# Patient Record
Sex: MALE | Race: WHITE | HISPANIC OR LATINO | Employment: OTHER | URBAN - METROPOLITAN AREA
[De-identification: names, ages, dates, MRNs, and addresses within clinical notes are randomized per-mention and may not be internally consistent; named-entity substitution may affect disease eponyms.]

---

## 2017-01-01 ENCOUNTER — RESOLUTE PROFESSIONAL BILLING HOSPITAL PROF FEE (OUTPATIENT)
Dept: HOSPITALIST | Facility: MEDICAL CENTER | Age: 67
End: 2017-01-01
Payer: COMMERCIAL

## 2017-01-01 ENCOUNTER — PATIENT OUTREACH (OUTPATIENT)
Dept: HEALTH INFORMATION MANAGEMENT | Facility: OTHER | Age: 67
End: 2017-01-01

## 2017-01-01 ENCOUNTER — HOSPITAL ENCOUNTER (INPATIENT)
Facility: MEDICAL CENTER | Age: 67
LOS: 2 days | DRG: 280 | End: 2017-10-30
Attending: EMERGENCY MEDICINE | Admitting: HOSPITALIST
Payer: COMMERCIAL

## 2017-01-01 ENCOUNTER — APPOINTMENT (OUTPATIENT)
Dept: RADIOLOGY | Facility: MEDICAL CENTER | Age: 67
DRG: 280 | End: 2017-01-01
Attending: EMERGENCY MEDICINE
Payer: COMMERCIAL

## 2017-01-01 ENCOUNTER — APPOINTMENT (OUTPATIENT)
Dept: RADIOLOGY | Facility: MEDICAL CENTER | Age: 67
DRG: 280 | End: 2017-01-01
Attending: HOSPITALIST
Payer: COMMERCIAL

## 2017-01-01 VITALS
DIASTOLIC BLOOD PRESSURE: 43 MMHG | WEIGHT: 241.4 LBS | OXYGEN SATURATION: 95 % | HEIGHT: 69 IN | SYSTOLIC BLOOD PRESSURE: 104 MMHG | RESPIRATION RATE: 15 BRPM | TEMPERATURE: 97.7 F | HEART RATE: 75 BPM | BODY MASS INDEX: 35.76 KG/M2

## 2017-01-01 DIAGNOSIS — R79.89 ELEVATED TROPONIN: ICD-10-CM

## 2017-01-01 DIAGNOSIS — N28.9 ACUTE RENAL DISEASE: ICD-10-CM

## 2017-01-01 DIAGNOSIS — D64.9 ANEMIA, UNSPECIFIED TYPE: ICD-10-CM

## 2017-01-01 DIAGNOSIS — I50.9 ACUTE CONGESTIVE HEART FAILURE, UNSPECIFIED CONGESTIVE HEART FAILURE TYPE: ICD-10-CM

## 2017-01-01 DIAGNOSIS — R55 SYNCOPE, UNSPECIFIED SYNCOPE TYPE: ICD-10-CM

## 2017-01-01 LAB
ALBUMIN SERPL BCP-MCNC: 2.3 G/DL (ref 3.2–4.9)
ALBUMIN SERPL BCP-MCNC: 2.4 G/DL (ref 3.2–4.9)
ALBUMIN SERPL BCP-MCNC: 2.5 G/DL (ref 3.2–4.9)
ALBUMIN/GLOB SERPL: 1 G/DL
ALP SERPL-CCNC: 73 U/L (ref 30–99)
ALT SERPL-CCNC: 9 U/L (ref 2–50)
ANION GAP SERPL CALC-SCNC: 7 MMOL/L (ref 0–11.9)
ANISOCYTOSIS BLD QL SMEAR: ABNORMAL
APTT PPP: 33.5 SEC (ref 24.7–36)
AST SERPL-CCNC: 16 U/L (ref 12–45)
BASOPHILS # BLD AUTO: 0 % (ref 0–1.8)
BASOPHILS # BLD: 0 K/UL (ref 0–0.12)
BILIRUB SERPL-MCNC: 0.4 MG/DL (ref 0.1–1.5)
BNP SERPL-MCNC: 518 PG/ML (ref 0–100)
BNP SERPL-MCNC: 653 PG/ML (ref 0–100)
BUN SERPL-MCNC: 39 MG/DL (ref 8–22)
BUN SERPL-MCNC: 42 MG/DL (ref 8–22)
BUN SERPL-MCNC: 44 MG/DL (ref 8–22)
BURR CELLS BLD QL SMEAR: NORMAL
CALCIUM SERPL-MCNC: 7.3 MG/DL (ref 8.5–10.5)
CALCIUM SERPL-MCNC: 7.8 MG/DL (ref 8.5–10.5)
CALCIUM SERPL-MCNC: 7.8 MG/DL (ref 8.5–10.5)
CHLORIDE SERPL-SCNC: 107 MMOL/L (ref 96–112)
CHLORIDE SERPL-SCNC: 108 MMOL/L (ref 96–112)
CHLORIDE SERPL-SCNC: 109 MMOL/L (ref 96–112)
CO2 SERPL-SCNC: 24 MMOL/L (ref 20–33)
CO2 SERPL-SCNC: 25 MMOL/L (ref 20–33)
CO2 SERPL-SCNC: 25 MMOL/L (ref 20–33)
CREAT SERPL-MCNC: 2.26 MG/DL (ref 0.5–1.4)
CREAT SERPL-MCNC: 2.28 MG/DL (ref 0.5–1.4)
CREAT SERPL-MCNC: 2.46 MG/DL (ref 0.5–1.4)
DEPRECATED D DIMER PPP IA-ACNC: <200 NG/ML(D-DU)
EKG IMPRESSION: NORMAL
EOSINOPHIL # BLD AUTO: 0.22 K/UL (ref 0–0.51)
EOSINOPHIL NFR BLD: 3.5 % (ref 0–6.9)
ERYTHROCYTE [DISTWIDTH] IN BLOOD BY AUTOMATED COUNT: 49.7 FL (ref 35.9–50)
ERYTHROCYTE [DISTWIDTH] IN BLOOD BY AUTOMATED COUNT: 49.8 FL (ref 35.9–50)
ERYTHROCYTE [DISTWIDTH] IN BLOOD BY AUTOMATED COUNT: 49.8 FL (ref 35.9–50)
FERRITIN SERPL-MCNC: 25.6 NG/ML (ref 22–322)
GFR SERPL CREATININE-BSD FRML MDRD: 26 ML/MIN/1.73 M 2
GFR SERPL CREATININE-BSD FRML MDRD: 29 ML/MIN/1.73 M 2
GFR SERPL CREATININE-BSD FRML MDRD: 29 ML/MIN/1.73 M 2
GLOBULIN SER CALC-MCNC: 2.4 G/DL (ref 1.9–3.5)
GLUCOSE BLD-MCNC: 126 MG/DL (ref 65–99)
GLUCOSE BLD-MCNC: 192 MG/DL (ref 65–99)
GLUCOSE BLD-MCNC: 224 MG/DL (ref 65–99)
GLUCOSE BLD-MCNC: 252 MG/DL (ref 65–99)
GLUCOSE BLD-MCNC: 258 MG/DL (ref 65–99)
GLUCOSE BLD-MCNC: 75 MG/DL (ref 65–99)
GLUCOSE BLD-MCNC: 79 MG/DL (ref 65–99)
GLUCOSE BLD-MCNC: 83 MG/DL (ref 65–99)
GLUCOSE SERPL-MCNC: 101 MG/DL (ref 65–99)
GLUCOSE SERPL-MCNC: 132 MG/DL (ref 65–99)
GLUCOSE SERPL-MCNC: 192 MG/DL (ref 65–99)
HCT VFR BLD AUTO: 30.2 % (ref 42–52)
HCT VFR BLD AUTO: 30.2 % (ref 42–52)
HCT VFR BLD AUTO: 30.3 % (ref 42–52)
HGB BLD-MCNC: 8.4 G/DL (ref 14–18)
HGB BLD-MCNC: 8.6 G/DL (ref 14–18)
HGB BLD-MCNC: 8.6 G/DL (ref 14–18)
INR PPP: 2.81 (ref 0.87–1.13)
INR PPP: 2.98 (ref 0.87–1.13)
INR PPP: 3.08 (ref 0.87–1.13)
IRON SATN MFR SERPL: 5 % (ref 15–55)
IRON SERPL-MCNC: 15 UG/DL (ref 50–180)
LV EJECT FRACT  99904: 20
LYMPHOCYTES # BLD AUTO: 0.71 K/UL (ref 1–4.8)
LYMPHOCYTES NFR BLD: 11.4 % (ref 22–41)
MANUAL DIFF BLD: NORMAL
MCH RBC QN AUTO: 19.8 PG (ref 27–33)
MCH RBC QN AUTO: 20.3 PG (ref 27–33)
MCH RBC QN AUTO: 20.4 PG (ref 27–33)
MCHC RBC AUTO-ENTMCNC: 27.8 G/DL (ref 33.7–35.3)
MCHC RBC AUTO-ENTMCNC: 28.4 G/DL (ref 33.7–35.3)
MCHC RBC AUTO-ENTMCNC: 28.5 G/DL (ref 33.7–35.3)
MCV RBC AUTO: 71.1 FL (ref 81.4–97.8)
MCV RBC AUTO: 71.2 FL (ref 81.4–97.8)
MCV RBC AUTO: 71.8 FL (ref 81.4–97.8)
MICROCYTES BLD QL SMEAR: ABNORMAL
MONOCYTES # BLD AUTO: 0.43 K/UL (ref 0–0.85)
MONOCYTES NFR BLD AUTO: 7 % (ref 0–13.4)
MORPHOLOGY BLD-IMP: NORMAL
NEUTROPHILS # BLD AUTO: 4.68 K/UL (ref 1.82–7.42)
NEUTROPHILS NFR BLD: 75.5 % (ref 44–72)
NRBC # BLD AUTO: 0 K/UL
NRBC BLD AUTO-RTO: 0 /100 WBC
PHOSPHATE SERPL-MCNC: 3.3 MG/DL (ref 2.5–4.5)
PHOSPHATE SERPL-MCNC: 3.6 MG/DL (ref 2.5–4.5)
PLATELET # BLD AUTO: 192 K/UL (ref 164–446)
PLATELET # BLD AUTO: 196 K/UL (ref 164–446)
PLATELET # BLD AUTO: 218 K/UL (ref 164–446)
PLATELET BLD QL SMEAR: NORMAL
PMV BLD AUTO: 10.1 FL (ref 9–12.9)
PMV BLD AUTO: 10.2 FL (ref 9–12.9)
PMV BLD AUTO: 10.2 FL (ref 9–12.9)
POIKILOCYTOSIS BLD QL SMEAR: NORMAL
POTASSIUM SERPL-SCNC: 4 MMOL/L (ref 3.6–5.5)
POTASSIUM SERPL-SCNC: 4.3 MMOL/L (ref 3.6–5.5)
POTASSIUM SERPL-SCNC: 4.8 MMOL/L (ref 3.6–5.5)
PROMYELOCYTES NFR BLD MANUAL: 2.6 %
PROT SERPL-MCNC: 4.9 G/DL (ref 6–8.2)
PROTHROMBIN TIME: 29.3 SEC (ref 12–14.6)
PROTHROMBIN TIME: 30.7 SEC (ref 12–14.6)
PROTHROMBIN TIME: 31.5 SEC (ref 12–14.6)
RBC # BLD AUTO: 4.22 M/UL (ref 4.7–6.1)
RBC # BLD AUTO: 4.24 M/UL (ref 4.7–6.1)
RBC # BLD AUTO: 4.25 M/UL (ref 4.7–6.1)
RBC BLD AUTO: PRESENT
SODIUM SERPL-SCNC: 138 MMOL/L (ref 135–145)
SODIUM SERPL-SCNC: 139 MMOL/L (ref 135–145)
SODIUM SERPL-SCNC: 141 MMOL/L (ref 135–145)
SPHEROCYTES BLD QL SMEAR: NORMAL
TIBC SERPL-MCNC: 305 UG/DL (ref 250–450)
TROPONIN I SERPL-MCNC: 0.05 NG/ML (ref 0–0.04)
TROPONIN I SERPL-MCNC: 0.07 NG/ML (ref 0–0.04)
TROPONIN I SERPL-MCNC: 0.07 NG/ML (ref 0–0.04)
WBC # BLD AUTO: 5.9 K/UL (ref 4.8–10.8)
WBC # BLD AUTO: 6.2 K/UL (ref 4.8–10.8)
WBC # BLD AUTO: 6.6 K/UL (ref 4.8–10.8)

## 2017-01-01 PROCEDURE — 94660 CPAP INITIATION&MGMT: CPT

## 2017-01-01 PROCEDURE — 83540 ASSAY OF IRON: CPT

## 2017-01-01 PROCEDURE — 85610 PROTHROMBIN TIME: CPT

## 2017-01-01 PROCEDURE — 80069 RENAL FUNCTION PANEL: CPT

## 2017-01-01 PROCEDURE — 700102 HCHG RX REV CODE 250 W/ 637 OVERRIDE(OP): Performed by: HOSPITALIST

## 2017-01-01 PROCEDURE — 700111 HCHG RX REV CODE 636 W/ 250 OVERRIDE (IP): Performed by: HOSPITALIST

## 2017-01-01 PROCEDURE — 84484 ASSAY OF TROPONIN QUANT: CPT

## 2017-01-01 PROCEDURE — 36415 COLL VENOUS BLD VENIPUNCTURE: CPT

## 2017-01-01 PROCEDURE — A9270 NON-COVERED ITEM OR SERVICE: HCPCS | Performed by: INTERNAL MEDICINE

## 2017-01-01 PROCEDURE — 304561 HCHG STAT O2

## 2017-01-01 PROCEDURE — 93005 ELECTROCARDIOGRAM TRACING: CPT | Performed by: EMERGENCY MEDICINE

## 2017-01-01 PROCEDURE — 304562 HCHG STAT O2 MASK/CANNULA

## 2017-01-01 PROCEDURE — 700102 HCHG RX REV CODE 250 W/ 637 OVERRIDE(OP): Performed by: INTERNAL MEDICINE

## 2017-01-01 PROCEDURE — 99239 HOSP IP/OBS DSCHRG MGMT >30: CPT | Performed by: INTERNAL MEDICINE

## 2017-01-01 PROCEDURE — A9270 NON-COVERED ITEM OR SERVICE: HCPCS | Performed by: HOSPITALIST

## 2017-01-01 PROCEDURE — 82962 GLUCOSE BLOOD TEST: CPT | Mod: 91

## 2017-01-01 PROCEDURE — 700102 HCHG RX REV CODE 250 W/ 637 OVERRIDE(OP)

## 2017-01-01 PROCEDURE — 85027 COMPLETE CBC AUTOMATED: CPT

## 2017-01-01 PROCEDURE — 83550 IRON BINDING TEST: CPT

## 2017-01-01 PROCEDURE — 83880 ASSAY OF NATRIURETIC PEPTIDE: CPT

## 2017-01-01 PROCEDURE — 99221 1ST HOSP IP/OBS SF/LOW 40: CPT | Performed by: HOSPITALIST

## 2017-01-01 PROCEDURE — 80053 COMPREHEN METABOLIC PANEL: CPT

## 2017-01-01 PROCEDURE — 99233 SBSQ HOSP IP/OBS HIGH 50: CPT | Performed by: INTERNAL MEDICINE

## 2017-01-01 PROCEDURE — 85007 BL SMEAR W/DIFF WBC COUNT: CPT

## 2017-01-01 PROCEDURE — A9270 NON-COVERED ITEM OR SERVICE: HCPCS

## 2017-01-01 PROCEDURE — 71010 DX-CHEST-PORTABLE (1 VIEW): CPT

## 2017-01-01 PROCEDURE — 93306 TTE W/DOPPLER COMPLETE: CPT | Mod: 26 | Performed by: INTERNAL MEDICINE

## 2017-01-01 PROCEDURE — 93306 TTE W/DOPPLER COMPLETE: CPT

## 2017-01-01 PROCEDURE — 770020 HCHG ROOM/CARE - TELE (206)

## 2017-01-01 PROCEDURE — 85730 THROMBOPLASTIN TIME PARTIAL: CPT

## 2017-01-01 PROCEDURE — 99285 EMERGENCY DEPT VISIT HI MDM: CPT

## 2017-01-01 PROCEDURE — 82728 ASSAY OF FERRITIN: CPT

## 2017-01-01 PROCEDURE — 82962 GLUCOSE BLOOD TEST: CPT

## 2017-01-01 PROCEDURE — 85379 FIBRIN DEGRADATION QUANT: CPT

## 2017-01-01 RX ORDER — FERROUS GLUCONATE 324(38)MG
324 TABLET ORAL
Status: DISCONTINUED | OUTPATIENT
Start: 2017-01-01 | End: 2017-01-01 | Stop reason: HOSPADM

## 2017-01-01 RX ORDER — WARFARIN SODIUM 1 MG/1
1 TABLET ORAL
Status: COMPLETED | OUTPATIENT
Start: 2017-01-01 | End: 2017-01-01

## 2017-01-01 RX ORDER — CARVEDILOL 25 MG/1
25 TABLET ORAL 2 TIMES DAILY WITH MEALS
Status: DISCONTINUED | OUTPATIENT
Start: 2017-01-01 | End: 2017-01-01 | Stop reason: HOSPADM

## 2017-01-01 RX ORDER — WARFARIN SODIUM 2 MG/1
2-3 TABLET ORAL EVERY EVENING
COMMUNITY

## 2017-01-01 RX ORDER — FUROSEMIDE 20 MG/1
40 TABLET ORAL 2 TIMES DAILY
Qty: 60 TAB | Refills: 0 | Status: SHIPPED | OUTPATIENT
Start: 2017-01-01 | End: 2018-01-01

## 2017-01-01 RX ORDER — WARFARIN SODIUM 2 MG/1
2-3 TABLET ORAL EVERY EVENING
Status: DISCONTINUED | OUTPATIENT
Start: 2017-01-01 | End: 2017-01-01

## 2017-01-01 RX ORDER — ASPIRIN 81 MG/1
81 TABLET, CHEWABLE ORAL EVERY MORNING
Status: DISCONTINUED | OUTPATIENT
Start: 2017-01-01 | End: 2017-01-01 | Stop reason: HOSPADM

## 2017-01-01 RX ORDER — FUROSEMIDE 10 MG/ML
20 INJECTION INTRAMUSCULAR; INTRAVENOUS
Status: DISCONTINUED | OUTPATIENT
Start: 2017-01-01 | End: 2017-01-01

## 2017-01-01 RX ORDER — TIZANIDINE 4 MG/1
2 TABLET ORAL EVERY EVENING
Status: DISCONTINUED | OUTPATIENT
Start: 2017-01-01 | End: 2017-01-01 | Stop reason: HOSPADM

## 2017-01-01 RX ORDER — ISOSORBIDE MONONITRATE 30 MG/1
30 TABLET, EXTENDED RELEASE ORAL EVERY MORNING
Status: DISCONTINUED | OUTPATIENT
Start: 2017-01-01 | End: 2017-01-01 | Stop reason: HOSPADM

## 2017-01-01 RX ORDER — ISOSORBIDE MONONITRATE 30 MG/1
30 TABLET, EXTENDED RELEASE ORAL EVERY MORNING
COMMUNITY

## 2017-01-01 RX ORDER — ATORVASTATIN CALCIUM 40 MG/1
40 TABLET, FILM COATED ORAL NIGHTLY
Status: DISCONTINUED | OUTPATIENT
Start: 2017-01-01 | End: 2017-01-01 | Stop reason: HOSPADM

## 2017-01-01 RX ORDER — FERROUS GLUCONATE 324(38)MG
324 TABLET ORAL DAILY
Qty: 30 TAB | Status: ON HOLD | COMMUNITY
Start: 2017-01-01 | End: 2018-01-01

## 2017-01-01 RX ORDER — WARFARIN SODIUM 2 MG/1
2 TABLET ORAL
Status: COMPLETED | OUTPATIENT
Start: 2017-01-01 | End: 2017-01-01

## 2017-01-01 RX ORDER — FUROSEMIDE 40 MG/1
40-80 TABLET ORAL 2 TIMES DAILY
Status: ON HOLD | COMMUNITY
End: 2017-01-01

## 2017-01-01 RX ORDER — ATORVASTATIN CALCIUM 40 MG/1
40 TABLET, FILM COATED ORAL NIGHTLY
COMMUNITY

## 2017-01-01 RX ORDER — CARVEDILOL 25 MG/1
25 TABLET ORAL 2 TIMES DAILY WITH MEALS
COMMUNITY

## 2017-01-01 RX ORDER — POTASSIUM CHLORIDE 20 MEQ/1
20 TABLET, EXTENDED RELEASE ORAL DAILY
Qty: 30 TAB | Refills: 0 | Status: ON HOLD | OUTPATIENT
Start: 2017-01-01 | End: 2018-01-01

## 2017-01-01 RX ORDER — FUROSEMIDE 20 MG/1
20 TABLET ORAL
Status: DISCONTINUED | OUTPATIENT
Start: 2017-01-01 | End: 2017-01-01 | Stop reason: HOSPADM

## 2017-01-01 RX ORDER — HYDRALAZINE HYDROCHLORIDE 25 MG/1
12.5 TABLET, FILM COATED ORAL 2 TIMES DAILY
COMMUNITY

## 2017-01-01 RX ORDER — ACETAMINOPHEN 500 MG
1000 TABLET ORAL EVERY 6 HOURS PRN
COMMUNITY
End: 2018-01-01

## 2017-01-01 RX ORDER — POTASSIUM CHLORIDE 20 MEQ/1
20 TABLET, EXTENDED RELEASE ORAL 2 TIMES DAILY
Status: DISCONTINUED | OUTPATIENT
Start: 2017-01-01 | End: 2017-01-01 | Stop reason: HOSPADM

## 2017-01-01 RX ADMIN — POTASSIUM CHLORIDE 20 MEQ: 1500 TABLET, EXTENDED RELEASE ORAL at 08:54

## 2017-01-01 RX ADMIN — INSULIN LISPRO 3 UNITS: 100 INJECTION, SOLUTION INTRAVENOUS; SUBCUTANEOUS at 12:18

## 2017-01-01 RX ADMIN — INSULIN HUMAN 40 UNITS: 100 INJECTION, SUSPENSION SUBCUTANEOUS at 08:56

## 2017-01-01 RX ADMIN — INSULIN LISPRO 2 UNITS: 100 INJECTION, SOLUTION INTRAVENOUS; SUBCUTANEOUS at 12:27

## 2017-01-01 RX ADMIN — FUROSEMIDE 20 MG: 20 TABLET ORAL at 06:11

## 2017-01-01 RX ADMIN — ATORVASTATIN CALCIUM 40 MG: 40 TABLET, FILM COATED ORAL at 23:02

## 2017-01-01 RX ADMIN — FERROUS GLUCONATE 324 MG: 324 TABLET ORAL at 12:21

## 2017-01-01 RX ADMIN — FERROUS GLUCONATE 324 MG: 324 TABLET ORAL at 17:16

## 2017-01-01 RX ADMIN — POTASSIUM CHLORIDE 20 MEQ: 1500 TABLET, EXTENDED RELEASE ORAL at 23:02

## 2017-01-01 RX ADMIN — FERROUS GLUCONATE 324 MG: 324 TABLET ORAL at 08:53

## 2017-01-01 RX ADMIN — ASPIRIN 81 MG: 81 TABLET, CHEWABLE ORAL at 09:20

## 2017-01-01 RX ADMIN — CARVEDILOL 25 MG: 25 TABLET, FILM COATED ORAL at 09:20

## 2017-01-01 RX ADMIN — ASPIRIN 81 MG: 81 TABLET, CHEWABLE ORAL at 08:53

## 2017-01-01 RX ADMIN — FUROSEMIDE 20 MG: 10 INJECTION, SOLUTION INTRAMUSCULAR; INTRAVENOUS at 06:41

## 2017-01-01 RX ADMIN — FUROSEMIDE 20 MG: 20 TABLET ORAL at 17:16

## 2017-01-01 RX ADMIN — WARFARIN SODIUM 2 MG: 2 TABLET ORAL at 23:30

## 2017-01-01 RX ADMIN — CARVEDILOL 25 MG: 25 TABLET, FILM COATED ORAL at 23:02

## 2017-01-01 RX ADMIN — FERROUS GLUCONATE 324 MG: 324 TABLET ORAL at 23:02

## 2017-01-01 RX ADMIN — ISOSORBIDE MONONITRATE 30 MG: 30 TABLET, EXTENDED RELEASE ORAL at 08:53

## 2017-01-01 RX ADMIN — FERROUS GLUCONATE 324 MG: 324 TABLET ORAL at 09:20

## 2017-01-01 RX ADMIN — INSULIN HUMAN 40 UNITS: 100 INJECTION, SUSPENSION SUBCUTANEOUS at 12:19

## 2017-01-01 RX ADMIN — FUROSEMIDE 20 MG: 10 INJECTION, SOLUTION INTRAMUSCULAR; INTRAVENOUS at 23:02

## 2017-01-01 RX ADMIN — INSULIN LISPRO 5 UNITS: 100 INJECTION, SOLUTION INTRAVENOUS; SUBCUTANEOUS at 21:07

## 2017-01-01 RX ADMIN — INSULIN HUMAN 34 UNITS: 100 INJECTION, SUSPENSION SUBCUTANEOUS at 21:06

## 2017-01-01 RX ADMIN — POTASSIUM CHLORIDE 20 MEQ: 1500 TABLET, EXTENDED RELEASE ORAL at 20:58

## 2017-01-01 RX ADMIN — TIZANIDINE 2 MG: 4 TABLET ORAL at 23:30

## 2017-01-01 RX ADMIN — CARVEDILOL 25 MG: 25 TABLET, FILM COATED ORAL at 08:53

## 2017-01-01 RX ADMIN — INSULIN LISPRO 5 UNITS: 100 INJECTION, SOLUTION INTRAVENOUS; SUBCUTANEOUS at 17:13

## 2017-01-01 RX ADMIN — POTASSIUM CHLORIDE 20 MEQ: 1500 TABLET, EXTENDED RELEASE ORAL at 09:20

## 2017-01-01 RX ADMIN — ATORVASTATIN CALCIUM 40 MG: 40 TABLET, FILM COATED ORAL at 20:58

## 2017-01-01 RX ADMIN — ISOSORBIDE MONONITRATE 30 MG: 30 TABLET, EXTENDED RELEASE ORAL at 17:16

## 2017-01-01 RX ADMIN — CARVEDILOL 25 MG: 25 TABLET, FILM COATED ORAL at 17:16

## 2017-01-01 RX ADMIN — WARFARIN SODIUM 1 MG: 1 TABLET ORAL at 18:15

## 2017-01-01 RX ADMIN — TIZANIDINE 2 MG: 4 TABLET ORAL at 22:21

## 2017-01-01 RX ADMIN — FERROUS GLUCONATE 324 MG: 324 TABLET ORAL at 12:29

## 2017-01-01 ASSESSMENT — PAIN SCALES - GENERAL
PAINLEVEL_OUTOF10: 0

## 2017-01-01 ASSESSMENT — COGNITIVE AND FUNCTIONAL STATUS - GENERAL
SUGGESTED CMS G CODE MODIFIER DAILY ACTIVITY: CH
MOBILITY SCORE: 24
DAILY ACTIVITIY SCORE: 24
SUGGESTED CMS G CODE MODIFIER MOBILITY: CH

## 2017-01-01 ASSESSMENT — ENCOUNTER SYMPTOMS
HEADACHES: 0
BLOOD IN STOOL: 0
DIZZINESS: 0
EYE REDNESS: 0
FOCAL WEAKNESS: 0
INSOMNIA: 0
DIARRHEA: 0
EYE PAIN: 0
NAUSEA: 0
MYALGIAS: 0
CHILLS: 0
TREMORS: 0
SHORTNESS OF BREATH: 1
FEVER: 0
ABDOMINAL PAIN: 0
SEIZURES: 0
WEAKNESS: 0
HEMOPTYSIS: 0
ORTHOPNEA: 1
VOMITING: 0
CONSTIPATION: 0
NERVOUS/ANXIOUS: 0
COUGH: 0
LOSS OF CONSCIOUSNESS: 0
WHEEZING: 0
FALLS: 0
PALPITATIONS: 0

## 2017-01-01 ASSESSMENT — PATIENT HEALTH QUESTIONNAIRE - PHQ9
SUM OF ALL RESPONSES TO PHQ9 QUESTIONS 1 AND 2: 0
2. FEELING DOWN, DEPRESSED, IRRITABLE, OR HOPELESS: NOT AT ALL
SUM OF ALL RESPONSES TO PHQ QUESTIONS 1-9: 0
1. LITTLE INTEREST OR PLEASURE IN DOING THINGS: NOT AT ALL

## 2017-01-01 ASSESSMENT — LIFESTYLE VARIABLES
DO YOU DRINK ALCOHOL: NO
DO YOU DRINK ALCOHOL: NO
EVER_SMOKED: YES

## 2017-10-28 PROBLEM — I50.43 ACUTE ON CHRONIC COMBINED SYSTOLIC AND DIASTOLIC CHF (CONGESTIVE HEART FAILURE) (HCC): Status: ACTIVE | Noted: 2017-01-01

## 2017-10-28 PROBLEM — R55 SYNCOPE: Status: ACTIVE | Noted: 2017-01-01

## 2017-10-28 PROBLEM — N18.30 CKD (CHRONIC KIDNEY DISEASE) STAGE 3, GFR 30-59 ML/MIN: Status: ACTIVE | Noted: 2017-01-01

## 2017-10-28 NOTE — ED PROVIDER NOTES
ED Provider Note    Scribed for Natasha Underwood M.D. by Annemarie Felder. 10/28/2017, 4:55 PM.    Means of arrival: Ambulance  History obtained from: Patient, Son  History limited by: None    CHIEF COMPLAINT  Chief Complaint   Patient presents with   • Other       HPI  Rell Pérez is a 67 y.o. male who presents to the Emergency Department brought in by ambulance after becoming unresponsive. The patient was driving with son and stated he was not feeling well, felt very hot. He started gagging like he was going to vomit. Son states he pulled up to stop and noticed his eyes rolled back and thick liquid was coming from his mouth, and he noticed he was not responding and stopped breathing. Son states he tried to clear airway and started CPR and give rescue breaths, and when fire arrived, they did additional CPR. After about 1 minute, patient came to. Patient has history of IDDM and FSBS 187. States he did not bite his tongue and denies incontinence. Pt states he took Robitussin for the first time and this also made him feel weird. He also has history of CHF, hypertension, CABG in 1997, a fib with defib-pacer. No history of PE or DVT. The patient is visiting from [place] and just drove up today. He was 88% on room air and not normally on home oxygen. He noticed that his legs are slightly more swollen than normal. Currently, the patient has a cough but denies any chest pain, hemoptysis. He does use nebulizer and CPAP for sleep apnea    REVIEW OF SYSTEMS  Pertinent positives include unresponsive episode, leg swelling. Pertinent negatives include no chest pain. All other systems reviewed and negative.     PAST MEDICAL HISTORY   CHF  Hypertension, A fib    SURGICAL HISTORY  CABG    SOCIAL HISTORY  Social History   Substance Use Topics   • Smoking status: Former Smoker     Types: Cigarettes     Quit date: 10/28/1997   • Smokeless tobacco: Never Used   • Alcohol use No      History   Drug use: Unknown       FAMILY  "HISTORY  History reviewed. No pertinent family history.    CURRENT MEDICATIONS  Home Medications     Reviewed by Brit Tobin R.N. (Registered Nurse) on 10/28/17 at 1654  Med List Status: Partial   Medication Last Dose Status   Atorvastatin Calcium (LIPITOR PO)  Active   CARVEDILOL PO  Active   FUROSEMIDE PO  Active   Isosorbide Mononitrate (IMDUR PO)  Active   NON SPECIFIED  Active                ALLERGIES  No Known Allergies    PHYSICAL EXAM  VITAL SIGNS: Pulse 74   Temp 36.8 °C (98.2 °F)   Resp 16   Ht 1.753 m (5' 9\")   Wt 99.8 kg (220 lb)   SpO2 100%   BMI 32.49 kg/m²     Constitutional:Well developed, No acute distress, Non-toxic appearance. Joking in the room that he wants daysi ROBERTS: Normocephalic, Atraumatic, Bilateral external ears normal, Oropharynx moist, No oral exudates, Nose normal. No tongue abrasions or lacerations  Eyes: PERRL, EOMI, Conjunctiva normal, No discharge.   Neck: Normal range of motion, No tenderness, Supple, No stridor.   Lymphatic: No lymphadenopathy noted.   Cardiovascular: Normal heart rate, Normal rhythm. Sternal scar  Thorax & Lungs: Normal breath sounds, No respiratory distress, No wheezing, No chest tenderness.   Abdomen: Benign abdominal exam, no guarding no rebound, no masses, no pulsatile mass, no tenderness, no distention  Skin: Warm, Dry, No erythema, No rash.   Back: No tenderness, No CVA tenderness.   Extremities: Intact distal pulses, 2+ pedal edema right greater than left, No tenderness   Neurologic: Alert & oriented x 3, Normal motor function, Normal sensory function, No focal deficits noted.   Psychiatric: Appropriate                                                     DIAGNOSTIC STUDIES / PROCEDURES\    LABS  Results for orders placed or performed during the hospital encounter of 10/28/17   CBC WITH DIFFERENTIAL   Result Value Ref Range    WBC 6.2 4.8 - 10.8 K/uL    RBC 4.22 (L) 4.70 - 6.10 M/uL    Hemoglobin 8.6 (L) 14.0 - 18.0 g/dL    Hematocrit 30.3 (L) " 42.0 - 52.0 %    MCV 71.8 (L) 81.4 - 97.8 fL    MCH 20.4 (L) 27.0 - 33.0 pg    MCHC 28.4 (L) 33.7 - 35.3 g/dL    RDW 49.8 35.9 - 50.0 fL    Platelet Count 196 164 - 446 K/uL    MPV 10.1 9.0 - 12.9 fL    Neutrophils-Polys 75.50 (H) 44.00 - 72.00 %    Lymphocytes 11.40 (L) 22.00 - 41.00 %    Monocytes 7.00 0.00 - 13.40 %    Eosinophils 3.50 0.00 - 6.90 %    Basophils 0.00 0.00 - 1.80 %    Nucleated RBC 0.00 /100 WBC    Neutrophils (Absolute) 4.68 1.82 - 7.42 K/uL    Lymphs (Absolute) 0.71 (L) 1.00 - 4.80 K/uL    Monos (Absolute) 0.43 0.00 - 0.85 K/uL    Eos (Absolute) 0.22 0.00 - 0.51 K/uL    Baso (Absolute) 0.00 0.00 - 0.12 K/uL    NRBC (Absolute) 0.00 K/uL    Anisocytosis 2+     Microcytosis 2+    COMP METABOLIC PANEL   Result Value Ref Range    Sodium 138 135 - 145 mmol/L    Potassium 4.0 3.6 - 5.5 mmol/L    Chloride 107 96 - 112 mmol/L    Co2 24 20 - 33 mmol/L    Anion Gap 7.0 0.0 - 11.9    Glucose 132 (H) 65 - 99 mg/dL    Bun 44 (H) 8 - 22 mg/dL    Creatinine 2.46 (H) 0.50 - 1.40 mg/dL    Calcium 7.3 (L) 8.5 - 10.5 mg/dL    AST(SGOT) 16 12 - 45 U/L    ALT(SGPT) 9 2 - 50 U/L    Alkaline Phosphatase 73 30 - 99 U/L    Total Bilirubin 0.4 0.1 - 1.5 mg/dL    Albumin 2.5 (L) 3.2 - 4.9 g/dL    Total Protein 4.9 (L) 6.0 - 8.2 g/dL    Globulin 2.4 1.9 - 3.5 g/dL    A-G Ratio 1.0 g/dL   TROPONIN   Result Value Ref Range    Troponin I 0.05 (H) 0.00 - 0.04 ng/mL   PRTOTHROMBIN TIME (INR)   Result Value Ref Range    PT 30.7 (H) 12.0 - 14.6 sec    INR 2.98 (H) 0.87 - 1.13   APTT   Result Value Ref Range    APTT 33.5 24.7 - 36.0 sec   BTYPE NATRIURETIC PEPTIDE   Result Value Ref Range    B Natriuretic Peptide 518 (H) 0 - 100 pg/mL   D-DIMER   Result Value Ref Range    D-Dimer Screen <200 <250 ng/mL(D-DU)   ESTIMATED GFR   Result Value Ref Range    GFR If  32 (A) >60 mL/min/1.73 m 2    GFR If Non African American 26 (A) >60 mL/min/1.73 m 2   DIFFERENTIAL MANUAL   Result Value Ref Range    Progranulocytes 2.60 %     Manual Diff Status PERFORMED    PERIPHERAL SMEAR REVIEW   Result Value Ref Range    Peripheral Smear Review see below    PLATELET ESTIMATE   Result Value Ref Range    Plt Estimation Normal    MORPHOLOGY   Result Value Ref Range    RBC Morphology Present     Poikilocytosis 1+     Echinocytes 1+     Spherocytes 1+    EKG (ER)   Result Value Ref Range    Report       [facility] Emergency Dept.    Test Date:  2017-10-28  Pt Name:    PAM DE LEON                Department: ER  MRN:        [id number]                      Room:       [facility]  Gender:     M                            Technician: [id number]  :        [id number]                   Requested By:VALORIE HAHN  Order #:    [id number]                    Reading MD:    Measurements  Intervals                                Axis  Rate:       74                           P:          -13  TX:         196                          QRS:        48  QRSD:       112                          T:          220  QT:         452  QTc:        502    Interpretive Statements  SINUS RHYTHM  PROBABLE LEFT ATRIAL ABNORMALITY  NONSPECIFIC INTRAVENTRICULAR CONDUCTION DELAY  ANTERIOR INFARCT, AGE INDETERMINATE  No previous ECG available for comparison         All labs reviewed by me.    EKG  12 lead EKG interpreted by me.   Rhythm:  Normal sinus rhythm   Rate: 74  Axis: Normal  Ectopy: None  Conduction: Normal  ST Segments: Minimal ST elevation in lead II  T Waves: Flipped in V5-V6  Q Waves: None  Clinical Impression: No ST elevation myocardial infarction.   Comparison: No old available for comparison    RADIOLOGY  DX-CHEST-PORTABLE (1 VIEW)   Final Result      1.  Enlarged cardiac silhouette with central vascular congestion/edema.        The radiologist's interpretation of all radiological studies have been reviewed by me.    COURSE & MEDICAL DECISION MAKING  Nursing notes, VS, PMSFHx reviewed in chart.     4:55 PM Patient seen and examined at bedside. The patient  presents with after a syncopal episode. Son reports he stopped breathing so he performed CPR. When paramedics arrived no arrhythmias noted. Pt now alert and appropriate. No history that sounds like seizure. No current chest pain. he is mildly hypoxia at 88%. Pt just drove up here from [place] today and has been complaining of cough. Will rule out infection vs dysrhythmia, vs PE    Labs show mild anemia. He has a normal white count without bandemia. He does have evidence of some progranulocytes. Patient's renal function is elevated. There is no evidence of a gap acidosis. Calcium is slightly low. Troponin was minimally elevated and BNP is also moderately elevated. D-dimer was normal. Chest x-ray did not show evidence of a pneumonia but there was an enlarged cardiac silhouette with some vascular congestion which is consistent with his elevated BNP. Patient be admitted to the hospital to evaluate for possible arrhythmia. At this point it isn't clear what happened to the patient. However it is concerning that family did perform CPR on the patient and insists that he had stopped breathing. He is currently resting comfortably watching baseball in no distress.    6:36 PM Spoke with Dr. Tripp, hospitalist, concerning patient case. Agreed to admit patient for further treatment and evaluation.     Disposition  Patient will be admitted to Dr. Tripp, Hospitalist in guarded condition.      FINAL IMPRESSION  1. Syncope, unspecified syncope type    2. Acute congestive heart failure, unspecified congestive heart failure type (CMS-HCC)    3. Anemia, unspecified type    4. Acute renal disease    5. Elevated troponin          Annemarie CRUMP), am scribing for, and in the presence of, Natasha Underwood M.D..    Electronically signed by: Annemarie Felder (Terrence), 10/28/2017    INatasha M.D. personally performed the services described in this documentation, as scribed by Annemarie Felder in my presence, and it  is both accurate and complete.    The note accurately reflects work and decisions made by me.  Natasha Underwood  10/28/2017  7:18 PM

## 2017-10-28 NOTE — ED NOTES
SYED MCKEON from scene as described by family , patient was in car, vomited, eyes rolled back, son did mouth to mouth and chest compressions,for about 1 minute, fire did addl compressions, fsbs 187 , visiting from [facility]: HTN CHF PACEMAKER cabg 1997

## 2017-10-29 PROBLEM — D63.1 ANEMIA DUE TO CHRONIC KIDNEY DISEASE: Status: ACTIVE | Noted: 2017-01-01

## 2017-10-29 PROBLEM — E11.9 TYPE 2 DIABETES MELLITUS (HCC): Status: ACTIVE | Noted: 2017-01-01

## 2017-10-29 PROBLEM — N18.9 ANEMIA DUE TO CHRONIC KIDNEY DISEASE: Status: ACTIVE | Noted: 2017-01-01

## 2017-10-29 PROBLEM — E78.5 HYPERLIPIDEMIA: Status: ACTIVE | Noted: 2017-01-01

## 2017-10-29 PROBLEM — G47.33 OBSTRUCTIVE SLEEP APNEA: Status: ACTIVE | Noted: 2017-01-01

## 2017-10-29 PROBLEM — I48.91 ATRIAL FIBRILLATION (HCC): Status: ACTIVE | Noted: 2017-01-01

## 2017-10-29 PROBLEM — I50.41 ACUTE COMBINED SYSTOLIC AND DIASTOLIC CONGESTIVE HEART FAILURE, NYHA CLASS 3 (HCC): Status: ACTIVE | Noted: 2017-01-01

## 2017-10-29 PROBLEM — I50.42 CHRONIC COMBINED SYSTOLIC AND DIASTOLIC CHF (CONGESTIVE HEART FAILURE) (HCC): Status: ACTIVE | Noted: 2017-01-01

## 2017-10-29 PROBLEM — D68.318 CIRCULATING ANTICOAGULANTS (HCC): Status: ACTIVE | Noted: 2017-01-01

## 2017-10-29 PROBLEM — D64.9 ANEMIA: Status: ACTIVE | Noted: 2017-01-01

## 2017-10-29 NOTE — ED NOTES
Pt requesting something to eat. Dr Underwood aware and verified pt can have meal at this time. FSBS resulted at 79 prior to meal. Pt is AOx4.

## 2017-10-29 NOTE — PROGRESS NOTES
Monitor Summary    Afib  with occasional PVCs in rare triplets and couplets; asymptomatic 5 beats of Vtach  -/.10/-

## 2017-10-29 NOTE — ASSESSMENT & PLAN NOTE
Presented with unresponsiveness, eyes rolled back. Has a significant history of arrhythmia and has a pacer.  Cardiology consulted. Dr. Darrion Ware did not think this was cardiac in origin. He mentioned to continue all his cardiac medications. Discussed with [facility] Cardiology NP.  May have been related with orthostasis, overdiuresis. Is on high dose Lasix at home.  No syncope here. His blood pressures are stable on reduced Lasix. Likely orthostasis from fluid losses.  Follow up to Dr. De(sp?) his PCP at [facility] in 1 week  Follow up with Dr. Duarte Cardiologist and his CHF team there at [facility] in 1 week

## 2017-10-29 NOTE — ASSESSMENT & PLAN NOTE
"Presented with syncope, was \"CPRed\" on the field  Followed by his cardiologist, Dr. Duarte and primary care, Dr. Yen, at [facility].  On high dose Lasix at home. May have been overdiuresed however he still has lower extremity swelling.  Elevated BNP  At discharge date he improved and wants to go home. He is afebrile and hemodynamically stable. He walked with nursing and did not require oxygen and was not having lightheadedness, chest pain, shortness of breath (has chronic MANCINI). I offerred home health care which he had before but he mentioned that he will have that arranged with his primary care doctor at [facility]. His wife was at bedside confirming this. I advised him to check weights daily and follow a dry weight, fluid restriction, and follow closely with heart failure team at [facility] for Lasiox tritration. His internal medicine doctor torito scruggs is also a kidney specialist, hence will need follow up.   Follow up to Dr. De(sp?) his PCP at [facility] in 1 week  Follow up with Dr. Duarte Cardiologist and his CHF team there at [facility] in 1 week  "

## 2017-10-29 NOTE — ASSESSMENT & PLAN NOTE
He mentions this is new to him  No melena, hematochezia, hematemesis or bleeding reported  Has CKD  Iron shows mixed iron deficiency (ferritin and sats low, TIBC normal)  Start PO iron supplementation  Defer to his primary care physician for GI work-up  Follow up to Dr. De(sp?) his PCP at [facility] in 1 week. Recommend referral to GI

## 2017-10-29 NOTE — ED NOTES
Family at bedside, explained plan of care, patient has no complaints other than wanting to eat, patient has eaten after his insulin dose today.

## 2017-10-29 NOTE — ASSESSMENT & PLAN NOTE
I ordered the sliding scale myself. Continue basal insulin.  Follow up to Dr. De(sp?) his PCP at [facility] in 1 week  Follow up with Dr. Duarte Cardiologist and his CHF team there at [facility] in 1 week

## 2017-10-29 NOTE — ASSESSMENT & PLAN NOTE
On Coumadin for bypass, severe CAD according to him.  I ordered the warfarin myself; will get INR first and Pharmacy may have to dose depending on result.

## 2017-10-29 NOTE — PROGRESS NOTES
Inpatient Anticoagulation Service Note    Date: 10/28/2017  Reason for Anticoagulation: Atrial Fibrillation  Hemoglobin Value: (!) 8.6  Hematocrit Value: (!) 30.3  Lab Platelet Value: 196  Target INR: 2.0 to 3.0  INR from last 7 days     Date/Time INR Value    10/28/17 1714 (!)  2.98        Dose from last 7 days     Date/Time Dose (mg)    10/28/17 2100  2        Average Dose (mg):  (2mg Wed,Fri,Sat - 3mg AOD)  Significant Interactions: Aspirin, Statin  Bridge Therapy: No     Comments:   Warfarin indication is afib.  Patient is from out of town. Home dose confirmed by [employer] tech.  INR on high end of goal. INR could uptrend from CHF exacerbation  No signs/symptoms of bleeding. Monitor.    Plan:  Home dose tonight. Trend INR. Pharmacy will follow.  Education Material Provided?: No (Chronic warfarin)  Pharmacist suggested discharge dosing: Likely the home dose.     Ta Ward, PharmD, BCPS

## 2017-10-29 NOTE — H&P
DATE OF SERVICE:  10/28/2017.    REASON FOR EVALUATION:  Unresponsiveness/syncope.    HISTORY OF PRESENT ILLNESS:  This is a 67-year-old male with history of   coronary artery disease, CABG, AFib, who was traveling from [place] to [place]   in a car.  He apparently had an episode of unresponsiveness, where his eyes   rolled back, and it is unclear whether or not he had a pulse or not, but   apparently, CPR was initiated by the son.  When fire department arrived, they   also did compressions.  Patient responded and then was brought to the   emergency room at [facility] for further evaluation.  In the emergency at [facility],   he was alert, awake, and appropriate.  No chest pain.  He has a creatinine   that was noted at 2.46.  Patient states that his creatinine is a known chronic   issue.  He had evidence of leg edema and elevated BNP of 518.  Patient does   have a pacer/defibrillator, and we have called to be interrogated.  The   patient also had evidence of microcytic anemia, and I ordered some iron   studies, and he was deficient in iron.  It is recommended he get an outpatient   colonoscopy.  Patient to be admitted for further evaluation of his syncopal   episode.    ALLERGIES:  None.    OUTPATIENT MEDICATIONS:  Lipitor 40 mg daily; Coreg 25 mg b.i.d.; Lasix 40 mg   in the morning and 80 mg in the afternoon; hydralazine 12.5 mg b.i.d.;   Coumadin 2 mg on Wednesday, Friday, Saturday and 3 mg on Sunday, Monday,   Tuesday, Thursday; 81 mg of aspirin; NPH 40 units in the morning, 34 units in   the evening; regular sliding scale.    PAST MEDICAL HISTORY:  The patient's past medical history includes congestive   heart failure, coronary artery disease, diabetes mellitus type 2, atrial   fibrillation.    PAST SURGICAL HISTORY:  CABG 3-vessel, rotator cuff surgery in left shoulder,   pacer/defibrillator placed.    SOCIAL HISTORY:  Ex-smoker, he quit in 1997.  Does not drink any alcohol.  No   illicit drugs.  Patient lives in [facility]  [place].    FAMILY HISTORY:  No family history of coronary artery disease in the family.    REVIEW OF SYSTEMS:  Patient denies any chest pain or shortness of breath.  No   fever, no chills, no headache, no blurry vision.  No abdominal pain or   constipation.  He has bilateral leg swelling.  No focal deficits.  No swollen   nodes.  Review of systems is otherwise negative.    PHYSICAL EXAMINATION:  VITAL SIGNS:  Blood pressure 113/43, respirations 16, pulse 74, temperature   36.8.  GENERAL:  He is a  male, not in acute distress.  HEENT:  Sclerae anicteric.  Extraocular movements intact.  Oral mucosa is   moist.  NECK:  Supple.  HEART:  S1, S2.  Regular rate.  No murmur appreciated.  CHEST:  He has a previous CABG scar.  He has a scar in his left shoulder from   pacer placement.  LUNGS:  Clear.  No rales or wheezing.  ABDOMEN:  Distended, soft, nontender.  Positive bowel sounds appreciated.  EXTREMITIES:  He has +1 pitting edema in both lower extremities.  No cyanosis,   no clubbing.  NEUROLOGIC:  He is alert, awake, oriented, no focal deficits.    LABORATORY DATA:  White cell count of 6, hemoglobin of 8.6, hematocrit 30,   platelet count is 196.  Sodium 138, potassium 4, glucose 132, BUN of 44,   creatinine of 2.46.  AST 16, ALT of 9.  INR of 15.  TIBC of 305, percent   saturation of 5.  Troponin I 0.05.  BNP of 518.    X-ray of chest shows enlarged cardiac silhouette with some congestion.  EKG   reviewed by me; normal sinus rhythm, left atrial abnormality, loss of R waves   in anterior leads.    IMPRESSION:  1.  Syncope.  2.  Chronic kidney disease stage III.  3.  Diabetes mellitus type 2.  4.  Coronary artery disease.  5.  Rule out acute coronary syndrome.  6.  History of obstructive sleep apnea, on CPAP.  7.  Iron-deficiency anemia.    PLAN:  Patient will be admitted.  We anticipate a 2-midnight stay for this   patient.  Patient's syncopal episode sounds respiratory in nature.  We will   still interrogate the  pacer to make sure there is no significant arrhythmia.    Continue patient's aspirin and Coumadin.  Continue patient's statin.  Patient   will be diuresed with Lasix 20 mg IV q.12 hours.  Repeat labs in a.m.; CBC,   chemistry, and a BNP.  Check an echocardiogram.  Check troponin x3.  If   troponin is negative, patient will go for Persantine thallium stress test in   the a.m.  Patient to be on diabetic diet with fingersticks with sliding scale   insulin.  Continue patient's CPAP at night.       ____________________________________     MD KIRA CORTEZ / RITESH    DD:  10/28/2017 21:57:28  DT:  10/28/2017 22:59:59    D#:  [id number]  Job#:  [id number]

## 2017-10-29 NOTE — PROGRESS NOTES
Pt arrived to floor via stretcher. Pt ambulated from hallway to stand up scale and hospital bed while demonstrating proper safety precautions with standby/handheld assist. Telephone report and 12-hour chart check completed per policy and procedure. Assuming care of pt at this time. Family at bedside.

## 2017-10-29 NOTE — CONSULTS
EP Consult Note    DOS: 10/28/2017    Consulting physician: Kranthi Sutherland MD    Chief complaint/Reason for consult: Syncope    HPI:  Patient is a 68 yo M with history of CAD s/p 3V CABG in the 1990s, CHF, HTN, s/p SJM single chamber ICD. He was in his usual state of health and visiting his son here. He is from the [facility] and has his device followed by [facility]. He said he walked outside of his son's house to get in the car and he began to feel warm and clammy despite the temperature outside being fairly cool. He began to feel nauseated and felt like he was going to vomit. Soon afterwards he lost consciousness. Per family he may have been unconscious for as long as 3 minutes. They felt he was not breathing. They did not a pulse but to be safe, began CPR. EMS was called but he was already awake by the time they arrived. He says he has felt exhausted since the incident.. EP consulted for syncope and device interrogation.    ROS (+ highlighted in red):  Constitutional: Fevers/chills/fatigue/weightloss  HEENT: Blurry vision/eye pain/sore throat/hearing loss  Respiratory: Shortness of breath/cough  Cardiovascular: Chest pain/palpitations/edema/orthopnea/syncope  GI: Nausea/vomitting/diarrhea  MSK: Arthralgias/myagias/muscle weakness  Skin: Rash/sores  Neurological: Numbness/tremors/vertigo  Endocrine: Excessive thirst/polyuria/cold intolerance/heat intolerance  Psych: Depression/anxiety    PMHx:  CAD s/p CABG  HTN  Systolic CHF  S/p ICD    History reviewed. No pertinent surgical history.    Social History     Social History   • Marital status:      Spouse name: N/A   • Number of children: N/A   • Years of education: N/A     Occupational History   • Not on file.     Social History Main Topics   • Smoking status: Former Smoker     Types: Cigarettes     Quit date: 10/28/1997   • Smokeless tobacco: Never Used   • Alcohol use No   • Drug use: Unknown   • Sexual activity: Not on file     Other Topics  Concern   • Not on file     Social History Narrative   • No narrative on file       History reviewed. No pertinent family history.    No Known Allergies    Current Facility-Administered Medications   Medication Dose Route Frequency Provider Last Rate Last Dose   • [START ON 10/29/2017] aspirin (ASA) chewable tab 81 mg  81 mg Oral QAM Bashir Mejia M.D.       • atorvastatin (LIPITOR) tablet 40 mg  40 mg Oral Nightly Bashir Mejia M.D.       • carvedilol (COREG) tablet 25 mg  25 mg Oral BID WITH MEALS Bashir Mejia M.D.       • insulin NPH (HUMULIN,NOVOLIN) injection 34 Units  34 Units Subcutaneous Q EVENING Bashir Mejia M.D.       • furosemide (LASIX) injection 20 mg  20 mg Intravenous BID DIURETIC Bashir Mejia M.D.       • potassium chloride SA (Kdur) tablet 20 mEq  20 mEq Oral BID Bashir Mejia M.D.       • [START ON 10/29/2017] insulin lispro (HUMALOG) injection 2-9 Units  2-9 Units Subcutaneous 4X/DAY ACHS Bashir Mejia M.D.       • tizanidine (ZANAFLEX) tablet 2 mg  2 mg Oral Q EVENING Bashir Mejia M.D.       • [START ON 10/29/2017] insulin NPH (HUMULIN,NOVOLIN) injection 40 Units  40 Units Subcutaneous QAM INSULIN Bashir Mejia M.D.       • warfarin (COUMADIN) tablet 2 mg  2 mg Oral COUMADIN-ONCE Ta Ward, PharmD         Current Outpatient Prescriptions   Medication Sig Dispense Refill   • atorvastatin (LIPITOR) 40 MG Tab Take 40 mg by mouth every evening.     • carvedilol (COREG) 25 MG Tab Take 25 mg by mouth 2 times a day, with meals.     • furosemide (LASIX) 40 MG Tab Take 40-80 mg by mouth 2 Times a Day. 40mg in AM, 80mg in afternoon     • hydrALAZINE (APRESOLINE) 25 MG Tab Take 12.5 mg by mouth 2 Times a Day.     • warfarin (COUMADIN) 2 MG Tab Take 2-3 mg by mouth every evening. 2mg on Wednesday/Friday/Saturday  3mg on Sunday/Monday/Tuesday/Thursday     • aspirin 81 MG tablet Take 81 mg by mouth every morning.     • acetaminophen (TYLENOL) 500 MG Tab Take 1,000 mg by  mouth every 6 hours as needed for Moderate Pain.     • insulin NPH (HUMULIN,NOVOLIN) 100 UNIT/ML Suspension Inject 34-40 Units as instructed 2 times a day, with meals. Pt mixes with Humulin R BID with meals  40 units in the morning, 34 units in the evening     • insulin regular (HUMULIN R) 100 Unit/mL Solution Inject 8-20 Units as instructed 3 times a day before meals. Pt mixes with Humulin N for breakfast and dinner, then uses alone at lunchtime  16-22 units at breakfast  8-16 units at lunch  16-20 units at dinner  Depending on blood sugar/what pt eats     • sodium chloride 7% Solution 3 mL by Nebulization route as needed (shortness of breath).         Physical Exam:  Vitals:    10/28/17 1801 10/28/17 1900 10/28/17 2001 10/28/17 2100   Pulse: 73 77 73 74   Resp:  16 16 16   Temp:       SpO2: 98% 97% 95% 99%   Weight:       Height:         General appearance: NAD, conversant   Eyes: anicteric sclerae, moist conjunctivae; no lid-lag; PERRLA  HENT: Atraumatic; oropharynx clear with moist mucous membranes and no mucosal ulcerations; normal hard and soft palate  Neck: Trachea midline; FROM, supple, no thyromegaly or lymphadenopathy  Lungs: CTA, with normal respiratory effort and no intercostal retractions  CV: RRR, no MRGs, no JVD   Abdomen: Soft, non-tender; no masses or HSM  Extremities: No peripheral edema or extremity lymphadenopathy  Skin: Normal temperature, turgor and texture; no rash, ulcers or subcutaneous nodules  Psych: Appropriate affect, alert and oriented to person, place and time    Data:  Labs reviewed    CXR interpreted by me:  Lungs clear, cardiomegaly, single chamber ICD    EKG interpreted by me:   Sinus, IVCD    Impression/Plan:  1)Syncope  2)CHF  3)Ischemic cardiomyopathy  4)S/p ICD  5)Chronic anticoagulation  6)CKD    -I have personally interrogated the device  -VT therapy zone begins at 170 and there is no recorded episodes  -His sensing is stable at >12 and capture threshold on his V lead is at  0.75 today (with set output at 2 with >2x safety margin)  -I do not think his episode was due to arrhythmia  -No evidence of valvular or other structural issues  -From the history that he gives, it sounds like he had a vagal episode  -He does have a device under recall and the existing devices are under advisory for premature and unpredictable battery depletion  -This is NOT what happened with him, but I notified him of this regarding his device and recommended he should have a conversation with his EP regarding how to proceed   -He is otherwise on a pretty good regimen as far as medical therapy    Darrion Ware MD

## 2017-10-29 NOTE — ASSESSMENT & PLAN NOTE
"He mentions his baseline is \"2\".  Reduce diuresis and trend his creatinine.  At discharge, he is stable with Cr- at 2, less than on admission on reduced Lasix  Follow up to Dr. De(sp?) his PCP at [facility] in 1 week    "

## 2017-10-29 NOTE — PROGRESS NOTES
[facility] Hospitalist Progress Note    Date of Service: 10/29/2017    Chief Complaint  67 y.o. male admitted 10/28/2017 with syncope      Interval Problem Update  He wanted to go home. He however has elevated creatinine, swelling. His family is at bedside.He however has no chest pain, SOB or lightheadedness right now.    Consultants/Specialty      Disposition  Likely home        Review of Systems   Constitutional: Negative for chills and fever.   HENT: Negative for congestion, hearing loss and nosebleeds.    Eyes: Negative for pain and redness.   Respiratory: Positive for shortness of breath. Negative for cough, hemoptysis and wheezing.    Cardiovascular: Positive for orthopnea and leg swelling. Negative for chest pain and palpitations.   Gastrointestinal: Negative for abdominal pain, blood in stool, constipation, diarrhea, nausea and vomiting.   Genitourinary: Negative for dysuria, frequency and hematuria.   Musculoskeletal: Negative for falls, joint pain and myalgias.   Skin: Negative for rash.   Neurological: Negative for dizziness, tremors, focal weakness, seizures, loss of consciousness, weakness and headaches.   Psychiatric/Behavioral: The patient is not nervous/anxious and does not have insomnia.    All other systems reviewed and are negative.     Physical Exam  Laboratory/Imaging   Hemodynamics  Temp (24hrs), Av.7 °C (98 °F), Min:36.4 °C (97.5 °F), Max:37.1 °C (98.7 °F)   Temperature: 36.4 °C (97.5 °F) (error)  Pulse  Av.7  Min: 60  Max: 77 Heart Rate (Monitored): 78  Blood Pressure : 101/58, NIBP: 113/43      Respiratory      Respiration: 18, Pulse Oximetry: 95 %        RUL Breath Sounds: Clear, RML Breath Sounds: Clear, RLL Breath Sounds: Diminished, KE Breath Sounds: Clear, LLL Breath Sounds: Diminished    Fluids    Intake/Output Summary (Last 24 hours) at 10/29/17 1535  Last data filed at 10/29/17 0922   Gross per 24 hour   Intake                0 ml   Output             1025 ml   Net             -1025 ml       Nutrition  Orders Placed This Encounter   Procedures   • DIET ORDER     Standing Status:   Standing     Number of Occurrences:   1     Order Specific Question:   Diet:     Answer:   Regular [1]     Order Specific Question:   Miscellaneous modifications:     Answer:   No Decaf, No Caffeine(for test) [11]     Comments:   Protocol 1313 Patient to have no caffeine for 12 hours prior to exam (decaf, coffee, cola, tea, chocolate)     Physical Exam   Constitutional: He appears well-developed and well-nourished.   HENT:   Head: Normocephalic and atraumatic.   Eyes: Conjunctivae and EOM are normal. No scleral icterus.   Neck: Normal range of motion. Neck supple.   Cardiovascular: Normal rate and regular rhythm.  Exam reveals no gallop and no friction rub.    Murmur heard.  Pulmonary/Chest: Effort normal and breath sounds normal. No respiratory distress. He has no wheezes. He has no rales.   Abdominal: Soft. Bowel sounds are normal. He exhibits no distension. There is no tenderness. There is no rebound and no guarding.   Musculoskeletal: He exhibits edema (pitting, bilateral lower extremity). He exhibits no tenderness.   Neurological: He is alert.   Skin: Skin is warm.   Psychiatric: He has a normal mood and affect. His behavior is normal.       Recent Labs      10/28/17   1714  10/29/17   1100   WBC  6.2  6.6   RBC  4.22*  4.24*   HEMOGLOBIN  8.6*  8.6*   HEMATOCRIT  30.3*  30.2*   MCV  71.8*  71.2*   MCH  20.4*  20.3*   MCHC  28.4*  28.5*   RDW  49.8  49.8   PLATELETCT  196  192   MPV  10.1  10.2     Recent Labs      10/28/17   1714  10/29/17   1100   SODIUM  138  139   POTASSIUM  4.0  4.8   CHLORIDE  107  108   CO2  24  25   GLUCOSE  132*  192*   BUN  44*  39*   CREATININE  2.46*  2.26*   CALCIUM  7.3*  7.8*     Recent Labs      10/28/17   1714  10/29/17   0249   APTT  33.5   --    INR  2.98*  3.08*     Recent Labs      10/28/17   1714  10/29/17   0249   BNPBTYPENAT  518*  653*              Assessment/Plan    "  * Acute combined systolic and diastolic congestive heart failure, NYHA class 3 (CMS-HCC)   Assessment & Plan    Presented with syncope, was \"CPRed\" on the field  Followed by his cardiologist, Dr. Duarte and primary care, Dr. Yen, at [facility].  On high dose Lasix at home. May have been overdiuresed however he still has lower extremity swelling.  Elevated BNP  Will see his kidney reacts to this.        Anemia   Assessment & Plan    He mentions this is new to him  No melena, hematochezia, hematemesis or bleeding reported  Has CKD  Iron shows mixed iron deficiency (ferritin and sats low, TIBC normal)  Start PO iron supplementation  Defer to his primary care physician for GI work-up        CKD (chronic kidney disease) stage 3, GFR 30-59 ml/min   Assessment & Plan    He mentions his baseline is \"2\".  Reduce diuresis and trend his creatinine.        Syncope   Assessment & Plan    Presented with unresponsiveness, eyes rolled back. Has a significant history of arrhythmia and has a pacer.  Cardiology consulted. Dr. Darrion Ware did not think this was cardiac in origin. He mentioned to continue all his cardiac medications. Discussed with [facility] NP.  May have been related with orthostasis, overdiuresis. Is on high dose Lasix at home.        Obstructive sleep apnea   Assessment & Plan    Continue his CPAP        Type 2 diabetes mellitus (CMS-HCC)   Assessment & Plan    I ordered the sliding scale myself. Continue basal insulin.        Hyperlipidemia   Assessment & Plan    Continue statin        Circulating anticoagulants (CMS-HCC)   Assessment & Plan    On Coumadin for bypass, severe CAD according to him.  I ordered the warfarin myself; will get INR first and Pharmacy may have to dose depending on result.        I spent 98 minutes, 32 minutes of prolonged follow up reviewing the chart, notes, vitals, labs, imaging, ordering labs, evaluating Rell Pérez for assessment, enacting the plan above. 50% of the " time was spent in counseling Rell Pérez and family, answering all questions. Medical decision making is therefore complex. Time was devoted to counseling and coordinating care including review of records, pertinent lab data and studies, as well as discussing diagnostic evaluation and work up, planned therapeutic interventions and future disposition of care. Where indicated, the assessment and plan reflect discussion of patient with consultants, other healthcare providers, family members, and additional research needed to obtain further information in formulating the plan of care for Rell Pérez.       DVT prophylaxis pharmacological::  Warfarin (Coumadin)

## 2017-10-30 PROBLEM — R79.89 ELEVATED TROPONIN: Status: ACTIVE | Noted: 2017-01-01

## 2017-10-30 NOTE — DISCHARGE INSTRUCTIONS
Discharge Instructions    Discharged to home by car with relative. Discharged via wheelchair, hospital escort: Refused.  Special equipment needed: Not Applicable    Be sure to schedule a follow-up appointment with your primary care doctor or any specialists as instructed.     Discharge Plan:   Influenza Vaccine Indication: Not indicated: Previously immunized this influenza season and > 8 years of age    I understand that a diet low in cholesterol, fat, and sodium is recommended for good health. Unless I have been given specific instructions below for another diet, I accept this instruction as my diet prescription.     Special Instructions:   [facility] Patient Discharge Instructions  · Monitor your weight daily, and maintain a weight chart, to track your weight changes.   · Activity as tolerated, unless your Doctor has ordered otherwise.  · Follow a low fat, low cholesterol, low salt diet unless instructed otherwise by your Doctor. Read the labels on the back of food products and track your intake of fat, cholesterol and salt.   · Fluid Restriction No. If a Fluid Restriction has been ordered by your Doctor, measure fluids with a measuring cup to ensure that you are not exceeding the restriction.   · No smoking.  · Oxygen No. If your Doctor has ordered that you wear Oxygen at home, it is important to wear it as ordered.  · Did you receive an explanation from staff on the importance of taking each of your medications and why it is necessary to keep taking them unless your doctor says to stop? Yes  · Were all of your questions answered about how to manage your heart failure and what to do if you have increased signs and symptoms after you go home? Yes  · Do you feel like your heart failure care team involved you in the care treatment plan and allowed you to make decisions regarding your care while in the hospital and addressed any discharge needs you might have? Yes    See the educational handout provided at discharge for more  information on monitoring your daily weight, activity and diet. This also explains more about Heart Failure, symptoms of a flare-up and some of the tests that you have undergone.     Warning Signs of a Flare-Up include:  · Swelling in the ankles or lower legs.  · Shortness of breath, while at rest, or while doing normal activities.   · Shortness of breath at night when in bed, or coughing in bed.   · Requiring more pillows to sleep at night, or needing to sit up at night to sleep.  · Feeling weak, dizzy or fatigued.     When to call your Doctor:  · Call [facility] seven days a week from 8:00 a.m. to 8:00 p.m. for medical questions [phone].  · Call your Primary Care Physician or Cardiologist if:   1. You experience any pain radiating to your jaw or neck.  2. You have any difficulty breathing.  3. You experience weight gain of 3 lbs in a day or 5 lbs in a week.   4. You feel any palpitations or irregular heartbeats.  5. You become dizzy or lose consciousness.   If you have had an angiogram or had a pacemaker or AICD placed, and experience:  1. Bleeding, drainage or swelling at the surgical / puncture site.  2. Fever greater than 100.0 F  3. Shock from internal defibrillator.  4. Cool and / or numb extremities.      · Is patient discharged on Warfarin / Coumadin?   Yes    You are receiving the drug warfarin. Please understand the importance of monitoring warfarin with scheduled PT/INR blood draws.  Follow-up with a call to your personal Doctor's office in 3 days to schedule a PT/INR. .    IMPORTANT: HOW TO USE THIS INFORMATION:  This is a summary and does NOT have all possible information about this product. This information does not assure that this product is safe, effective, or appropriate for you. This information is not individual medical advice and does not substitute for the advice of your health care professional. Always ask your health care professional for complete information about this  "product and your specific health needs.      WARFARIN - ORAL (WARF-uh-rin)      COMMON BRAND NAME(S): Coumadin      WARNING:  Warfarin can cause very serious (possibly fatal) bleeding. This is more likely to occur when you first start taking this medication or if you take too much warfarin. To decrease your risk for bleeding, your doctor or other health care provider will monitor you closely and check your lab results (INR test) to make sure you are not taking too much warfarin. Keep all medical and laboratory appointments. Tell your doctor right away if you notice any signs of serious bleeding. See also Side Effects section.      USES:  This medication is used to treat blood clots (such as in deep vein thrombosis-DVT or pulmonary embolus-PE) and/or to prevent new clots from forming in your body. Preventing harmful blood clots helps to reduce the risk of a stroke or heart attack. Conditions that increase your risk of developing blood clots include a certain type of irregular heart rhythm (atrial fibrillation), heart valve replacement, recent heart attack, and certain surgeries (such as hip/knee replacement). Warfarin is commonly called a \"blood thinner,\" but the more correct term is \"anticoagulant.\" It helps to keep blood flowing smoothly in your body by decreasing the amount of certain substances (clotting proteins) in your blood.      HOW TO USE:  Read the Medication Guide provided by your pharmacist before you start taking warfarin and each time you get a refill. If you have any questions, ask your doctor or pharmacist. Take this medication by mouth with or without food as directed by your doctor or other health care professional, usually once a day. It is very important to take it exactly as directed. Do not increase the dose, take it more frequently, or stop using it unless directed by your doctor. Dosage is based on your medical condition, laboratory tests (such as INR), and response to treatment. Your doctor " or other health care provider will monitor you closely while you are taking this medication to determine the right dose for you. Use this medication regularly to get the most benefit from it. To help you remember, take it at the same time each day. It is important to eat a balanced, consistent diet while taking warfarin. Some foods can affect how warfarin works in your body and may affect your treatment and dose. Avoid sudden large increases or decreases in your intake of foods high in vitamin K (such as broccoli, cauliflower, cabbage, brussels sprouts, kale, spinach, and other green leafy vegetables, liver, green tea, certain vitamin supplements). If you are trying to lose weight, check with your doctor before you try to go on a diet. Cranberry products may also affect how your warfarin works. Limit the amount of cranberry juice (16 ounces/480 milliliters a day) or other cranberry products you may drink or eat.      SIDE EFFECTS:  Nausea, loss of appetite, or stomach/abdominal pain may occur. If any of these effects persist or worsen, tell your doctor or pharmacist promptly. Remember that your doctor has prescribed this medication because he or she has judged that the benefit to you is greater than the risk of side effects. Many people using this medication do not have serious side effects. This medication can cause serious bleeding if it affects your blood clotting proteins too much (shown by unusually high INR lab results). Even if your doctor stops your medication, this risk of bleeding can continue for up to a week. Tell your doctor right away if you have any signs of serious bleeding, including: unusual pain/swelling/discomfort, unusual/easy bruising, prolonged bleeding from cuts or gums, persistent/frequent nosebleeds, unusually heavy/prolonged menstrual flow, pink/dark urine, coughing up blood, vomit that is bloody or looks like coffee grounds, severe headache, dizziness/fainting, unusual or persistent  tiredness/weakness, bloody/black/tarry stools, chest pain, shortness of breath, difficulty swallowing. Tell your doctor right away if any of these unlikely but serious side effects occur: persistent nausea/vomiting, severe stomach/abdominal pain, yellowing eyes/skin. This drug rarely has caused very serious (possibly fatal) problems if its effects lead to small blood clots (usually at the beginning of treatment). This can lead to severe skin/tissue damage that may require surgery or amputation if left untreated. Patients with certain blood conditions (protein C or S deficiency) may be at greater risk. Get medical help right away if any of these rare but serious side effects occur: painful/red/purplish patches on the skin (such as on the toe, breast, abdomen), change in the amount of urine, vision changes, confusion, slurred speech, weakness on one side of the body. A very serious allergic reaction to this drug is rare. However, get medical help right away if you notice any symptoms of a serious allergic reaction, including: rash, itching/swelling (especially of the face/tongue/throat), severe dizziness, trouble breathing. This is not a complete list of possible side effects. If you notice other effects not listed above, contact your doctor or pharmacist. In the [place] - Call your doctor for medical advice about side effects. You may report side effects to FDA at 9-622-YZD-7786. In [place] - Call your doctor for medical advice about side effects. You may report side effects to [facility] [place] at [phone].      PRECAUTIONS:  Before taking warfarin, tell your doctor or pharmacist if you are allergic to it; or if you have any other allergies. This product may contain inactive ingredients, which can cause allergic reactions or other problems. Talk to your pharmacist for more details. Before using this medication, tell your doctor or pharmacist your medical history, especially of: blood disorders (such as anemia,  hemophilia), bleeding problems (such as bleeding of the stomach/intestines, bleeding in the brain), blood vessel disorders (such as aneurysms), recent major injury/surgery, liver disease, alcohol use, mental/mood disorders (including memory problems), frequent falls/injuries. It is important that all your doctors and dentists know that you take warfarin. Before having surgery or any medical/dental procedures, tell your doctor or dentist that you are taking this medication and about all the products you use (including prescription drugs, nonprescription drugs, and herbal products). Avoid getting injections into the muscles. If you must have an injection into a muscle (for example, a flu shot), it should be given in the arm. This way, it will be easier to check for bleeding and/or apply pressure bandages. This medication may cause stomach bleeding. Daily use of alcohol while using this medicine will increase your risk for stomach bleeding and may also affect how this medication works. Limit or avoid alcoholic beverages. If you have not been eating well, if you have an illness or infection that causes fever, vomiting, or diarrhea for more than 2 days, or if you start using any antibiotic medications, contact your doctor or pharmacist immediately because these conditions can affect how warfarin works. This medication can cause heavy bleeding. To lower the chance of getting cut, bruised, or injured, use great caution with sharp objects like safety razors and nail cutters. Use an electric razor when shaving and a soft toothbrush when brushing your teeth. Avoid activities such as contact sports. If you fall or injure yourself, especially if you hit your head, call your doctor immediately. Your doctor may need to check you. The Food & Drug Administration has stated that generic warfarin products are interchangeable. However, consult your doctor or pharmacist before switching warfarin products. Be careful not to take more  "than one medication that contains warfarin unless specifically directed by the doctor or health care provider who is monitoring your warfarin treatment. Older adults may be at greater risk for bleeding while using this drug. This medication is not recommended for use during pregnancy because of serious (possibly fatal) harm to an unborn baby. Discuss the use of reliable forms of birth control with your doctor. If you become pregnant or think you may be pregnant, tell your doctor immediately. If you are planning pregnancy, discuss a plan for managing your condition with your doctor before you become pregnant. Your doctor may switch the type of medication you use during pregnancy. Very small amounts of this medication may pass into breast milk but is unlikely to harm a nursing infant. Consult your doctor before breast-feeding.      DRUG INTERACTIONS:  Drug interactions may change how your medications work or increase your risk for serious side effects. This document does not contain all possible drug interactions. Keep a list of all the products you use (including prescription/nonprescription drugs and herbal products) and share it with your doctor and pharmacist. Do not start, stop, or change the dosage of any medicines without your doctor's approval. Warfarin interacts with many prescription, nonprescription, vitamin, and herbal products. This includes medications that are applied to the skin or inside the vagina or rectum. The interactions with warfarin usually result in an increase or decrease in the \"blood-thinning\" (anticoagulant) effect. Your doctor or other health care professional should closely monitor you to prevent serious bleeding or clotting problems. While taking warfarin, it is very important to tell your doctor or pharmacist of any changes in medications, vitamins, or herbal products that you are taking. Some products that may interact with this drug include: capecitabine, imatinib, mifepristone. " Aspirin, aspirin-like drugs (salicylates), and nonsteroidal anti-inflammatory drugs (NSAIDs such as ibuprofen, naproxen, celecoxib) may have effects similar to warfarin. These drugs may increase the risk of bleeding problems if taken during treatment with warfarin. Carefully check all prescription/nonprescription product labels (including drugs applied to the skin such as pain-relieving creams) since the products may contain NSAIDs or salicylates. Talk to your doctor about using a different medication (such as acetaminophen) to treat pain/fever. Low-dose aspirin and related drugs (such as clopidogrel, ticlopidine) should be continued if prescribed by your doctor for specific medical reasons such as heart attack or stroke prevention. Consult your doctor or pharmacist for more details. Many herbal products interact with warfarin. Tell your doctor before taking any herbal products, especially bromelains, coenzyme Q10, cranberry, danshen, dong quai, fenugreek, garlic, ginkgo biloba, ginseng, and Ilda's wort, among others. This medication may interfere with a certain laboratory test to measure theophylline levels, possibly causing false test results. Make sure laboratory personnel and all your doctors know you use this drug.      OVERDOSE:  If overdose is suspected, contact a poison control center or emergency room immediately. [place] residents can call the [place] National Poison Hotline at 1-238.252.1267. [place] residents can call a provincial poison control center. Symptoms of overdose may include: bloody/black/tarry stools, pink/dark urine, unusual/prolonged bleeding.      NOTES:  Do not share this medication with others. Laboratory and/or medical tests (such as INR, complete blood count) must be performed periodically to monitor your progress or check for side effects. Consult your doctor for more details.      MISSED DOSE:  For the best possible benefit, do not miss any doses. If you do miss a dose and remember on the  same day, take it as soon as you remember. If you remember on the next day, skip the missed dose and resume your usual dosing schedule. Do not double the dose to catch up because this could increase your risk for bleeding. Keep a record of missed doses to give to your doctor or pharmacist. Contact your doctor or pharmacist if you miss 2 or more doses in a row.      STORAGE:  Store at room temperature away from light and moisture. Do not store in the bathroom. Keep all medications away from children and pets. Do not flush medications down the toilet or pour them into a drain unless instructed to do so. Properly discard this product when it is  or no longer needed. Consult your pharmacist or local waste disposal company for more details about how to safely discard your product.      MEDICAL ALERT:  Your condition and medication can cause complications in a medical emergency. For information about enrolling in MedicAlert, call 1-124.395.4081 ([place]) or 1-745.192.5147 ([place]).      Information last revised 2010 Copyright(c) 2010 [employer]             · Is patient Post Blood Transfusion?  No      Syncope  Syncope is a medical term for fainting or passing out. This means you lose consciousness and drop to the ground. People are generally unconscious for less than 5 minutes. You may have some muscle twitches for up to 15 seconds before waking up and returning to normal. Syncope occurs more often in older adults, but it can happen to anyone. While most causes of syncope are not dangerous, syncope can be a sign of a serious medical problem. It is important to seek medical care.   CAUSES   Syncope is caused by a sudden drop in blood flow to the brain. The specific cause is often not determined. Factors that can bring on syncope include:  · Taking medicines that lower blood pressure.  · Sudden changes in posture, such as standing up quickly.  · Taking more medicine than prescribed.  · Standing in one  place for too long.  · Seizure disorders.  · Dehydration and excessive exposure to heat.  · Low blood sugar (hypoglycemia).  · Straining to have a bowel movement.  · Heart disease, irregular heartbeat, or other circulatory problems.  · Fear, emotional distress, seeing blood, or severe pain.  SYMPTOMS   Right before fainting, you may:  · Feel dizzy or light-headed.  · Feel nauseous.  · See all white or all black in your field of vision.  · Have cold, clammy skin.  DIAGNOSIS   Your health care provider will ask about your symptoms, perform a physical exam, and perform an electrocardiogram (ECG) to record the electrical activity of your heart. Your health care provider may also perform other heart or blood tests to determine the cause of your syncope which may include:  · Transthoracic echocardiogram (TTE). During echocardiography, sound waves are used to evaluate how blood flows through your heart.  · Transesophageal echocardiogram (PEDRO).  · Cardiac monitoring. This allows your health care provider to monitor your heart rate and rhythm in real time.  · Holter monitor. This is a portable device that records your heartbeat and can help diagnose heart arrhythmias. It allows your health care provider to track your heart activity for several days, if needed.  · Stress tests by exercise or by giving medicine that makes the heart beat faster.  TREATMENT   In most cases, no treatment is needed. Depending on the cause of your syncope, your health care provider may recommend changing or stopping some of your medicines.  HOME CARE INSTRUCTIONS  · Have someone stay with you until you feel stable.  · Do not drive, use machinery, or play sports until your health care provider says it is okay.  · Keep all follow-up appointments as directed by your health care provider.  · Lie down right away if you start feeling like you might faint. Breathe deeply and steadily. Wait until all the symptoms have passed.  · Drink enough fluids to keep  your urine clear or pale yellow.  · If you are taking blood pressure or heart medicine, get up slowly and take several minutes to sit and then stand. This can reduce dizziness.  SEEK IMMEDIATE MEDICAL CARE IF:   · You have a severe headache.  · You have unusual pain in the chest, abdomen, or back.  · You are bleeding from your mouth or rectum, or you have black or tarry stool.  · You have an irregular or very fast heartbeat.  · You have pain with breathing.  · You have repeated fainting or seizure-like jerking during an episode.  · You faint when sitting or lying down.  · You have confusion.  · You have trouble walking.  · You have severe weakness.  · You have vision problems.  If you fainted, call your local emergency services (911 in [place] Do not drive yourself to the hospital.      This information is not intended to replace advice given to you by your health care provider. Make sure you discuss any questions you have with your health care provider.     Document Released: 12/18/2006 Document Revised: 05/03/2016 Document Reviewed: 02/15/2013  [employer] Interactive Patient Education ©2016 [employer]    Anemia, Nonspecific  Anemia is a condition in which the concentration of red blood cells or hemoglobin in the blood is below normal. Hemoglobin is a substance in red blood cells that carries oxygen to the tissues of the body. Anemia results in not enough oxygen reaching these tissues.   CAUSES   Common causes of anemia include:   · Excessive bleeding. Bleeding may be internal or external. This includes excessive bleeding from periods (in women) or from the intestine.    · Poor nutrition.    · Chronic kidney, thyroid, and liver disease.   · Bone marrow disorders that decrease red blood cell production.  · Cancer and treatments for cancer.  · HIV, AIDS, and their treatments.  · Spleen problems that increase red blood cell destruction.  · Blood disorders.  · Excess destruction of red blood cells due to infection,  medicines, and autoimmune disorders.  SIGNS AND SYMPTOMS   · Minor weakness.    · Dizziness.    · Headache.  · Palpitations.    · Shortness of breath, especially with exercise.    · Paleness.  · Cold sensitivity.  · Indigestion.  · Nausea.  · Difficulty sleeping.  · Difficulty concentrating.  Symptoms may occur suddenly or they may develop slowly.   DIAGNOSIS   Additional blood tests are often needed. These help your health care provider determine the best treatment. Your health care provider will check your stool for blood and look for other causes of blood loss.   TREATMENT   Treatment varies depending on the cause of the anemia. Treatment can include:   · Supplements of iron, vitamin B12, or folic acid.    · Hormone medicines.    · A blood transfusion. This may be needed if blood loss is severe.    · Hospitalization. This may be needed if there is significant continual blood loss.    · Dietary changes.  · Spleen removal.  HOME CARE INSTRUCTIONS  Keep all follow-up appointments. It often takes many weeks to correct anemia, and having your health care provider check on your condition and your response to treatment is very important.  SEEK IMMEDIATE MEDICAL CARE IF:   · You develop extreme weakness, shortness of breath, or chest pain.    · You become dizzy or have trouble concentrating.  · You develop heavy vaginal bleeding.    · You develop a rash.    · You have bloody or black, tarry stools.    · You faint.    · You vomit up blood.    · You vomit repeatedly.    · You have abdominal pain.  · You have a fever or persistent symptoms for more than 2-3 days.    · You have a fever and your symptoms suddenly get worse.    · You are dehydrated.    MAKE SURE YOU:  · Understand these instructions.  · Will watch your condition.  · Will get help right away if you are not doing well or get worse.     This information is not intended to replace advice given to you by your health care provider. Make sure you discuss any  questions you have with your health care provider.     Document Released: 01/25/2006 Document Revised: 08/20/2014 Document Reviewed: 06/13/2014  [employer] Interactive Patient Education ©2016 [employer]      Depression / Suicide Risk    As you are discharged from this [facility] facility, it is important to learn how to keep safe from harming yourself.    Recognize the warning signs:  · Abrupt changes in personality, positive or negative- including increase in energy   · Giving away possessions  · Change in eating patterns- significant weight changes-  positive or negative  · Change in sleeping patterns- unable to sleep or sleeping all the time   · Unwillingness or inability to communicate  · Depression  · Unusual sadness, discouragement and loneliness  · Talk of wanting to die  · Neglect of personal appearance   · Rebelliousness- reckless behavior  · Withdrawal from people/activities they love  · Confusion- inability to concentrate     If you or a loved one observes any of these behaviors or has concerns about self-harm, here's what you can do:  · Talk about it- your feelings and reasons for harming yourself  · Remove any means that you might use to hurt yourself (examples: pills, rope, extension cords, firearm)  · Get professional help from the community (Mental Health, Substance Abuse, psychological counseling)  · Do not be alone:Call your Safe Contact- someone whom you trust who will be there for you.  · Call your local CRISIS HOTLINE 296-1408 or 665-183-0195  · Call your local Children's Mobile Crisis Response Team [place] (404) 253-5056 or www.[employer]  · Call the toll free National Suicide Prevention Hotlines   · National Suicide Prevention Lifeline 546-438-VYCQ (4851)  · National Hope Line Network 800-SUICIDE (414-7689)

## 2017-10-30 NOTE — PROGRESS NOTES
Inpatient Anticoagulation Service Note    Date: 10/29/2017  Reason for Anticoagulation: Atrial Fibrillation  Hemoglobin Value: (!) 8.6  Hematocrit Value: (!) 30.2  Lab Platelet Value: 192  Target INR: 2.0 to 3.0  INR from last 7 days     Date/Time INR Value    10/29/17 0249 (!)  3.08    10/28/17 1714 (!)  2.98        Dose from last 7 days     Date/Time Dose (mg)    10/29/17 1600  1    10/28/17 2100  2        Average Dose (mg):  (2mg Wed,Fri,Sat - 3mg AOD)  Significant Interactions: Aspirin, Statin  Bridge Therapy: No     Comments:   INR on high end of goal again today. Could be due to different diet as inpatient.  INR could uptrend from CHF exacerbation.  Lower dose tonight.  No signs/symptoms of bleeding. Monitor.  Started on iron supplement. Could make stools appear melanotic.     Plan:  Warfarin 1mg tonight. Likely home dose tomorrow. Trend INR. Pharmacy will follow.  Education Material Provided?: No (Chronic warfarin)  Pharmacist suggested discharge dosing: Likely the home dose.     Ta Ward, PharmD, BCPS

## 2017-10-30 NOTE — PROGRESS NOTES
Received report from day shift RN. Twelve hour chart check completed. Patient assessed. Patient A and 0 X 4.  Patient states that pain is a 0 out of 10.  Patient educated on plan of care for the evening including medications per MAR, monitoring vital signs, safety, and rest. Patient educated to call for assistance. Patient verbalizes understanding.

## 2017-10-30 NOTE — CARE PLAN
Problem: Safety  Goal: Will remain free from injury  Outcome: PROGRESSING AS EXPECTED  Bed in low position.  Treaded socks on patient.  Call light within reach.  Saline locked.  Bedrails closest to bathroom down.  Patient educated to call for assistance.       Problem: Communication  Goal: The ability to communicate needs accurately and effectively will improve    Intervention: Educate patient and significant other/support system about the plan of care, procedures, treatments, medications and allow for questions  Bedside report completed. Patient educated on plan of care, call light, and communication board. Patient verbalizes understanding.

## 2017-10-30 NOTE — PROGRESS NOTES
Pt discharged home. IV dc 'd, tip intact. No s/s of infection or bleeding. Discharge instructions discussed with pt and wife; they verbalized understanding and denied questions. Pt follow up with PCP set prior to dc; pt to follow up back home in [place]. Pt left via wheelchair with hospital transport. All bedside belongings sent with pt

## 2017-10-30 NOTE — PROGRESS NOTES
Bedside report received, assumed pt care @0490. Pt A&Ox4. He denies any pain. POC discussed, he verbalized understanding. Call light within reach

## 2017-10-30 NOTE — ASSESSMENT & PLAN NOTE
Only mildly elevated. Likely NSTEMI type 2 from demand ischemia/poor troponin clearance from CKD, and CHF  Dr. Darrion Ware, Cardiology evaluated him and feels this is not ischemic. He cancelled the stress test.

## 2017-10-30 NOTE — PROGRESS NOTES
Marisa Purdy Fall Risk Assessment:     Last Known Fall: No falls  Mobility: Dizziness/generalized weakness  Medications: Cardiovascular or central nervous system meds  Mental Status/LOC/Awareness: Awake, alert, and oriented to date, place, and person  Toileting Needs: Use of assistive device (Bedside commode, bedpan, urinal)  Volume/Electrolyte Status: No problems  Communication/Sensory: No deficits  Behavior: Appropriate behavior  Marisa Purdy Fall Risk Total: 7  Fall Risk Level: LOW RISK    Universal Fall Precautions:  call light/belongings in reach, bed in low position and locked, siderails up x 2, adequate lighting, use non-slip footwear, clutter free and spill free environment, educate to call for assistance, educate on level of risk    Fall Risk Level Interventions:   TRIAL (TELE 8, NEURO, MED KEATON 5) Low Fall Risk Interventions  Place yellow fall risk ID band on patient: refused  Provide patient/family education based on risk assessment: completed  Educate patient/family to call staff for assistance when getting out of bed: completed  Place fall precaution signage outside patient door: completed      Patient Specific Interventions:     Medication: review medications with patient and family  Mental Status/LOC/Awareness: reinforce falls education and reinforce the use of call light  Toileting: monitor intake and output/use of appropriate interventions  Volume/Electrolyte Status: monitor abnormal lab values  Communication/Sensory: update plan of care on whiteboard and ensure proper positioning when transferrng/ambulating  Behavioral: encourage patient to voice feelings and engage patient in daily activities  Mobility: utilize bed/chair fall alarm and ensure bed is locked and in lowest position

## 2017-10-30 NOTE — DISCHARGE SUMMARY
"CHIEF COMPLAINT ON ADMISSION  Chief Complaint   Patient presents with   • Other       CODE STATUS  Full Code    HPI & HOSPITAL COURSE  Please review Dr. Bashir Mejia M.D. notes for further details of history of present illness, past medical/social/family histories, allergies and medications. Please review Dr. Darrion Ware consultation notes.    * Acute combined systolic and diastolic congestive heart failure, NYHA class 3 (CMS-[facility])   Assessment & Plan    Presented with syncope, was \"CPRed\" on the field  Followed by his cardiologist, Dr. Duarte and primary care, Dr. Yen, at [facility].  On high dose Lasix at home. May have been overdiuresed however he still has lower extremity swelling.  Elevated BNP  At discharge date he improved and wants to go home. He is afebrile and hemodynamically stable. He walked with nursing and did not require oxygen and was not having lightheadedness, chest pain, shortness of breath (has chronic MANCINI). I offerred home health care which he had before but he mentioned that he will have that arranged with his primary care doctor at [facility]. His wife was at bedside confirming this. I advised him to check weights daily and follow a dry weight, fluid restriction, and follow closely with heart failure team at [facility] for Lasiox tritration. His internal medicine doctor torito scruggs is also a kidney specialist, hence will need follow up.   Follow up to Dr. De(sp?) his PCP at [facility] in 1 week  Follow up with Dr. Duarte Cardiologist and his CHF team there at [facility] in 1 week        Anemia   Assessment & Plan    He mentions this is new to him  No melena, hematochezia, hematemesis or bleeding reported  Has CKD  Iron shows mixed iron deficiency (ferritin and sats low, TIBC normal)  Start PO iron supplementation  Defer to his primary care physician for GI work-up  Follow up to Dr. De(sp?) his PCP at [facility] in 1 week. Recommend referral to GI and " "evaluate as well for epoietin        CKD (chronic kidney disease) stage 3, GFR 30-59 ml/min   Assessment & Plan    He mentions his baseline is \"2\".  Reduce diuresis and trend his creatinine.  At discharge, he is stable with Cr- at 2, less than on admission on reduced Lasix  Follow up to Dr. De(sp?) his PCP at [facility] in 1 week          Syncope   Assessment & Plan    Presented with unresponsiveness, eyes rolled back. Has a significant history of arrhythmia and has a pacer.  Cardiology consulted. Dr. Darrion Ware did not think this was cardiac in origin. He mentioned to continue all his cardiac medications. Discussed with [facility] Cardiology NP.  May have been related with orthostasis, overdiuresis. Is on high dose Lasix at home.  No syncope here. His blood pressures are stable on reduced Lasix. Likely orthostasis from fluid losses.  Follow up to Dr. De(sp?) his PCP at [facility] in 1 week  Follow up with Dr. Duarte Cardiologist and his CHF team there at [facility] in 1 week        Elevated troponin   Assessment & Plan    Only mildly elevated. Likely NSTEMI type 2 from demand ischemia/poor troponin clearance from CKD, and CHF  Dr. Darrion Ware, Cardiology evaluated him and feels this is not ischemic. He cancelled the stress test.        Obstructive sleep apnea   Assessment & Plan    Continue his CPAP        Type 2 diabetes mellitus (CMS-HCC)   Assessment & Plan    I ordered the sliding scale myself. Continue basal insulin.  Follow up to Dr. De(sp?) his PCP at [facility] in 1 week  Follow up with Dr. Duarte Cardiologist and his CHF team there at [facility] in 1 week        Hyperlipidemia   Assessment & Plan    Continue statin        Circulating anticoagulants (CMS-HCC)   Assessment & Plan    On Coumadin for bypass, severe CAD according to him.  I ordered the warfarin myself; will get INR first and Pharmacy may have to dose depending on result.            Discharge Physical " Exam  Constitutional: He appears well-developed and well-nourished.   HENT:   Head: Normocephalic and atraumatic.   Eyes: Conjunctivae and EOM are normal. No scleral icterus.   Neck: Normal range of motion. Neck supple.   Cardiovascular: Normal rate and regular rhythm.  Exam reveals no gallop and no friction rub.    Murmur heard.  Pulmonary/Chest: Effort normal and breath sounds normal. No respiratory distress. He has no wheezes. He has no rales.   Abdominal: Soft. Bowel sounds are normal. He exhibits no distension. There is no tenderness. There is no rebound and no guarding.   Musculoskeletal: He exhibits edema (pitting, bilateral lower extremity). He exhibits no tenderness.   Neurological: He is alert.   Skin: Skin is warm.   Psychiatric: He has a normal mood and affect. His behavior is normal.          Therefore, he is discharged in good and stable condition with close outpatient follow-up.    SPECIFIC OUTPATIENT FOLLOW-UP  Follow up to Dr. De(sp?) his PCP at [facility] in 1 week  Follow up with Dr. Duarte Cardiologist and his CHF team there at [facility] in 1 week  Follow up with anticoagulation clinic there for contiued monitoring and warfarin titration  Recommend labs to be obtained to follow Cr-, K+; GI referral and epoietin evaluation    DISCHARGE PROBLEM LIST  Principal Problem:    Acute combined systolic and diastolic congestive heart failure, NYHA class 3 (CMS-HCC) POA: Unknown  Active Problems:    Syncope POA: Unknown    CKD (chronic kidney disease) stage 3, GFR 30-59 ml/min POA: Unknown    Anemia POA: Unknown    Circulating anticoagulants (CMS-HCC) POA: Unknown    Hyperlipidemia POA: Unknown    Type 2 diabetes mellitus (CMS-HCC) POA: Unknown    Obstructive sleep apnea POA: Unknown    Elevated troponin POA: Unknown  Resolved Problems:    * No resolved hospital problems. *      FOLLOW UP  No future appointments.  No follow-up provider specified.    MEDICATIONS ON DISCHARGE   Rell Pérez    Home Medication Instructions [facility]:[id number]    Printed on:10/30/17 7021   Medication Information                      acetaminophen (TYLENOL) 500 MG Tab  Take 1,000 mg by mouth every 6 hours as needed for Moderate Pain.             aspirin 81 MG tablet  Take 81 mg by mouth every morning.             atorvastatin (LIPITOR) 40 MG Tab  Take 40 mg by mouth every evening.             carvedilol (COREG) 25 MG Tab  Take 25 mg by mouth 2 times a day, with meals.             ferrous gluconate (FERGON) 324 (38 Fe) MG Tab  Take 1 Tab by mouth 3 times a day, with meals.             furosemide (LASIX) 20 MG Tab  Take 2 Tabs by mouth 2 times a day.             hydrALAZINE (APRESOLINE) 25 MG Tab  Take 12.5 mg by mouth 2 Times a Day.             insulin NPH (HUMULIN,NOVOLIN) 100 UNIT/ML Suspension  Inject 34-40 Units as instructed 2 times a day, with meals. Pt mixes with Humulin R BID with meals  40 units in the morning, 34 units in the evening             insulin regular (HUMULIN R) 100 Unit/mL Solution  Inject 8-20 Units as instructed 3 times a day before meals. Pt mixes with Humulin N for breakfast and dinner, then uses alone at lunchtime  16-22 units at breakfast  8-16 units at lunch  16-20 units at dinner  Depending on blood sugar/what pt eats             isosorbide mononitrate SR (IMDUR) 30 MG TABLET SR 24 HR  Take 30 mg by mouth every morning.             potassium chloride SA (KDUR) 20 MEQ Tab CR  Take 1 Tab by mouth every day.             sodium chloride 7% Solution  3 mL by Nebulization route as needed (shortness of breath).             warfarin (COUMADIN) 2 MG Tab  Take 2-3 mg by mouth every evening. 2mg on Wednesday/Friday/Saturday  3mg on Sunday/Monday/Tuesday/Thursday                 DIET  Orders Placed This Encounter   Procedures   • DIET ORDER     Standing Status:   Standing     Number of Occurrences:   1     Order Specific Question:   Diet:     Answer:   Diabetic [3]     Order Specific Question:   Miscellaneous  modifications:     Answer:   No Decaf, No Caffeine(for test) [11]     Comments:   Protocol 1313 Patient to have no caffeine for 12 hours prior to exam (decaf, coffee, cola, tea, chocolate)       ACTIVITY  NO streuous activity or lifting  As per Cardiology    CONSULTATIONS  Cardiology    PROCEDURES  Dx-chest-portable (1 View)    Result Date: 10/28/2017  10/28/2017 5:16 PM HISTORY/REASON FOR EXAM:  Cough. Near syncope. TECHNIQUE/EXAM DESCRIPTION AND NUMBER OF VIEWS: Single portable view of the chest. COMPARISON: None available. FINDINGS: There are median sternotomy wires. There is a pacemaker with leads projecting over the cardiac silhouette. The mediastinal and cardiac silhouette is enlarged. The central vessels are engorged and ill-defined. The lung parenchyma is clear. There is no significant pleural effusion. There is no visible pneumothorax. There is an old left clavicle fracture. There is postoperative change in the right shoulder.     1.  Enlarged cardiac silhouette with central vascular congestion/edema.    Echocardiogram-comp W/ Cont    Result Date: 10/29/2017  Transthoracic Echo Report Echocardiography Laboratory CONCLUSIONS No prior study is available for comparison. Left ventricle is mildly dilated. Left ventricular ejection fraction is visually estimated to be 20%. Mild mitral regurgitation. Moderate tricuspid regurgitation. Estimated right ventricular systolic pressure  is 50 mmHg. PAM DE LEON Exam Date:         10/29/2017                    13:57 Exam Location:     Inpatient Priority:          Call Back Ordering Physician:        BRANDO MATHEW Referring Physician: Sonographer:               Rojas Flores RDCS Age:    67     Gender:    M MRN:    [id number] :    1950 BSA:    2.15   Ht (in):    69     Wt (lb):    220 Exam Type:     Complete, Contrast Indications:     Abnormal EKG ICD Codes:       794.31 CPT Codes:       75668,  BP:          /          HR: Technical Quality:       Technically  difficult study                          incomplete information is                          obtained MEASUREMENTS  (Male / Female) Normal Values 2D ECHO LV Diastolic Diameter PLAX        6.1 cm                4.2 - 5.9 / 3.9 - 5.3 cm LV Systolic Diameter PLAX         5.6 cm                2.1 - 4.0 cm IVS Diastolic Thickness           0.92 cm               LVPW Diastolic Thickness          1 cm                  LVOT Diameter                     2.1 cm                Estimated LV Ejection Fraction    20 %                  DOPPLER AV Peak Velocity                  0.96 m/s              AV Peak Gradient                  3.7 mmHg              AV Mean Gradient                  2 mmHg                MV Velocity Time Integral         21.3 cm               TR Peak Velocity                  292 cm/s              PV Peak Velocity                  1 m/s                 PV Peak Gradient                  4 mmHg                PV Mean Gradient                  2.1 mmHg              * Indicates values subject to auto-interpretation LV EF:  20    % FINDINGS Left Ventricle Contrast was used to enhance visualization of the endocardial border. 3 ML of contrast was administered. Left ventricle is mildly dilated. Normal left ventricular wall thickness. Severely reduced left ventricular systolic function. Left ventricular ejection fraction is visually estimated to be 20%. Global hypokinesis. A reliable estimation of diastolic function cannot be made. Right Ventricle Right ventricle not well visualized. Right Atrium Right atrium not well visualized. Left Atrium Left atrium is enlarged. LA volume index could not be obtained. Mitral Valve Structurally normal mitral valve. No mitral stenosis. Mild mitral regurgitation. Aortic Valve Structurally normal aortic valve. No aortic stenosis. No aortic insufficiency. Tricuspid Valve Structurally normal tricuspid valve. No tricuspid stenosis. Moderate tricuspid regurgitation. Estimated right  ventricular systolic pressure  is 50 mmHg. Pulmonic Valve Structurally normal pulmonic valve. No pulmonic stenosis. Trace pulmonic insufficiency. Pericardium Normal pericardium without effusion. Aorta Normal aortic root for body surface area. Ascending aorta diameter is 3.2 cm. Juan J Sawyer MD (Electronically Signed) Final Date:     29 October 2017                 15:25      LABORATORY  Lab Results   Component Value Date/Time    SODIUM 141 10/30/2017 03:58 AM    POTASSIUM 4.3 10/30/2017 03:58 AM    CHLORIDE 109 10/30/2017 03:58 AM    CO2 25 10/30/2017 03:58 AM    GLUCOSE 101 (H) 10/30/2017 03:58 AM    BUN 42 (H) 10/30/2017 03:58 AM    CREATININE 2.28 (H) 10/30/2017 03:58 AM        Lab Results   Component Value Date/Time    WBC 5.9 10/30/2017 03:58 AM    HEMOGLOBIN 8.4 (L) 10/30/2017 03:58 AM    HEMATOCRIT 30.2 (L) 10/30/2017 03:58 AM    PLATELETCT 218 10/30/2017 03:58 AM        Total time of the discharge process exceeds 40 minutes   I spent a total of 99 minutes, 35 minutes of prolonged follow up, explaining to the family and Rell Pérez regarding severe congestive heart failure and the indications for diuresis, pacer-defib and reviewing the charts.

## 2018-01-01 ENCOUNTER — APPOINTMENT (OUTPATIENT)
Dept: RADIOLOGY | Facility: MEDICAL CENTER | Age: 68
DRG: 302 | End: 2018-01-01
Attending: INTERNAL MEDICINE
Payer: MEDICARE

## 2018-01-01 ENCOUNTER — APPOINTMENT (OUTPATIENT)
Dept: RADIOLOGY | Facility: MEDICAL CENTER | Age: 68
DRG: 302 | End: 2018-01-01
Attending: EMERGENCY MEDICINE
Payer: MEDICARE

## 2018-01-01 ENCOUNTER — HOSPITAL ENCOUNTER (INPATIENT)
Facility: MEDICAL CENTER | Age: 68
LOS: 6 days | DRG: 302 | End: 2018-07-06
Attending: EMERGENCY MEDICINE | Admitting: HOSPITALIST
Payer: MEDICARE

## 2018-01-01 VITALS
DIASTOLIC BLOOD PRESSURE: 51 MMHG | HEART RATE: 74 BPM | BODY MASS INDEX: 36.87 KG/M2 | WEIGHT: 248.9 LBS | SYSTOLIC BLOOD PRESSURE: 100 MMHG | TEMPERATURE: 99.3 F | HEIGHT: 69 IN | OXYGEN SATURATION: 97 %

## 2018-01-01 DIAGNOSIS — I49.9 VENTRICULAR ARRHYTHMIA: ICD-10-CM

## 2018-01-01 DIAGNOSIS — J96.00 ACUTE RESPIRATORY FAILURE, UNSPECIFIED WHETHER WITH HYPOXIA OR HYPERCAPNIA (HCC): ICD-10-CM

## 2018-01-01 DIAGNOSIS — I46.9 CARDIAC ARREST (HCC): ICD-10-CM

## 2018-01-01 LAB
ACTION RANGE TRIGGERED IACRT: NO
ACTION RANGE TRIGGERED IACRT: YES
ALBUMIN SERPL BCP-MCNC: 1.7 G/DL (ref 3.2–4.9)
ALBUMIN SERPL BCP-MCNC: 1.7 G/DL (ref 3.2–4.9)
ALBUMIN SERPL BCP-MCNC: 1.8 G/DL (ref 3.2–4.9)
ALBUMIN/GLOB SERPL: 0.6 G/DL
ALBUMIN/GLOB SERPL: 0.7 G/DL
ALP SERPL-CCNC: 58 U/L (ref 30–99)
ALP SERPL-CCNC: 61 U/L (ref 30–99)
ALT SERPL-CCNC: 18 U/L (ref 2–50)
ALT SERPL-CCNC: 24 U/L (ref 2–50)
ANION GAP SERPL CALC-SCNC: 10 MMOL/L (ref 0–11.9)
ANION GAP SERPL CALC-SCNC: 10 MMOL/L (ref 0–11.9)
ANION GAP SERPL CALC-SCNC: 11 MMOL/L (ref 0–11.9)
ANION GAP SERPL CALC-SCNC: 12 MMOL/L (ref 0–11.9)
ANION GAP SERPL CALC-SCNC: 12 MMOL/L (ref 0–11.9)
ANION GAP SERPL CALC-SCNC: 13 MMOL/L (ref 0–11.9)
ANION GAP SERPL CALC-SCNC: 14 MMOL/L (ref 0–11.9)
ANION GAP SERPL CALC-SCNC: 15 MMOL/L (ref 0–11.9)
ANION GAP SERPL CALC-SCNC: 8 MMOL/L (ref 0–11.9)
ANION GAP SERPL CALC-SCNC: 9 MMOL/L (ref 0–11.9)
ANION GAP SERPL CALC-SCNC: 9 MMOL/L (ref 0–11.9)
ANISOCYTOSIS BLD QL SMEAR: ABNORMAL
APTT PPP: 35 SEC (ref 24.7–36)
APTT PPP: 35 SEC (ref 24.7–36)
APTT PPP: 36.1 SEC (ref 24.7–36)
APTT PPP: 44.5 SEC (ref 24.7–36)
APTT PPP: 47.1 SEC (ref 24.7–36)
APTT PPP: 54 SEC (ref 24.7–36)
APTT PPP: 59.4 SEC (ref 24.7–36)
APTT PPP: 60.9 SEC (ref 24.7–36)
APTT PPP: 66.2 SEC (ref 24.7–36)
APTT PPP: 74.2 SEC (ref 24.7–36)
AST SERPL-CCNC: 27 U/L (ref 12–45)
AST SERPL-CCNC: 37 U/L (ref 12–45)
BASE EXCESS BLDA CALC-SCNC: -2 MMOL/L (ref -4–3)
BASE EXCESS BLDA CALC-SCNC: -4 MMOL/L (ref -4–3)
BASE EXCESS BLDA CALC-SCNC: -5 MMOL/L (ref -4–3)
BASE EXCESS BLDA CALC-SCNC: -5 MMOL/L (ref -4–3)
BASE EXCESS BLDA CALC-SCNC: -7 MMOL/L (ref -4–3)
BASE EXCESS BLDA CALC-SCNC: -8 MMOL/L (ref -4–3)
BASE EXCESS BLDA CALC-SCNC: 2 MMOL/L (ref -4–3)
BASE EXCESS BLDA CALC-SCNC: 4 MMOL/L (ref -4–3)
BASOPHILS # BLD AUTO: 0 % (ref 0–1.8)
BASOPHILS # BLD AUTO: 0.2 % (ref 0–1.8)
BASOPHILS # BLD AUTO: 0.3 % (ref 0–1.8)
BASOPHILS # BLD AUTO: 0.3 % (ref 0–1.8)
BASOPHILS # BLD AUTO: 0.4 % (ref 0–1.8)
BASOPHILS # BLD AUTO: 0.5 % (ref 0–1.8)
BASOPHILS # BLD: 0 K/UL (ref 0–0.12)
BASOPHILS # BLD: 0.02 K/UL (ref 0–0.12)
BASOPHILS # BLD: 0.02 K/UL (ref 0–0.12)
BASOPHILS # BLD: 0.03 K/UL (ref 0–0.12)
BASOPHILS # BLD: 0.03 K/UL (ref 0–0.12)
BASOPHILS # BLD: 0.04 K/UL (ref 0–0.12)
BASOPHILS # BLD: 0.07 K/UL (ref 0–0.12)
BASOPHILS # BLD: 0.07 K/UL (ref 0–0.12)
BILIRUB SERPL-MCNC: 0.8 MG/DL (ref 0.1–1.5)
BILIRUB SERPL-MCNC: 1 MG/DL (ref 0.1–1.5)
BNP SERPL-MCNC: 338 PG/ML (ref 0–100)
BODY TEMPERATURE: ABNORMAL DEGREES
BODY TEMPERATURE: NORMAL DEGREES
BUN SERPL-MCNC: 40 MG/DL (ref 8–22)
BUN SERPL-MCNC: 43 MG/DL (ref 8–22)
BUN SERPL-MCNC: 44 MG/DL (ref 8–22)
BUN SERPL-MCNC: 46 MG/DL (ref 8–22)
BUN SERPL-MCNC: 48 MG/DL (ref 8–22)
BUN SERPL-MCNC: 49 MG/DL (ref 8–22)
BUN SERPL-MCNC: 49 MG/DL (ref 8–22)
BUN SERPL-MCNC: 50 MG/DL (ref 8–22)
BUN SERPL-MCNC: 51 MG/DL (ref 8–22)
BUN SERPL-MCNC: 51 MG/DL (ref 8–22)
BUN SERPL-MCNC: 54 MG/DL (ref 8–22)
BUN SERPL-MCNC: 54 MG/DL (ref 8–22)
BUN SERPL-MCNC: 68 MG/DL (ref 8–22)
CA-I SERPL-SCNC: 0.9 MMOL/L (ref 1.1–1.3)
CALCIUM SERPL-MCNC: 6.6 MG/DL (ref 8.5–10.5)
CALCIUM SERPL-MCNC: 6.9 MG/DL (ref 8.5–10.5)
CALCIUM SERPL-MCNC: 7 MG/DL (ref 8.5–10.5)
CALCIUM SERPL-MCNC: 7.1 MG/DL (ref 8.5–10.5)
CALCIUM SERPL-MCNC: 7.2 MG/DL (ref 8.5–10.5)
CALCIUM SERPL-MCNC: 7.3 MG/DL (ref 8.5–10.5)
CALCIUM SERPL-MCNC: 7.5 MG/DL (ref 8.5–10.5)
CALCIUM SERPL-MCNC: 7.6 MG/DL (ref 8.5–10.5)
CHLORIDE SERPL-SCNC: 101 MMOL/L (ref 96–112)
CHLORIDE SERPL-SCNC: 102 MMOL/L (ref 96–112)
CHLORIDE SERPL-SCNC: 103 MMOL/L (ref 96–112)
CHLORIDE SERPL-SCNC: 105 MMOL/L (ref 96–112)
CHLORIDE SERPL-SCNC: 106 MMOL/L (ref 96–112)
CHLORIDE SERPL-SCNC: 107 MMOL/L (ref 96–112)
CHLORIDE SERPL-SCNC: 107 MMOL/L (ref 96–112)
CHLORIDE SERPL-SCNC: 108 MMOL/L (ref 96–112)
CHLORIDE SERPL-SCNC: 109 MMOL/L (ref 96–112)
CHLORIDE SERPL-SCNC: 110 MMOL/L (ref 96–112)
CO2 BLDA-SCNC: 17 MMOL/L (ref 20–33)
CO2 BLDA-SCNC: 18 MMOL/L (ref 20–33)
CO2 BLDA-SCNC: 19 MMOL/L (ref 20–33)
CO2 BLDA-SCNC: 22 MMOL/L (ref 20–33)
CO2 BLDA-SCNC: 25 MMOL/L (ref 20–33)
CO2 BLDA-SCNC: 26 MMOL/L (ref 20–33)
CO2 BLDA-SCNC: 26 MMOL/L (ref 20–33)
CO2 BLDA-SCNC: 28 MMOL/L (ref 20–33)
CO2 SERPL-SCNC: 16 MMOL/L (ref 20–33)
CO2 SERPL-SCNC: 17 MMOL/L (ref 20–33)
CO2 SERPL-SCNC: 18 MMOL/L (ref 20–33)
CO2 SERPL-SCNC: 18 MMOL/L (ref 20–33)
CO2 SERPL-SCNC: 20 MMOL/L (ref 20–33)
CO2 SERPL-SCNC: 22 MMOL/L (ref 20–33)
CO2 SERPL-SCNC: 27 MMOL/L (ref 20–33)
CREAT SERPL-MCNC: 2.65 MG/DL (ref 0.5–1.4)
CREAT SERPL-MCNC: 2.81 MG/DL (ref 0.5–1.4)
CREAT SERPL-MCNC: 2.86 MG/DL (ref 0.5–1.4)
CREAT SERPL-MCNC: 2.96 MG/DL (ref 0.5–1.4)
CREAT SERPL-MCNC: 2.99 MG/DL (ref 0.5–1.4)
CREAT SERPL-MCNC: 3.11 MG/DL (ref 0.5–1.4)
CREAT SERPL-MCNC: 3.13 MG/DL (ref 0.5–1.4)
CREAT SERPL-MCNC: 3.45 MG/DL (ref 0.5–1.4)
CREAT SERPL-MCNC: 3.48 MG/DL (ref 0.5–1.4)
CREAT SERPL-MCNC: 3.61 MG/DL (ref 0.5–1.4)
CREAT SERPL-MCNC: 3.76 MG/DL (ref 0.5–1.4)
CREAT SERPL-MCNC: 3.78 MG/DL (ref 0.5–1.4)
CREAT SERPL-MCNC: 4 MG/DL (ref 0.5–1.4)
CREAT SERPL-MCNC: 4.01 MG/DL (ref 0.5–1.4)
CREAT SERPL-MCNC: 5.66 MG/DL (ref 0.5–1.4)
EKG IMPRESSION: NORMAL
EOSINOPHIL # BLD AUTO: 0.02 K/UL (ref 0–0.51)
EOSINOPHIL # BLD AUTO: 0.03 K/UL (ref 0–0.51)
EOSINOPHIL # BLD AUTO: 0.11 K/UL (ref 0–0.51)
EOSINOPHIL # BLD AUTO: 0.16 K/UL (ref 0–0.51)
EOSINOPHIL # BLD AUTO: 0.3 K/UL (ref 0–0.51)
EOSINOPHIL # BLD AUTO: 0.39 K/UL (ref 0–0.51)
EOSINOPHIL # BLD AUTO: 0.44 K/UL (ref 0–0.51)
EOSINOPHIL # BLD AUTO: 0.44 K/UL (ref 0–0.51)
EOSINOPHIL NFR BLD: 0.2 % (ref 0–6.9)
EOSINOPHIL NFR BLD: 0.2 % (ref 0–6.9)
EOSINOPHIL NFR BLD: 0.8 % (ref 0–6.9)
EOSINOPHIL NFR BLD: 1.6 % (ref 0–6.9)
EOSINOPHIL NFR BLD: 1.9 % (ref 0–6.9)
EOSINOPHIL NFR BLD: 2.6 % (ref 0–6.9)
EOSINOPHIL NFR BLD: 3 % (ref 0–6.9)
EOSINOPHIL NFR BLD: 5.9 % (ref 0–6.9)
ERYTHROCYTE [DISTWIDTH] IN BLOOD BY AUTOMATED COUNT: 48.1 FL (ref 35.9–50)
ERYTHROCYTE [DISTWIDTH] IN BLOOD BY AUTOMATED COUNT: 48.7 FL (ref 35.9–50)
ERYTHROCYTE [DISTWIDTH] IN BLOOD BY AUTOMATED COUNT: 50 FL (ref 35.9–50)
ERYTHROCYTE [DISTWIDTH] IN BLOOD BY AUTOMATED COUNT: 50 FL (ref 35.9–50)
ERYTHROCYTE [DISTWIDTH] IN BLOOD BY AUTOMATED COUNT: 51 FL (ref 35.9–50)
ERYTHROCYTE [DISTWIDTH] IN BLOOD BY AUTOMATED COUNT: 51.2 FL (ref 35.9–50)
ERYTHROCYTE [DISTWIDTH] IN BLOOD BY AUTOMATED COUNT: 51.2 FL (ref 35.9–50)
ERYTHROCYTE [DISTWIDTH] IN BLOOD BY AUTOMATED COUNT: 51.3 FL (ref 35.9–50)
ERYTHROCYTE [DISTWIDTH] IN BLOOD BY AUTOMATED COUNT: 51.3 FL (ref 35.9–50)
ERYTHROCYTE [DISTWIDTH] IN BLOOD BY AUTOMATED COUNT: 51.5 FL (ref 35.9–50)
ERYTHROCYTE [DISTWIDTH] IN BLOOD BY AUTOMATED COUNT: 51.7 FL (ref 35.9–50)
ERYTHROCYTE [DISTWIDTH] IN BLOOD BY AUTOMATED COUNT: 52.4 FL (ref 35.9–50)
ERYTHROCYTE [DISTWIDTH] IN BLOOD BY AUTOMATED COUNT: 52.8 FL (ref 35.9–50)
ERYTHROCYTE [DISTWIDTH] IN BLOOD BY AUTOMATED COUNT: 53 FL (ref 35.9–50)
ERYTHROCYTE [DISTWIDTH] IN BLOOD BY AUTOMATED COUNT: 53 FL (ref 35.9–50)
ERYTHROCYTE [DISTWIDTH] IN BLOOD BY AUTOMATED COUNT: 53.3 FL (ref 35.9–50)
EXPOSED MRN EXMRN: NORMAL
GLOBULIN SER CALC-MCNC: 2.4 G/DL (ref 1.9–3.5)
GLOBULIN SER CALC-MCNC: 2.7 G/DL (ref 1.9–3.5)
GLUCOSE BLD-MCNC: 100 MG/DL (ref 65–99)
GLUCOSE BLD-MCNC: 109 MG/DL (ref 65–99)
GLUCOSE BLD-MCNC: 117 MG/DL (ref 65–99)
GLUCOSE BLD-MCNC: 123 MG/DL (ref 65–99)
GLUCOSE BLD-MCNC: 144 MG/DL (ref 65–99)
GLUCOSE BLD-MCNC: 164 MG/DL (ref 65–99)
GLUCOSE BLD-MCNC: 166 MG/DL (ref 65–99)
GLUCOSE BLD-MCNC: 168 MG/DL (ref 65–99)
GLUCOSE BLD-MCNC: 169 MG/DL (ref 65–99)
GLUCOSE BLD-MCNC: 170 MG/DL (ref 65–99)
GLUCOSE BLD-MCNC: 173 MG/DL (ref 65–99)
GLUCOSE BLD-MCNC: 183 MG/DL (ref 65–99)
GLUCOSE BLD-MCNC: 192 MG/DL (ref 65–99)
GLUCOSE BLD-MCNC: 199 MG/DL (ref 65–99)
GLUCOSE BLD-MCNC: 204 MG/DL (ref 65–99)
GLUCOSE BLD-MCNC: 204 MG/DL (ref 65–99)
GLUCOSE BLD-MCNC: 207 MG/DL (ref 65–99)
GLUCOSE BLD-MCNC: 213 MG/DL (ref 65–99)
GLUCOSE BLD-MCNC: 215 MG/DL (ref 65–99)
GLUCOSE BLD-MCNC: 222 MG/DL (ref 65–99)
GLUCOSE BLD-MCNC: 225 MG/DL (ref 65–99)
GLUCOSE BLD-MCNC: 226 MG/DL (ref 65–99)
GLUCOSE BLD-MCNC: 230 MG/DL (ref 65–99)
GLUCOSE BLD-MCNC: 239 MG/DL (ref 65–99)
GLUCOSE BLD-MCNC: 239 MG/DL (ref 65–99)
GLUCOSE BLD-MCNC: 242 MG/DL (ref 65–99)
GLUCOSE BLD-MCNC: 244 MG/DL (ref 65–99)
GLUCOSE BLD-MCNC: 247 MG/DL (ref 65–99)
GLUCOSE BLD-MCNC: 248 MG/DL (ref 65–99)
GLUCOSE BLD-MCNC: 251 MG/DL (ref 65–99)
GLUCOSE BLD-MCNC: 253 MG/DL (ref 65–99)
GLUCOSE BLD-MCNC: 255 MG/DL (ref 65–99)
GLUCOSE BLD-MCNC: 255 MG/DL (ref 65–99)
GLUCOSE BLD-MCNC: 260 MG/DL (ref 65–99)
GLUCOSE BLD-MCNC: 260 MG/DL (ref 65–99)
GLUCOSE BLD-MCNC: 262 MG/DL (ref 65–99)
GLUCOSE BLD-MCNC: 263 MG/DL (ref 65–99)
GLUCOSE BLD-MCNC: 333 MG/DL (ref 65–99)
GLUCOSE BLD-MCNC: 60 MG/DL (ref 65–99)
GLUCOSE BLD-MCNC: 69 MG/DL (ref 65–99)
GLUCOSE BLD-MCNC: 73 MG/DL (ref 65–99)
GLUCOSE BLD-MCNC: 78 MG/DL (ref 65–99)
GLUCOSE BLD-MCNC: 79 MG/DL (ref 65–99)
GLUCOSE BLD-MCNC: 90 MG/DL (ref 65–99)
GLUCOSE BLD-MCNC: 93 MG/DL (ref 65–99)
GLUCOSE BLD-MCNC: 95 MG/DL (ref 65–99)
GLUCOSE BLD-MCNC: 97 MG/DL (ref 65–99)
GLUCOSE SERPL-MCNC: 111 MG/DL (ref 65–99)
GLUCOSE SERPL-MCNC: 113 MG/DL (ref 65–99)
GLUCOSE SERPL-MCNC: 183 MG/DL (ref 65–99)
GLUCOSE SERPL-MCNC: 201 MG/DL (ref 65–99)
GLUCOSE SERPL-MCNC: 214 MG/DL (ref 65–99)
GLUCOSE SERPL-MCNC: 218 MG/DL (ref 65–99)
GLUCOSE SERPL-MCNC: 232 MG/DL (ref 65–99)
GLUCOSE SERPL-MCNC: 247 MG/DL (ref 65–99)
GLUCOSE SERPL-MCNC: 267 MG/DL (ref 65–99)
GLUCOSE SERPL-MCNC: 274 MG/DL (ref 65–99)
GLUCOSE SERPL-MCNC: 286 MG/DL (ref 65–99)
GLUCOSE SERPL-MCNC: 291 MG/DL (ref 65–99)
GLUCOSE SERPL-MCNC: 298 MG/DL (ref 65–99)
GLUCOSE SERPL-MCNC: 72 MG/DL (ref 65–99)
GLUCOSE SERPL-MCNC: 91 MG/DL (ref 65–99)
HAV IGM SERPL QL IA: NEGATIVE
HBV CORE IGM SER QL: NEGATIVE
HBV SURFACE AB SERPL IA-ACNC: <3.1 MIU/ML (ref 0–10)
HBV SURFACE AG SER QL: NEGATIVE
HBV SURFACE AG SER QL: NEGATIVE
HCO3 BLDA-SCNC: 16.1 MMOL/L (ref 17–25)
HCO3 BLDA-SCNC: 16.9 MMOL/L (ref 17–25)
HCO3 BLDA-SCNC: 17.6 MMOL/L (ref 17–25)
HCO3 BLDA-SCNC: 20.5 MMOL/L (ref 17–25)
HCO3 BLDA-SCNC: 23.1 MMOL/L (ref 17–25)
HCO3 BLDA-SCNC: 24.2 MMOL/L (ref 17–25)
HCO3 BLDA-SCNC: 25.4 MMOL/L (ref 17–25)
HCO3 BLDA-SCNC: 27.5 MMOL/L (ref 17–25)
HCT VFR BLD AUTO: 23.9 % (ref 42–52)
HCT VFR BLD AUTO: 24.1 % (ref 42–52)
HCT VFR BLD AUTO: 29.1 % (ref 42–52)
HCT VFR BLD AUTO: 31.1 % (ref 42–52)
HCT VFR BLD AUTO: 33.2 % (ref 42–52)
HCT VFR BLD AUTO: 34.9 % (ref 42–52)
HCT VFR BLD AUTO: 35.8 % (ref 42–52)
HCT VFR BLD AUTO: 35.9 % (ref 42–52)
HCT VFR BLD AUTO: 36.5 % (ref 42–52)
HCT VFR BLD AUTO: 36.7 % (ref 42–52)
HCT VFR BLD AUTO: 36.9 % (ref 42–52)
HCT VFR BLD AUTO: 37.3 % (ref 42–52)
HCT VFR BLD AUTO: 37.8 % (ref 42–52)
HCT VFR BLD AUTO: 38 % (ref 42–52)
HCT VFR BLD AUTO: 38.1 % (ref 42–52)
HCT VFR BLD AUTO: 38.3 % (ref 42–52)
HCV AB SER QL: NEGATIVE
HCV AB SER QL: NEGATIVE
HGB BLD-MCNC: 10.3 G/DL (ref 14–18)
HGB BLD-MCNC: 10.8 G/DL (ref 14–18)
HGB BLD-MCNC: 11.1 G/DL (ref 14–18)
HGB BLD-MCNC: 11.1 G/DL (ref 14–18)
HGB BLD-MCNC: 11.2 G/DL (ref 14–18)
HGB BLD-MCNC: 11.3 G/DL (ref 14–18)
HGB BLD-MCNC: 11.4 G/DL (ref 14–18)
HGB BLD-MCNC: 11.4 G/DL (ref 14–18)
HGB BLD-MCNC: 11.7 G/DL (ref 14–18)
HGB BLD-MCNC: 11.7 G/DL (ref 14–18)
HGB BLD-MCNC: 7.5 G/DL (ref 14–18)
HGB BLD-MCNC: 7.5 G/DL (ref 14–18)
HGB BLD-MCNC: 9 G/DL (ref 14–18)
HGB BLD-MCNC: 9.5 G/DL (ref 14–18)
HIV 1+2 AB+HIV1 P24 AG SERPL QL IA: NON REACTIVE
IMM GRANULOCYTES # BLD AUTO: 0.09 K/UL (ref 0–0.11)
IMM GRANULOCYTES # BLD AUTO: 0.11 K/UL (ref 0–0.11)
IMM GRANULOCYTES # BLD AUTO: 0.17 K/UL (ref 0–0.11)
IMM GRANULOCYTES # BLD AUTO: 0.2 K/UL (ref 0–0.11)
IMM GRANULOCYTES # BLD AUTO: 0.2 K/UL (ref 0–0.11)
IMM GRANULOCYTES # BLD AUTO: 0.21 K/UL (ref 0–0.11)
IMM GRANULOCYTES # BLD AUTO: 0.22 K/UL (ref 0–0.11)
IMM GRANULOCYTES NFR BLD AUTO: 0.7 % (ref 0–0.9)
IMM GRANULOCYTES NFR BLD AUTO: 0.8 % (ref 0–0.9)
IMM GRANULOCYTES NFR BLD AUTO: 1.3 % (ref 0–0.9)
IMM GRANULOCYTES NFR BLD AUTO: 1.3 % (ref 0–0.9)
IMM GRANULOCYTES NFR BLD AUTO: 1.7 % (ref 0–0.9)
IMM GRANULOCYTES NFR BLD AUTO: 2 % (ref 0–0.9)
IMM GRANULOCYTES NFR BLD AUTO: 2.8 % (ref 0–0.9)
INR PPP: 1.26 (ref 0.87–1.13)
INR PPP: 1.65 (ref 0.87–1.13)
INR PPP: 2.15 (ref 0.87–1.13)
INR PPP: 2.41 (ref 0.87–1.13)
INR PPP: 2.49 (ref 0.87–1.13)
INR PPP: 2.55 (ref 0.87–1.13)
INR PPP: 3.08 (ref 0.87–1.13)
INR PPP: 3.48 (ref 0.87–1.13)
INR PPP: 3.72 (ref 0.87–1.13)
INR PPP: 4.12 (ref 0.87–1.13)
INR PPP: 4.2 (ref 0.87–1.13)
INR PPP: 4.43 (ref 0.87–1.13)
INR PPP: 4.68 (ref 0.87–1.13)
INR PPP: 4.75 (ref 0.87–1.13)
INR PPP: 4.75 (ref 0.87–1.13)
INST. QUALIFIED PATIENT IIQPT: YES
LACTATE BLD-SCNC: 4.3 MMOL/L (ref 0.5–2)
LV EJECT FRACT  99904: 15
LV EJECT FRACT MOD 2C 99903: 31.45
LV EJECT FRACT MOD 4C 99902: 19.06
LV EJECT FRACT MOD BP 99901: 28.56
LYMPHOCYTES # BLD AUTO: 0.34 K/UL (ref 1–4.8)
LYMPHOCYTES # BLD AUTO: 0.35 K/UL (ref 1–4.8)
LYMPHOCYTES # BLD AUTO: 0.41 K/UL (ref 1–4.8)
LYMPHOCYTES # BLD AUTO: 0.46 K/UL (ref 1–4.8)
LYMPHOCYTES # BLD AUTO: 0.75 K/UL (ref 1–4.8)
LYMPHOCYTES # BLD AUTO: 0.81 K/UL (ref 1–4.8)
LYMPHOCYTES # BLD AUTO: 0.82 K/UL (ref 1–4.8)
LYMPHOCYTES # BLD AUTO: 1.18 K/UL (ref 1–4.8)
LYMPHOCYTES NFR BLD: 2.6 % (ref 22–41)
LYMPHOCYTES NFR BLD: 3.1 % (ref 22–41)
LYMPHOCYTES NFR BLD: 3.5 % (ref 22–41)
LYMPHOCYTES NFR BLD: 4.7 % (ref 22–41)
LYMPHOCYTES NFR BLD: 5.6 % (ref 22–41)
LYMPHOCYTES NFR BLD: 6.1 % (ref 22–41)
LYMPHOCYTES NFR BLD: 6.4 % (ref 22–41)
LYMPHOCYTES NFR BLD: 7 % (ref 22–41)
MAGNESIUM SERPL-MCNC: 1.8 MG/DL (ref 1.5–2.5)
MAGNESIUM SERPL-MCNC: 2.2 MG/DL (ref 1.5–2.5)
MAGNESIUM SERPL-MCNC: 2.5 MG/DL (ref 1.5–2.5)
MAGNESIUM SERPL-MCNC: 2.8 MG/DL (ref 1.5–2.5)
MAGNESIUM SERPL-MCNC: 3 MG/DL (ref 1.5–2.5)
MAGNESIUM SERPL-MCNC: 3.4 MG/DL (ref 1.5–2.5)
MAGNESIUM SERPL-MCNC: 3.6 MG/DL (ref 1.5–2.5)
MAGNESIUM SERPL-MCNC: 3.7 MG/DL (ref 1.5–2.5)
MAGNESIUM SERPL-MCNC: 3.7 MG/DL (ref 1.5–2.5)
MAGNESIUM SERPL-MCNC: 3.8 MG/DL (ref 1.5–2.5)
MAGNESIUM SERPL-MCNC: 3.8 MG/DL (ref 1.5–2.5)
MAGNESIUM SERPL-MCNC: 3.9 MG/DL (ref 1.5–2.5)
MAGNESIUM SERPL-MCNC: 4 MG/DL (ref 1.5–2.5)
MAGNESIUM SERPL-MCNC: 4.8 MG/DL (ref 1.5–2.5)
MANUAL DIFF BLD: NORMAL
MCH RBC QN AUTO: 24.8 PG (ref 27–33)
MCH RBC QN AUTO: 25 PG (ref 27–33)
MCH RBC QN AUTO: 25.2 PG (ref 27–33)
MCH RBC QN AUTO: 25.3 PG (ref 27–33)
MCH RBC QN AUTO: 25.5 PG (ref 27–33)
MCH RBC QN AUTO: 25.6 PG (ref 27–33)
MCH RBC QN AUTO: 25.6 PG (ref 27–33)
MCH RBC QN AUTO: 25.7 PG (ref 27–33)
MCH RBC QN AUTO: 25.9 PG (ref 27–33)
MCH RBC QN AUTO: 25.9 PG (ref 27–33)
MCH RBC QN AUTO: 26 PG (ref 27–33)
MCHC RBC AUTO-ENTMCNC: 29.2 G/DL (ref 33.7–35.3)
MCHC RBC AUTO-ENTMCNC: 29.4 G/DL (ref 33.7–35.3)
MCHC RBC AUTO-ENTMCNC: 29.5 G/DL (ref 33.7–35.3)
MCHC RBC AUTO-ENTMCNC: 30.1 G/DL (ref 33.7–35.3)
MCHC RBC AUTO-ENTMCNC: 30.4 G/DL (ref 33.7–35.3)
MCHC RBC AUTO-ENTMCNC: 30.5 G/DL (ref 33.7–35.3)
MCHC RBC AUTO-ENTMCNC: 30.6 G/DL (ref 33.7–35.3)
MCHC RBC AUTO-ENTMCNC: 30.7 G/DL (ref 33.7–35.3)
MCHC RBC AUTO-ENTMCNC: 30.9 G/DL (ref 33.7–35.3)
MCHC RBC AUTO-ENTMCNC: 31 G/DL (ref 33.7–35.3)
MCHC RBC AUTO-ENTMCNC: 31.1 G/DL (ref 33.7–35.3)
MCHC RBC AUTO-ENTMCNC: 31.4 G/DL (ref 33.7–35.3)
MCHC RBC AUTO-ENTMCNC: 31.9 G/DL (ref 33.7–35.3)
MCHC RBC AUTO-ENTMCNC: 32.7 G/DL (ref 33.7–35.3)
MCV RBC AUTO: 79.1 FL (ref 81.4–97.8)
MCV RBC AUTO: 79.3 FL (ref 81.4–97.8)
MCV RBC AUTO: 81.7 FL (ref 81.4–97.8)
MCV RBC AUTO: 81.8 FL (ref 81.4–97.8)
MCV RBC AUTO: 82 FL (ref 81.4–97.8)
MCV RBC AUTO: 82.4 FL (ref 81.4–97.8)
MCV RBC AUTO: 82.7 FL (ref 81.4–97.8)
MCV RBC AUTO: 82.7 FL (ref 81.4–97.8)
MCV RBC AUTO: 82.9 FL (ref 81.4–97.8)
MCV RBC AUTO: 82.9 FL (ref 81.4–97.8)
MCV RBC AUTO: 83.1 FL (ref 81.4–97.8)
MCV RBC AUTO: 83.1 FL (ref 81.4–97.8)
MCV RBC AUTO: 83.6 FL (ref 81.4–97.8)
MCV RBC AUTO: 84.8 FL (ref 81.4–97.8)
MCV RBC AUTO: 85 FL (ref 81.4–97.8)
MCV RBC AUTO: 86.7 FL (ref 81.4–97.8)
MICROCYTES BLD QL SMEAR: ABNORMAL
MONOCYTES # BLD AUTO: 0.72 K/UL (ref 0–0.85)
MONOCYTES # BLD AUTO: 0.84 K/UL (ref 0–0.85)
MONOCYTES # BLD AUTO: 0.89 K/UL (ref 0–0.85)
MONOCYTES # BLD AUTO: 0.89 K/UL (ref 0–0.85)
MONOCYTES # BLD AUTO: 1.05 K/UL (ref 0–0.85)
MONOCYTES # BLD AUTO: 1.15 K/UL (ref 0–0.85)
MONOCYTES # BLD AUTO: 1.3 K/UL (ref 0–0.85)
MONOCYTES # BLD AUTO: 1.4 K/UL (ref 0–0.85)
MONOCYTES NFR BLD AUTO: 11.2 % (ref 0–13.4)
MONOCYTES NFR BLD AUTO: 4.3 % (ref 0–13.4)
MONOCYTES NFR BLD AUTO: 6.7 % (ref 0–13.4)
MONOCYTES NFR BLD AUTO: 8.2 % (ref 0–13.4)
MONOCYTES NFR BLD AUTO: 8.7 % (ref 0–13.4)
MONOCYTES NFR BLD AUTO: 8.8 % (ref 0–13.4)
MONOCYTES NFR BLD AUTO: 9.1 % (ref 0–13.4)
MONOCYTES NFR BLD AUTO: 9.1 % (ref 0–13.4)
MORPHOLOGY BLD-IMP: NORMAL
NEUTROPHILS # BLD AUTO: 10.41 K/UL (ref 1.82–7.42)
NEUTROPHILS # BLD AUTO: 11.45 K/UL (ref 1.82–7.42)
NEUTROPHILS # BLD AUTO: 11.81 K/UL (ref 1.82–7.42)
NEUTROPHILS # BLD AUTO: 11.98 K/UL (ref 1.82–7.42)
NEUTROPHILS # BLD AUTO: 13.22 K/UL (ref 1.82–7.42)
NEUTROPHILS # BLD AUTO: 14.46 K/UL (ref 1.82–7.42)
NEUTROPHILS # BLD AUTO: 5.51 K/UL (ref 1.82–7.42)
NEUTROPHILS # BLD AUTO: 8.16 K/UL (ref 1.82–7.42)
NEUTROPHILS NFR BLD: 73.7 % (ref 44–72)
NEUTROPHILS NFR BLD: 81.2 % (ref 44–72)
NEUTROPHILS NFR BLD: 82.9 % (ref 44–72)
NEUTROPHILS NFR BLD: 83.4 % (ref 44–72)
NEUTROPHILS NFR BLD: 83.5 % (ref 44–72)
NEUTROPHILS NFR BLD: 83.6 % (ref 44–72)
NEUTROPHILS NFR BLD: 86.6 % (ref 44–72)
NEUTROPHILS NFR BLD: 88.5 % (ref 44–72)
NEUTS BAND NFR BLD MANUAL: 2.6 % (ref 0–10)
NRBC # BLD AUTO: 0 K/UL
NRBC # BLD AUTO: 0.02 K/UL
NRBC # BLD AUTO: 0.03 K/UL
NRBC # BLD AUTO: 0.04 K/UL
NRBC # BLD AUTO: 0.06 K/UL
NRBC # BLD AUTO: 0.06 K/UL
NRBC # BLD AUTO: 0.08 K/UL
NRBC # BLD AUTO: 0.09 K/UL
NRBC BLD-RTO: 0 /100 WBC
NRBC BLD-RTO: 0.1 /100 WBC
NRBC BLD-RTO: 0.2 /100 WBC
NRBC BLD-RTO: 0.3 /100 WBC
NRBC BLD-RTO: 0.5 /100 WBC
NRBC BLD-RTO: 0.6 /100 WBC
NRBC BLD-RTO: 0.7 /100 WBC
NRBC BLD-RTO: 1.1 /100 WBC
O2/TOTAL GAS SETTING VFR VENT: 100 %
O2/TOTAL GAS SETTING VFR VENT: 100 %
O2/TOTAL GAS SETTING VFR VENT: 30 %
O2/TOTAL GAS SETTING VFR VENT: 35 %
O2/TOTAL GAS SETTING VFR VENT: 40 %
O2/TOTAL GAS SETTING VFR VENT: 70 %
OVALOCYTES BLD QL SMEAR: NORMAL
PCO2 BLDA: 22.9 MMHG (ref 26–37)
PCO2 BLDA: 27.8 MMHG (ref 26–37)
PCO2 BLDA: 32.5 MMHG (ref 26–37)
PCO2 BLDA: 33 MMHG (ref 26–37)
PCO2 BLDA: 33.3 MMHG (ref 26–37)
PCO2 BLDA: 33.8 MMHG (ref 26–37)
PCO2 BLDA: 44.4 MMHG (ref 26–37)
PCO2 BLDA: 53.9 MMHG (ref 26–37)
PCO2 TEMP ADJ BLDA: 21.7 MMHG (ref 26–37)
PCO2 TEMP ADJ BLDA: 26.3 MMHG (ref 26–37)
PCO2 TEMP ADJ BLDA: 31.2 MMHG (ref 26–37)
PCO2 TEMP ADJ BLDA: 31.5 MMHG (ref 26–37)
PCO2 TEMP ADJ BLDA: 34.1 MMHG (ref 26–37)
PCO2 TEMP ADJ BLDA: 34.7 MMHG (ref 26–37)
PCO2 TEMP ADJ BLDA: 42.1 MMHG (ref 26–37)
PH BLDA: 7.24 [PH] (ref 7.4–7.5)
PH BLDA: 7.34 [PH] (ref 7.4–7.5)
PH BLDA: 7.34 [PH] (ref 7.4–7.5)
PH BLDA: 7.37 [PH] (ref 7.4–7.5)
PH BLDA: 7.41 [PH] (ref 7.4–7.5)
PH BLDA: 7.48 [PH] (ref 7.4–7.5)
PH BLDA: 7.48 [PH] (ref 7.4–7.5)
PH BLDA: 7.52 [PH] (ref 7.4–7.5)
PH TEMP ADJ BLDA: 7.35 [PH] (ref 7.4–7.5)
PH TEMP ADJ BLDA: 7.36 [PH] (ref 7.4–7.5)
PH TEMP ADJ BLDA: 7.39 [PH] (ref 7.4–7.5)
PH TEMP ADJ BLDA: 7.42 [PH] (ref 7.4–7.5)
PH TEMP ADJ BLDA: 7.47 [PH] (ref 7.4–7.5)
PH TEMP ADJ BLDA: 7.49 [PH] (ref 7.4–7.5)
PH TEMP ADJ BLDA: 7.52 [PH] (ref 7.4–7.5)
PHOSPHATE SERPL-MCNC: 3.3 MG/DL (ref 2.5–4.5)
PHOSPHATE SERPL-MCNC: 3.7 MG/DL (ref 2.5–4.5)
PHOSPHATE SERPL-MCNC: 3.9 MG/DL (ref 2.5–4.5)
PHOSPHATE SERPL-MCNC: 4.7 MG/DL (ref 2.5–4.5)
PHOSPHATE SERPL-MCNC: 5.3 MG/DL (ref 2.5–4.5)
PHOSPHATE SERPL-MCNC: 6.6 MG/DL (ref 2.5–4.5)
PHOSPHATE SERPL-MCNC: 7 MG/DL (ref 2.5–4.5)
PHOSPHATE SERPL-MCNC: 7.1 MG/DL (ref 2.5–4.5)
PHOSPHATE SERPL-MCNC: 8.3 MG/DL (ref 2.5–4.5)
PLATELET # BLD AUTO: 120 K/UL (ref 164–446)
PLATELET # BLD AUTO: 122 K/UL (ref 164–446)
PLATELET # BLD AUTO: 123 K/UL (ref 164–446)
PLATELET # BLD AUTO: 127 K/UL (ref 164–446)
PLATELET # BLD AUTO: 153 K/UL (ref 164–446)
PLATELET # BLD AUTO: 161 K/UL (ref 164–446)
PLATELET # BLD AUTO: 162 K/UL (ref 164–446)
PLATELET # BLD AUTO: 167 K/UL (ref 164–446)
PLATELET # BLD AUTO: 176 K/UL (ref 164–446)
PLATELET # BLD AUTO: 177 K/UL (ref 164–446)
PLATELET # BLD AUTO: 180 K/UL (ref 164–446)
PLATELET # BLD AUTO: 183 K/UL (ref 164–446)
PLATELET # BLD AUTO: 189 K/UL (ref 164–446)
PLATELET # BLD AUTO: 195 K/UL (ref 164–446)
PLATELET # BLD AUTO: 205 K/UL (ref 164–446)
PLATELET # BLD AUTO: 94 K/UL (ref 164–446)
PLATELET BLD QL SMEAR: NORMAL
PMV BLD AUTO: 10.2 FL (ref 9–12.9)
PMV BLD AUTO: 10.3 FL (ref 9–12.9)
PMV BLD AUTO: 10.6 FL (ref 9–12.9)
PMV BLD AUTO: 10.7 FL (ref 9–12.9)
PMV BLD AUTO: 10.9 FL (ref 9–12.9)
PMV BLD AUTO: 11 FL (ref 9–12.9)
PMV BLD AUTO: 11.2 FL (ref 9–12.9)
PMV BLD AUTO: 11.9 FL (ref 9–12.9)
PMV BLD AUTO: 9.7 FL (ref 9–12.9)
PMV BLD AUTO: 9.9 FL (ref 9–12.9)
PO2 BLDA: 122 MMHG (ref 64–87)
PO2 BLDA: 180 MMHG (ref 64–87)
PO2 BLDA: 69 MMHG (ref 64–87)
PO2 BLDA: 71 MMHG (ref 64–87)
PO2 BLDA: 74 MMHG (ref 64–87)
PO2 BLDA: 74 MMHG (ref 64–87)
PO2 BLDA: 80 MMHG (ref 64–87)
PO2 BLDA: 91 MMHG (ref 64–87)
PO2 TEMP ADJ BLDA: 125 MMHG (ref 64–87)
PO2 TEMP ADJ BLDA: 174 MMHG (ref 64–87)
PO2 TEMP ADJ BLDA: 68 MMHG (ref 64–87)
PO2 TEMP ADJ BLDA: 69 MMHG (ref 64–87)
PO2 TEMP ADJ BLDA: 73 MMHG (ref 64–87)
PO2 TEMP ADJ BLDA: 75 MMHG (ref 64–87)
PO2 TEMP ADJ BLDA: 85 MMHG (ref 64–87)
POIKILOCYTOSIS BLD QL SMEAR: NORMAL
POTASSIUM SERPL-SCNC: 3.6 MMOL/L (ref 3.6–5.5)
POTASSIUM SERPL-SCNC: 3.6 MMOL/L (ref 3.6–5.5)
POTASSIUM SERPL-SCNC: 3.8 MMOL/L (ref 3.6–5.5)
POTASSIUM SERPL-SCNC: 4 MMOL/L (ref 3.6–5.5)
POTASSIUM SERPL-SCNC: 4.1 MMOL/L (ref 3.6–5.5)
POTASSIUM SERPL-SCNC: 4.2 MMOL/L (ref 3.6–5.5)
POTASSIUM SERPL-SCNC: 4.2 MMOL/L (ref 3.6–5.5)
POTASSIUM SERPL-SCNC: 4.3 MMOL/L (ref 3.6–5.5)
POTASSIUM SERPL-SCNC: 4.4 MMOL/L (ref 3.6–5.5)
POTASSIUM SERPL-SCNC: 4.4 MMOL/L (ref 3.6–5.5)
POTASSIUM SERPL-SCNC: 4.5 MMOL/L (ref 3.6–5.5)
POTASSIUM SERPL-SCNC: 4.6 MMOL/L (ref 3.6–5.5)
POTASSIUM SERPL-SCNC: 4.9 MMOL/L (ref 3.6–5.5)
PROT SERPL-MCNC: 4.1 G/DL (ref 6–8.2)
PROT SERPL-MCNC: 4.4 G/DL (ref 6–8.2)
PROTHROMBIN TIME: 15.5 SEC (ref 12–14.6)
PROTHROMBIN TIME: 19.2 SEC (ref 12–14.6)
PROTHROMBIN TIME: 23.7 SEC (ref 12–14.6)
PROTHROMBIN TIME: 25.9 SEC (ref 12–14.6)
PROTHROMBIN TIME: 26.6 SEC (ref 12–14.6)
PROTHROMBIN TIME: 27.1 SEC (ref 12–14.6)
PROTHROMBIN TIME: 31.5 SEC (ref 12–14.6)
PROTHROMBIN TIME: 34.7 SEC (ref 12–14.6)
PROTHROMBIN TIME: 36.6 SEC (ref 12–14.6)
PROTHROMBIN TIME: 39.7 SEC (ref 12–14.6)
PROTHROMBIN TIME: 40.3 SEC (ref 12–14.6)
PROTHROMBIN TIME: 42 SEC (ref 12–14.6)
PROTHROMBIN TIME: 43.9 SEC (ref 12–14.6)
PROTHROMBIN TIME: 44.4 SEC (ref 12–14.6)
PROTHROMBIN TIME: 44.4 SEC (ref 12–14.6)
RBC # BLD AUTO: 2.89 M/UL (ref 4.7–6.1)
RBC # BLD AUTO: 2.94 M/UL (ref 4.7–6.1)
RBC # BLD AUTO: 3.52 M/UL (ref 4.7–6.1)
RBC # BLD AUTO: 3.75 M/UL (ref 4.7–6.1)
RBC # BLD AUTO: 3.97 M/UL (ref 4.7–6.1)
RBC # BLD AUTO: 4.32 M/UL (ref 4.7–6.1)
RBC # BLD AUTO: 4.38 M/UL (ref 4.7–6.1)
RBC # BLD AUTO: 4.41 M/UL (ref 4.7–6.1)
RBC # BLD AUTO: 4.42 M/UL (ref 4.7–6.1)
RBC # BLD AUTO: 4.43 M/UL (ref 4.7–6.1)
RBC # BLD AUTO: 4.45 M/UL (ref 4.7–6.1)
RBC # BLD AUTO: 4.48 M/UL (ref 4.7–6.1)
RBC # BLD AUTO: 4.48 M/UL (ref 4.7–6.1)
RBC # BLD AUTO: 4.55 M/UL (ref 4.7–6.1)
RBC # BLD AUTO: 4.56 M/UL (ref 4.7–6.1)
RBC # BLD AUTO: 4.63 M/UL (ref 4.7–6.1)
RBC BLD AUTO: PRESENT
SAO2 % BLDA: 100 % (ref 93–99)
SAO2 % BLDA: 90 % (ref 93–99)
SAO2 % BLDA: 95 % (ref 93–99)
SAO2 % BLDA: 96 % (ref 93–99)
SAO2 % BLDA: 97 % (ref 93–99)
SAO2 % BLDA: 99 % (ref 93–99)
SODIUM SERPL-SCNC: 134 MMOL/L (ref 135–145)
SODIUM SERPL-SCNC: 136 MMOL/L (ref 135–145)
SODIUM SERPL-SCNC: 137 MMOL/L (ref 135–145)
SODIUM SERPL-SCNC: 138 MMOL/L (ref 135–145)
SODIUM SERPL-SCNC: 138 MMOL/L (ref 135–145)
SODIUM SERPL-SCNC: 139 MMOL/L (ref 135–145)
SODIUM SERPL-SCNC: 141 MMOL/L (ref 135–145)
SODIUM SERPL-SCNC: 142 MMOL/L (ref 135–145)
SODIUM SERPL-SCNC: 142 MMOL/L (ref 135–145)
SPECIMEN DRAWN FROM PATIENT: ABNORMAL
SPECIMEN DRAWN FROM PATIENT: NORMAL
TRIGL SERPL-MCNC: 132 MG/DL (ref 0–149)
TRIGL SERPL-MCNC: 161 MG/DL (ref 0–149)
TROPONIN I SERPL-MCNC: 0.24 NG/ML (ref 0–0.04)
TROPONIN I SERPL-MCNC: 3.29 NG/ML (ref 0–0.04)
WBC # BLD AUTO: 12.8 K/UL (ref 4.8–10.8)
WBC # BLD AUTO: 12.9 K/UL (ref 4.8–10.8)
WBC # BLD AUTO: 13.1 K/UL (ref 4.8–10.8)
WBC # BLD AUTO: 13.2 K/UL (ref 4.8–10.8)
WBC # BLD AUTO: 13.3 K/UL (ref 4.8–10.8)
WBC # BLD AUTO: 14.3 K/UL (ref 4.8–10.8)
WBC # BLD AUTO: 14.4 K/UL (ref 4.8–10.8)
WBC # BLD AUTO: 14.6 K/UL (ref 4.8–10.8)
WBC # BLD AUTO: 15.3 K/UL (ref 4.8–10.8)
WBC # BLD AUTO: 15.9 K/UL (ref 4.8–10.8)
WBC # BLD AUTO: 16 K/UL (ref 4.8–10.8)
WBC # BLD AUTO: 16.1 K/UL (ref 4.8–10.8)
WBC # BLD AUTO: 16.8 K/UL (ref 4.8–10.8)
WBC # BLD AUTO: 16.9 K/UL (ref 4.8–10.8)
WBC # BLD AUTO: 7.5 K/UL (ref 4.8–10.8)
WBC # BLD AUTO: 9.8 K/UL (ref 4.8–10.8)

## 2018-01-01 PROCEDURE — 700105 HCHG RX REV CODE 258: Performed by: INTERNAL MEDICINE

## 2018-01-01 PROCEDURE — A9270 NON-COVERED ITEM OR SERVICE: HCPCS | Performed by: HOSPITALIST

## 2018-01-01 PROCEDURE — 83735 ASSAY OF MAGNESIUM: CPT | Mod: 91

## 2018-01-01 PROCEDURE — 85025 COMPLETE CBC W/AUTO DIFF WBC: CPT

## 2018-01-01 PROCEDURE — 70450 CT HEAD/BRAIN W/O DYE: CPT

## 2018-01-01 PROCEDURE — 82803 BLOOD GASES ANY COMBINATION: CPT

## 2018-01-01 PROCEDURE — 700102 HCHG RX REV CODE 250 W/ 637 OVERRIDE(OP): Performed by: HOSPITALIST

## 2018-01-01 PROCEDURE — 94640 AIRWAY INHALATION TREATMENT: CPT

## 2018-01-01 PROCEDURE — 37799 UNLISTED PX VASCULAR SURGERY: CPT

## 2018-01-01 PROCEDURE — 700111 HCHG RX REV CODE 636 W/ 250 OVERRIDE (IP): Performed by: INTERNAL MEDICINE

## 2018-01-01 PROCEDURE — 02HV33Z INSERTION OF INFUSION DEVICE INTO SUPERIOR VENA CAVA, PERCUTANEOUS APPROACH: ICD-10-PCS | Performed by: INTERNAL MEDICINE

## 2018-01-01 PROCEDURE — 96366 THER/PROPH/DIAG IV INF ADDON: CPT

## 2018-01-01 PROCEDURE — 90935 HEMODIALYSIS ONE EVALUATION: CPT

## 2018-01-01 PROCEDURE — 94003 VENT MGMT INPAT SUBQ DAY: CPT

## 2018-01-01 PROCEDURE — 99233 SBSQ HOSP IP/OBS HIGH 50: CPT | Performed by: HOSPITALIST

## 2018-01-01 PROCEDURE — 83735 ASSAY OF MAGNESIUM: CPT

## 2018-01-01 PROCEDURE — 92950 HEART/LUNG RESUSCITATION CPR: CPT

## 2018-01-01 PROCEDURE — 700101 HCHG RX REV CODE 250: Performed by: INTERNAL MEDICINE

## 2018-01-01 PROCEDURE — C1751 CATH, INF, PER/CENT/MIDLINE: HCPCS | Performed by: EMERGENCY MEDICINE

## 2018-01-01 PROCEDURE — 85027 COMPLETE CBC AUTOMATED: CPT

## 2018-01-01 PROCEDURE — 770022 HCHG ROOM/CARE - ICU (200)

## 2018-01-01 PROCEDURE — 700111 HCHG RX REV CODE 636 W/ 250 OVERRIDE (IP): Performed by: HOSPITALIST

## 2018-01-01 PROCEDURE — 700102 HCHG RX REV CODE 250 W/ 637 OVERRIDE(OP): Performed by: INTERNAL MEDICINE

## 2018-01-01 PROCEDURE — 80053 COMPREHEN METABOLIC PANEL: CPT

## 2018-01-01 PROCEDURE — 84484 ASSAY OF TROPONIN QUANT: CPT

## 2018-01-01 PROCEDURE — 36556 INSERT NON-TUNNEL CV CATH: CPT

## 2018-01-01 PROCEDURE — 06HM33Z INSERTION OF INFUSION DEVICE INTO RIGHT FEMORAL VEIN, PERCUTANEOUS APPROACH: ICD-10-PCS | Performed by: EMERGENCY MEDICINE

## 2018-01-01 PROCEDURE — 93005 ELECTROCARDIOGRAM TRACING: CPT | Performed by: EMERGENCY MEDICINE

## 2018-01-01 PROCEDURE — 84100 ASSAY OF PHOSPHORUS: CPT

## 2018-01-01 PROCEDURE — 99291 CRITICAL CARE FIRST HOUR: CPT | Performed by: INTERNAL MEDICINE

## 2018-01-01 PROCEDURE — 700101 HCHG RX REV CODE 250: Performed by: HOSPITALIST

## 2018-01-01 PROCEDURE — A9270 NON-COVERED ITEM OR SERVICE: HCPCS | Performed by: INTERNAL MEDICINE

## 2018-01-01 PROCEDURE — 96365 THER/PROPH/DIAG IV INF INIT: CPT

## 2018-01-01 PROCEDURE — 51702 INSERT TEMP BLADDER CATH: CPT

## 2018-01-01 PROCEDURE — 4A10X4Z MONITORING OF CENTRAL NERVOUS ELECTRICAL ACTIVITY, EXTERNAL APPROACH: ICD-10-PCS | Performed by: PSYCHIATRY & NEUROLOGY

## 2018-01-01 PROCEDURE — 85730 THROMBOPLASTIN TIME PARTIAL: CPT | Mod: 91

## 2018-01-01 PROCEDURE — 80048 BASIC METABOLIC PNL TOTAL CA: CPT

## 2018-01-01 PROCEDURE — P9047 ALBUMIN (HUMAN), 25%, 50ML: HCPCS | Mod: JG | Performed by: INTERNAL MEDICINE

## 2018-01-01 PROCEDURE — 304561 HCHG STAT O2

## 2018-01-01 PROCEDURE — 85610 PROTHROMBIN TIME: CPT | Mod: 91

## 2018-01-01 PROCEDURE — 82962 GLUCOSE BLOOD TEST: CPT | Mod: 91

## 2018-01-01 PROCEDURE — 71045 X-RAY EXAM CHEST 1 VIEW: CPT

## 2018-01-01 PROCEDURE — 85730 THROMBOPLASTIN TIME PARTIAL: CPT

## 2018-01-01 PROCEDURE — 90935 HEMODIALYSIS ONE EVALUATION: CPT | Performed by: INTERNAL MEDICINE

## 2018-01-01 PROCEDURE — 93005 ELECTROCARDIOGRAM TRACING: CPT | Performed by: INTERNAL MEDICINE

## 2018-01-01 PROCEDURE — 85610 PROTHROMBIN TIME: CPT

## 2018-01-01 PROCEDURE — 82040 ASSAY OF SERUM ALBUMIN: CPT

## 2018-01-01 PROCEDURE — 84478 ASSAY OF TRIGLYCERIDES: CPT

## 2018-01-01 PROCEDURE — 86706 HEP B SURFACE ANTIBODY: CPT

## 2018-01-01 PROCEDURE — 04HK33Z INSERTION OF INFUSION DEVICE INTO RIGHT FEMORAL ARTERY, PERCUTANEOUS APPROACH: ICD-10-PCS | Performed by: INTERNAL MEDICINE

## 2018-01-01 PROCEDURE — 94002 VENT MGMT INPAT INIT DAY: CPT

## 2018-01-01 PROCEDURE — 700105 HCHG RX REV CODE 258: Performed by: HOSPITALIST

## 2018-01-01 PROCEDURE — 700111 HCHG RX REV CODE 636 W/ 250 OVERRIDE (IP): Performed by: EMERGENCY MEDICINE

## 2018-01-01 PROCEDURE — 96368 THER/DIAG CONCURRENT INF: CPT

## 2018-01-01 PROCEDURE — 36600 WITHDRAWAL OF ARTERIAL BLOOD: CPT

## 2018-01-01 PROCEDURE — 85007 BL SMEAR W/DIFF WBC COUNT: CPT

## 2018-01-01 PROCEDURE — 700101 HCHG RX REV CODE 250

## 2018-01-01 PROCEDURE — 36556 INSERT NON-TUNNEL CV CATH: CPT | Mod: LT | Performed by: INTERNAL MEDICINE

## 2018-01-01 PROCEDURE — 5A1D70Z PERFORMANCE OF URINARY FILTRATION, INTERMITTENT, LESS THAN 6 HOURS PER DAY: ICD-10-PCS | Performed by: INTERNAL MEDICINE

## 2018-01-01 PROCEDURE — 51798 US URINE CAPACITY MEASURE: CPT

## 2018-01-01 PROCEDURE — 99223 1ST HOSP IP/OBS HIGH 75: CPT | Performed by: HOSPITALIST

## 2018-01-01 PROCEDURE — 0BH18EZ INSERTION OF ENDOTRACHEAL AIRWAY INTO TRACHEA, VIA NATURAL OR ARTIFICIAL OPENING ENDOSCOPIC: ICD-10-PCS | Performed by: EMERGENCY MEDICINE

## 2018-01-01 PROCEDURE — 93010 ELECTROCARDIOGRAM REPORT: CPT | Performed by: INTERNAL MEDICINE

## 2018-01-01 PROCEDURE — 82330 ASSAY OF CALCIUM: CPT

## 2018-01-01 PROCEDURE — 306802 CATHETER,INSTAFLOW BOWEL: Performed by: INTERNAL MEDICINE

## 2018-01-01 PROCEDURE — 83605 ASSAY OF LACTIC ACID: CPT

## 2018-01-01 PROCEDURE — 302214 INTUBATION BOX

## 2018-01-01 PROCEDURE — C1751 CATH, INF, PER/CENT/MIDLINE: HCPCS

## 2018-01-01 PROCEDURE — 85027 COMPLETE CBC AUTOMATED: CPT | Mod: 91

## 2018-01-01 PROCEDURE — 94150 VITAL CAPACITY TEST: CPT

## 2018-01-01 PROCEDURE — 700101 HCHG RX REV CODE 250: Performed by: EMERGENCY MEDICINE

## 2018-01-01 PROCEDURE — 700105 HCHG RX REV CODE 258: Performed by: EMERGENCY MEDICINE

## 2018-01-01 PROCEDURE — 80048 BASIC METABOLIC PNL TOTAL CA: CPT | Mod: 91

## 2018-01-01 PROCEDURE — 95951 EEG: CPT | Mod: 52

## 2018-01-01 PROCEDURE — 302136 NUTRITION PUMP: Performed by: INTERNAL MEDICINE

## 2018-01-01 PROCEDURE — 700105 HCHG RX REV CODE 258

## 2018-01-01 PROCEDURE — 304538 HCHG NG TUBE

## 2018-01-01 PROCEDURE — 96375 TX/PRO/DX INJ NEW DRUG ADDON: CPT

## 2018-01-01 PROCEDURE — 82962 GLUCOSE BLOOD TEST: CPT

## 2018-01-01 PROCEDURE — 5A1955Z RESPIRATORY VENTILATION, GREATER THAN 96 CONSECUTIVE HOURS: ICD-10-PCS | Performed by: EMERGENCY MEDICINE

## 2018-01-01 PROCEDURE — 99233 SBSQ HOSP IP/OBS HIGH 50: CPT | Performed by: INTERNAL MEDICINE

## 2018-01-01 PROCEDURE — 36620 INSERTION CATHETER ARTERY: CPT | Performed by: INTERNAL MEDICINE

## 2018-01-01 PROCEDURE — 84100 ASSAY OF PHOSPHORUS: CPT | Mod: 91

## 2018-01-01 PROCEDURE — 93306 TTE W/DOPPLER COMPLETE: CPT

## 2018-01-01 PROCEDURE — 31500 INSERT EMERGENCY AIRWAY: CPT

## 2018-01-01 PROCEDURE — 700111 HCHG RX REV CODE 636 W/ 250 OVERRIDE (IP)

## 2018-01-01 PROCEDURE — 99291 CRITICAL CARE FIRST HOUR: CPT

## 2018-01-01 PROCEDURE — B548ZZA ULTRASONOGRAPHY OF SUPERIOR VENA CAVA, GUIDANCE: ICD-10-PCS | Performed by: INTERNAL MEDICINE

## 2018-01-01 PROCEDURE — 5A12012 PERFORMANCE OF CARDIAC OUTPUT, SINGLE, MANUAL: ICD-10-PCS | Performed by: EMERGENCY MEDICINE

## 2018-01-01 PROCEDURE — 36556 INSERT NON-TUNNEL CV CATH: CPT | Performed by: INTERNAL MEDICINE

## 2018-01-01 PROCEDURE — 93308 TTE F-UP OR LMTD: CPT | Mod: 26 | Performed by: INTERNAL MEDICINE

## 2018-01-01 PROCEDURE — 83880 ASSAY OF NATRIURETIC PEPTIDE: CPT

## 2018-01-01 PROCEDURE — 99291 CRITICAL CARE FIRST HOUR: CPT | Mod: 25 | Performed by: INTERNAL MEDICINE

## 2018-01-01 PROCEDURE — 700111 HCHG RX REV CODE 636 W/ 250 OVERRIDE (IP): Mod: JG | Performed by: INTERNAL MEDICINE

## 2018-01-01 PROCEDURE — 80074 ACUTE HEPATITIS PANEL: CPT

## 2018-01-01 PROCEDURE — 303105 HCHG CATHETER EXTRA

## 2018-01-01 PROCEDURE — 99292 CRITICAL CARE ADDL 30 MIN: CPT | Mod: 25 | Performed by: INTERNAL MEDICINE

## 2018-01-01 PROCEDURE — 99221 1ST HOSP IP/OBS SF/LOW 40: CPT | Performed by: INTERNAL MEDICINE

## 2018-01-01 RX ORDER — IPRATROPIUM BROMIDE AND ALBUTEROL SULFATE 2.5; .5 MG/3ML; MG/3ML
3 SOLUTION RESPIRATORY (INHALATION)
Status: DISCONTINUED | OUTPATIENT
Start: 2018-01-01 | End: 2018-01-01

## 2018-01-01 RX ORDER — VECURONIUM BROMIDE 1 MG/ML
0.1 INJECTION, POWDER, LYOPHILIZED, FOR SOLUTION INTRAVENOUS ONCE
Status: DISCONTINUED | OUTPATIENT
Start: 2018-01-01 | End: 2018-01-01

## 2018-01-01 RX ORDER — SODIUM CHLORIDE 9 MG/ML
INJECTION, SOLUTION INTRAVENOUS
Status: COMPLETED
Start: 2018-01-01 | End: 2018-01-01

## 2018-01-01 RX ORDER — AMOXICILLIN 250 MG
2 CAPSULE ORAL 2 TIMES DAILY
Status: DISCONTINUED | OUTPATIENT
Start: 2018-01-01 | End: 2018-01-01

## 2018-01-01 RX ORDER — TIZANIDINE 4 MG/1
4 TABLET ORAL
COMMUNITY

## 2018-01-01 RX ORDER — DEXTROSE MONOHYDRATE 25 G/50ML
25 INJECTION, SOLUTION INTRAVENOUS
Status: DISCONTINUED | OUTPATIENT
Start: 2018-01-01 | End: 2018-01-01

## 2018-01-01 RX ORDER — ATORVASTATIN CALCIUM 20 MG/1
80 TABLET, FILM COATED ORAL DAILY
Status: DISCONTINUED | OUTPATIENT
Start: 2018-01-01 | End: 2018-01-01

## 2018-01-01 RX ORDER — FUROSEMIDE 10 MG/ML
40 INJECTION INTRAMUSCULAR; INTRAVENOUS EVERY 12 HOURS
Status: DISCONTINUED | OUTPATIENT
Start: 2018-01-01 | End: 2018-01-01

## 2018-01-01 RX ORDER — HEPARIN SODIUM 1000 [USP'U]/ML
3800 INJECTION, SOLUTION INTRAVENOUS; SUBCUTANEOUS PRN
Status: DISCONTINUED | OUTPATIENT
Start: 2018-01-01 | End: 2018-01-01

## 2018-01-01 RX ORDER — FUROSEMIDE 10 MG/ML
20 INJECTION INTRAMUSCULAR; INTRAVENOUS EVERY 12 HOURS
Status: DISCONTINUED | OUTPATIENT
Start: 2018-01-01 | End: 2018-01-01

## 2018-01-01 RX ORDER — HEPARIN SODIUM 5000 [USP'U]/ML
5000 INJECTION, SOLUTION INTRAVENOUS; SUBCUTANEOUS EVERY 8 HOURS
Status: DISCONTINUED | OUTPATIENT
Start: 2018-01-01 | End: 2018-01-01

## 2018-01-01 RX ORDER — SODIUM CHLORIDE 9 MG/ML
INJECTION, SOLUTION INTRAVENOUS CONTINUOUS
Status: DISCONTINUED | OUTPATIENT
Start: 2018-01-01 | End: 2018-01-01

## 2018-01-01 RX ORDER — CALCIUM ACETATE 667 MG/1
667 TABLET ORAL
Status: DISCONTINUED | OUTPATIENT
Start: 2018-01-01 | End: 2018-01-01

## 2018-01-01 RX ORDER — SODIUM CHLORIDE 9 MG/ML
INJECTION, SOLUTION INTRAVENOUS CONTINUOUS
Status: DISPENSED | OUTPATIENT
Start: 2018-01-01 | End: 2018-01-01

## 2018-01-01 RX ORDER — SODIUM CHLORIDE FOR INHALATION 3 %
3 VIAL, NEBULIZER (ML) INHALATION
Status: DISCONTINUED | OUTPATIENT
Start: 2018-01-01 | End: 2018-01-01

## 2018-01-01 RX ORDER — MINERAL OIL, WHITE PETROLATUM .03; .94 G/G; G/G
1 OINTMENT OPHTHALMIC EVERY 8 HOURS
Status: DISCONTINUED | OUTPATIENT
Start: 2018-01-01 | End: 2018-01-01

## 2018-01-01 RX ORDER — BISACODYL 10 MG
10 SUPPOSITORY, RECTAL RECTAL
Status: DISCONTINUED | OUTPATIENT
Start: 2018-01-01 | End: 2018-01-01

## 2018-01-01 RX ORDER — POTASSIUM CHLORIDE 7.45 MG/ML
10 INJECTION INTRAVENOUS ONCE
Status: COMPLETED | OUTPATIENT
Start: 2018-01-01 | End: 2018-01-01

## 2018-01-01 RX ORDER — 0.9 % SODIUM CHLORIDE 0.9 %
10 INTRAVENOUS SOLUTION INTRAVENOUS
Status: DISCONTINUED | OUTPATIENT
Start: 2018-01-01 | End: 2018-01-01

## 2018-01-01 RX ORDER — ALBUMIN (HUMAN) 12.5 G/50ML
12.5 SOLUTION INTRAVENOUS
Status: DISCONTINUED | OUTPATIENT
Start: 2018-01-01 | End: 2018-01-01

## 2018-01-01 RX ORDER — 0.9 % SODIUM CHLORIDE 0.9 %
20 INTRAVENOUS SOLUTION INTRAVENOUS ONCE
Status: ACTIVE | OUTPATIENT
Start: 2018-01-01 | End: 2018-01-01

## 2018-01-01 RX ORDER — SODIUM CHLORIDE 9 MG/ML
INJECTION, SOLUTION INTRAVENOUS
Status: ACTIVE
Start: 2018-01-01 | End: 2018-01-01

## 2018-01-01 RX ORDER — MEPERIDINE HYDROCHLORIDE 50 MG/ML
25 INJECTION INTRAMUSCULAR; INTRAVENOUS; SUBCUTANEOUS
Status: DISCONTINUED | OUTPATIENT
Start: 2018-01-01 | End: 2018-01-01

## 2018-01-01 RX ORDER — AMIODARONE HYDROCHLORIDE 200 MG/1
400 TABLET ORAL TWICE DAILY
Status: DISCONTINUED | OUTPATIENT
Start: 2018-01-01 | End: 2018-01-01

## 2018-01-01 RX ORDER — FUROSEMIDE 40 MG/1
40-80 TABLET ORAL 2 TIMES DAILY
COMMUNITY

## 2018-01-01 RX ORDER — SODIUM CHLORIDE 9 MG/ML
1000 INJECTION, SOLUTION INTRAVENOUS ONCE
Status: COMPLETED | OUTPATIENT
Start: 2018-01-01 | End: 2018-01-01

## 2018-01-01 RX ORDER — MAGNESIUM SULFATE HEPTAHYDRATE 40 MG/ML
.5-2 INJECTION, SOLUTION INTRAVENOUS CONTINUOUS
Status: DISPENSED | OUTPATIENT
Start: 2018-01-01 | End: 2018-01-01

## 2018-01-01 RX ORDER — FAMOTIDINE 20 MG/1
20 TABLET, FILM COATED ORAL DAILY
Status: DISCONTINUED | OUTPATIENT
Start: 2018-01-01 | End: 2018-01-01

## 2018-01-01 RX ORDER — MAGNESIUM SULFATE HEPTAHYDRATE 40 MG/ML
.5-2 INJECTION, SOLUTION INTRAVENOUS CONTINUOUS
Status: DISCONTINUED | OUTPATIENT
Start: 2018-01-01 | End: 2018-01-01

## 2018-01-01 RX ORDER — DEXTROSE AND SODIUM CHLORIDE 5; .45 G/100ML; G/100ML
INJECTION, SOLUTION INTRAVENOUS CONTINUOUS
Status: DISCONTINUED | OUTPATIENT
Start: 2018-01-01 | End: 2018-01-01

## 2018-01-01 RX ORDER — GABAPENTIN 100 MG/1
100 CAPSULE ORAL 3 TIMES DAILY
Status: ON HOLD | COMMUNITY
End: 2018-01-01

## 2018-01-01 RX ORDER — MIDAZOLAM HYDROCHLORIDE 1 MG/ML
2 INJECTION INTRAMUSCULAR; INTRAVENOUS ONCE
Status: DISCONTINUED | OUTPATIENT
Start: 2018-01-01 | End: 2018-01-01

## 2018-01-01 RX ORDER — MORPHINE SULFATE 4 MG/ML
INJECTION, SOLUTION INTRAMUSCULAR; INTRAVENOUS
Status: COMPLETED
Start: 2018-01-01 | End: 2018-01-01

## 2018-01-01 RX ORDER — MEPERIDINE HYDROCHLORIDE 50 MG/ML
25 INJECTION INTRAMUSCULAR; INTRAVENOUS; SUBCUTANEOUS
Status: DISPENSED | OUTPATIENT
Start: 2018-01-01 | End: 2018-01-01

## 2018-01-01 RX ORDER — HEPARIN SODIUM 1000 [USP'U]/ML
7000 INJECTION, SOLUTION INTRAVENOUS; SUBCUTANEOUS ONCE
Status: DISCONTINUED | OUTPATIENT
Start: 2018-01-01 | End: 2018-01-01

## 2018-01-01 RX ORDER — BETAMETHASONE DIPROPIONATE 0.5 MG/G
1 CREAM TOPICAL 2 TIMES DAILY
COMMUNITY

## 2018-01-01 RX ORDER — ATROPINE SULFATE 10 MG/ML
2 SOLUTION/ DROPS OPHTHALMIC EVERY 4 HOURS PRN
Status: DISCONTINUED | OUTPATIENT
Start: 2018-01-01 | End: 2018-01-01 | Stop reason: HOSPADM

## 2018-01-01 RX ORDER — 0.9 % SODIUM CHLORIDE 0.9 %
20 INTRAVENOUS SOLUTION INTRAVENOUS ONCE
Status: DISCONTINUED | OUTPATIENT
Start: 2018-01-01 | End: 2018-01-01

## 2018-01-01 RX ORDER — NOREPINEPHRINE BITARTRATE 1 MG/ML
INJECTION, SOLUTION INTRAVENOUS
Status: COMPLETED
Start: 2018-01-01 | End: 2018-01-01

## 2018-01-01 RX ORDER — DEXTROSE MONOHYDRATE 25 G/50ML
12.5-25 INJECTION, SOLUTION INTRAVENOUS PRN
Status: DISCONTINUED | OUTPATIENT
Start: 2018-01-01 | End: 2018-01-01

## 2018-01-01 RX ORDER — LORAZEPAM 2 MG/ML
1-2 INJECTION INTRAMUSCULAR
Status: DISCONTINUED | OUTPATIENT
Start: 2018-01-01 | End: 2018-01-01 | Stop reason: HOSPADM

## 2018-01-01 RX ORDER — DOCUSATE SODIUM 100 MG/1
100-200 CAPSULE, LIQUID FILLED ORAL
COMMUNITY

## 2018-01-01 RX ORDER — ASPIRIN 325 MG
325 TABLET ORAL DAILY
Status: DISCONTINUED | OUTPATIENT
Start: 2018-01-01 | End: 2018-01-01

## 2018-01-01 RX ORDER — OMEPRAZOLE 20 MG/1
20 CAPSULE, DELAYED RELEASE ORAL DAILY
COMMUNITY

## 2018-01-01 RX ORDER — HEPARIN SODIUM 1000 [USP'U]/ML
2000 INJECTION, SOLUTION INTRAVENOUS; SUBCUTANEOUS PRN
Status: DISCONTINUED | OUTPATIENT
Start: 2018-01-01 | End: 2018-01-01

## 2018-01-01 RX ORDER — ROCURONIUM BROMIDE 10 MG/ML
100 INJECTION, SOLUTION INTRAVENOUS ONCE
Status: COMPLETED | OUTPATIENT
Start: 2018-01-01 | End: 2018-01-01

## 2018-01-01 RX ORDER — PRENATAL VIT 91/IRON/FOLIC/DHA 28-975-200
1 COMBINATION PACKAGE (EA) ORAL 2 TIMES DAILY
COMMUNITY

## 2018-01-01 RX ORDER — ETOMIDATE 2 MG/ML
20 INJECTION INTRAVENOUS ONCE
Status: COMPLETED | OUTPATIENT
Start: 2018-01-01 | End: 2018-01-01

## 2018-01-01 RX ORDER — POLYETHYLENE GLYCOL 3350 17 G/17G
1 POWDER, FOR SOLUTION ORAL
Status: DISCONTINUED | OUTPATIENT
Start: 2018-01-01 | End: 2018-01-01

## 2018-01-01 RX ORDER — HEPARIN SODIUM 1000 [USP'U]/ML
3300 INJECTION, SOLUTION INTRAVENOUS; SUBCUTANEOUS
Status: DISCONTINUED | OUTPATIENT
Start: 2018-01-01 | End: 2018-01-01

## 2018-01-01 RX ADMIN — NOREPINEPHRINE BITARTRATE 4 MCG/MIN: 1 INJECTION INTRAVENOUS at 15:36

## 2018-01-01 RX ADMIN — ATORVASTATIN CALCIUM 80 MG: 20 TABLET, FILM COATED ORAL at 09:00

## 2018-01-01 RX ADMIN — AMPICILLIN SODIUM AND SULBACTAM SODIUM 3 G: 2; 1 INJECTION, POWDER, FOR SOLUTION INTRAMUSCULAR; INTRAVENOUS at 09:49

## 2018-01-01 RX ADMIN — IPRATROPIUM BROMIDE AND ALBUTEROL SULFATE 3 ML: .5; 3 SOLUTION RESPIRATORY (INHALATION) at 06:29

## 2018-01-01 RX ADMIN — DEXTROSE AND SODIUM CHLORIDE: 5; 450 INJECTION, SOLUTION INTRAVENOUS at 03:20

## 2018-01-01 RX ADMIN — INSULIN HUMAN 3 UNITS: 100 INJECTION, SOLUTION PARENTERAL at 01:38

## 2018-01-01 RX ADMIN — NOREPINEPHRINE BITARTRATE 8 MCG/MIN: 1 INJECTION INTRAVENOUS at 22:00

## 2018-01-01 RX ADMIN — IPRATROPIUM BROMIDE AND ALBUTEROL SULFATE 3 ML: .5; 3 SOLUTION RESPIRATORY (INHALATION) at 15:32

## 2018-01-01 RX ADMIN — PROPOFOL 20 MCG/KG/MIN: 10 INJECTION, EMULSION INTRAVENOUS at 05:44

## 2018-01-01 RX ADMIN — AMPICILLIN SODIUM AND SULBACTAM SODIUM 3 G: 2; 1 INJECTION, POWDER, FOR SOLUTION INTRAMUSCULAR; INTRAVENOUS at 17:42

## 2018-01-01 RX ADMIN — AMIODARONE HYDROCHLORIDE 400 MG: 200 TABLET ORAL at 11:13

## 2018-01-01 RX ADMIN — SODIUM CHLORIDE 250 ML: 9 INJECTION, SOLUTION INTRAVENOUS at 07:06

## 2018-01-01 RX ADMIN — AMIODARONE HYDROCHLORIDE 150 MG: 50 INJECTION, SOLUTION INTRAVENOUS at 23:25

## 2018-01-01 RX ADMIN — INSULIN HUMAN 3 UNITS: 100 INJECTION, SOLUTION PARENTERAL at 05:47

## 2018-01-01 RX ADMIN — AMPICILLIN SODIUM AND SULBACTAM SODIUM 3 G: 2; 1 INJECTION, POWDER, FOR SOLUTION INTRAMUSCULAR; INTRAVENOUS at 00:08

## 2018-01-01 RX ADMIN — SODIUM CHLORIDE: 9 INJECTION, SOLUTION INTRAVENOUS at 00:53

## 2018-01-01 RX ADMIN — ATORVASTATIN CALCIUM 80 MG: 20 TABLET, FILM COATED ORAL at 10:05

## 2018-01-01 RX ADMIN — AMIODARONE HYDROCHLORIDE 1 MG/MIN: 50 INJECTION, SOLUTION INTRAVENOUS at 04:47

## 2018-01-01 RX ADMIN — IPRATROPIUM BROMIDE AND ALBUTEROL SULFATE 3 ML: .5; 3 SOLUTION RESPIRATORY (INHALATION) at 02:37

## 2018-01-01 RX ADMIN — ASPIRIN 325 MG: 325 TABLET ORAL at 09:50

## 2018-01-01 RX ADMIN — AMIODARONE HYDROCHLORIDE 1 MG/MIN: 50 INJECTION, SOLUTION INTRAVENOUS at 01:48

## 2018-01-01 RX ADMIN — VASOPRESSIN 0.03 UNITS/MIN: 20 INJECTION INTRAVENOUS at 14:30

## 2018-01-01 RX ADMIN — ASPIRIN 325 MG: 325 TABLET ORAL at 08:12

## 2018-01-01 RX ADMIN — SODIUM CHLORIDE 2.5 UNITS/HR: 9 INJECTION, SOLUTION INTRAVENOUS at 11:30

## 2018-01-01 RX ADMIN — LORAZEPAM 2 MG: 2 INJECTION INTRAMUSCULAR; INTRAVENOUS at 18:25

## 2018-01-01 RX ADMIN — AMIODARONE HYDROCHLORIDE 1 MG/MIN: 50 INJECTION, SOLUTION INTRAVENOUS at 05:20

## 2018-01-01 RX ADMIN — PROPOFOL 5 MCG/KG/MIN: 10 INJECTION, EMULSION INTRAVENOUS at 04:51

## 2018-01-01 RX ADMIN — INSULIN HUMAN 1 UNITS: 100 INJECTION, SOLUTION PARENTERAL at 05:10

## 2018-01-01 RX ADMIN — VASOPRESSIN 0.03 UNITS/MIN: 20 INJECTION INTRAVENOUS at 01:47

## 2018-01-01 RX ADMIN — FUROSEMIDE 40 MG: 10 INJECTION, SOLUTION INTRAMUSCULAR; INTRAVENOUS at 21:24

## 2018-01-01 RX ADMIN — ASPIRIN 325 MG: 325 TABLET ORAL at 10:13

## 2018-01-01 RX ADMIN — EPINEPHRINE 6 MCG/MIN: 1 INJECTION, SOLUTION INTRAMUSCULAR; SUBCUTANEOUS at 23:00

## 2018-01-01 RX ADMIN — IPRATROPIUM BROMIDE AND ALBUTEROL SULFATE 3 ML: .5; 3 SOLUTION RESPIRATORY (INHALATION) at 03:15

## 2018-01-01 RX ADMIN — ALBUMIN (HUMAN) 12.5 G: 0.25 INJECTION, SOLUTION INTRAVENOUS at 17:24

## 2018-01-01 RX ADMIN — MORPHINE SULFATE 50 MG: 50 INJECTION, SOLUTION, CONCENTRATE INTRAVENOUS at 16:44

## 2018-01-01 RX ADMIN — AMIODARONE HYDROCHLORIDE 1 MG/MIN: 50 INJECTION, SOLUTION INTRAVENOUS at 01:44

## 2018-01-01 RX ADMIN — INSULIN HUMAN 3 UNITS: 100 INJECTION, SOLUTION PARENTERAL at 05:27

## 2018-01-01 RX ADMIN — INSULIN HUMAN 2 UNITS: 100 INJECTION, SOLUTION PARENTERAL at 18:15

## 2018-01-01 RX ADMIN — SODIUM CHLORIDE: 9 INJECTION, SOLUTION INTRAVENOUS at 22:56

## 2018-01-01 RX ADMIN — POTASSIUM CHLORIDE 10 MEQ: 7.46 INJECTION, SOLUTION INTRAVENOUS at 04:52

## 2018-01-01 RX ADMIN — SODIUM CHLORIDE SOLN NEBU 3% 3 ML: 3 NEBU SOLN at 14:35

## 2018-01-01 RX ADMIN — IPRATROPIUM BROMIDE AND ALBUTEROL SULFATE 3 ML: .5; 3 SOLUTION RESPIRATORY (INHALATION) at 18:43

## 2018-01-01 RX ADMIN — MAGNESIUM SULFATE IN WATER 0.5 G/HR: 40 INJECTION, SOLUTION INTRAVENOUS at 02:30

## 2018-01-01 RX ADMIN — DEXTROSE AND SODIUM CHLORIDE: 5; 450 INJECTION, SOLUTION INTRAVENOUS at 02:19

## 2018-01-01 RX ADMIN — NOREPINEPHRINE BITARTRATE 20 MCG/MIN: 1 INJECTION INTRAVENOUS at 17:57

## 2018-01-01 RX ADMIN — INSULIN HUMAN 3 UNITS: 100 INJECTION, SOLUTION PARENTERAL at 12:30

## 2018-01-01 RX ADMIN — INSULIN HUMAN 2 UNITS: 100 INJECTION, SOLUTION PARENTERAL at 18:00

## 2018-01-01 RX ADMIN — AMPICILLIN SODIUM AND SULBACTAM SODIUM 3 G: 2; 1 INJECTION, POWDER, FOR SOLUTION INTRAMUSCULAR; INTRAVENOUS at 21:10

## 2018-01-01 RX ADMIN — FAMOTIDINE 20 MG: 20 TABLET, FILM COATED ORAL at 05:46

## 2018-01-01 RX ADMIN — HEPARIN SODIUM 3300 UNITS: 1000 INJECTION, SOLUTION INTRAVENOUS; SUBCUTANEOUS at 17:22

## 2018-01-01 RX ADMIN — VASOPRESSIN 0.03 UNITS/MIN: 20 INJECTION INTRAVENOUS at 02:19

## 2018-01-01 RX ADMIN — SODIUM CHLORIDE SOLN NEBU 3% 3 ML: 3 NEBU SOLN at 11:36

## 2018-01-01 RX ADMIN — FUROSEMIDE 40 MG: 10 INJECTION, SOLUTION INTRAMUSCULAR; INTRAVENOUS at 20:47

## 2018-01-01 RX ADMIN — PROPOFOL 20 MCG/KG/MIN: 10 INJECTION, EMULSION INTRAVENOUS at 16:27

## 2018-01-01 RX ADMIN — HEPARIN SODIUM 2000 UNITS: 1000 INJECTION, SOLUTION INTRAVENOUS; SUBCUTANEOUS at 06:50

## 2018-01-01 RX ADMIN — AMPICILLIN SODIUM AND SULBACTAM SODIUM 3 G: 2; 1 INJECTION, POWDER, FOR SOLUTION INTRAMUSCULAR; INTRAVENOUS at 09:41

## 2018-01-01 RX ADMIN — VASOPRESSIN 0.03 UNITS/MIN: 20 INJECTION INTRAVENOUS at 13:10

## 2018-01-01 RX ADMIN — NOREPINEPHRINE BITARTRATE 30 MCG/MIN: 1 INJECTION INTRAVENOUS at 09:09

## 2018-01-01 RX ADMIN — SODIUM BICARBONATE 50 MEQ: 84 INJECTION, SOLUTION INTRAVENOUS at 00:56

## 2018-01-01 RX ADMIN — PHENYLEPHRINE HYDROCHLORIDE 55 MCG/MIN: 10 INJECTION INTRAVENOUS at 13:34

## 2018-01-01 RX ADMIN — AMIODARONE HYDROCHLORIDE 1 MG/MIN: 50 INJECTION, SOLUTION INTRAVENOUS at 21:25

## 2018-01-01 RX ADMIN — ASPIRIN 325 MG: 325 TABLET ORAL at 09:40

## 2018-01-01 RX ADMIN — HEPARIN SODIUM 3300 UNITS: 1000 INJECTION, SOLUTION INTRAVENOUS; SUBCUTANEOUS at 07:23

## 2018-01-01 RX ADMIN — ATORVASTATIN CALCIUM 80 MG: 20 TABLET, FILM COATED ORAL at 09:49

## 2018-01-01 RX ADMIN — INSULIN HUMAN 2 UNITS: 100 INJECTION, SOLUTION PARENTERAL at 14:58

## 2018-01-01 RX ADMIN — IPRATROPIUM BROMIDE AND ALBUTEROL SULFATE 3 ML: .5; 3 SOLUTION RESPIRATORY (INHALATION) at 22:31

## 2018-01-01 RX ADMIN — FUROSEMIDE 40 MG: 10 INJECTION, SOLUTION INTRAMUSCULAR; INTRAVENOUS at 20:11

## 2018-01-01 RX ADMIN — AMIODARONE HYDROCHLORIDE 400 MG: 200 TABLET ORAL at 10:04

## 2018-01-01 RX ADMIN — PROPOFOL 20 MCG/KG/MIN: 10 INJECTION, EMULSION INTRAVENOUS at 11:26

## 2018-01-01 RX ADMIN — INSULIN HUMAN 2 UNITS: 100 INJECTION, SOLUTION PARENTERAL at 06:27

## 2018-01-01 RX ADMIN — FUROSEMIDE 40 MG: 10 INJECTION, SOLUTION INTRAMUSCULAR; INTRAVENOUS at 08:12

## 2018-01-01 RX ADMIN — VASOPRESSIN 0.03 UNITS/MIN: 20 INJECTION INTRAVENOUS at 06:13

## 2018-01-01 RX ADMIN — FUROSEMIDE 40 MG: 10 INJECTION, SOLUTION INTRAMUSCULAR; INTRAVENOUS at 20:21

## 2018-01-01 RX ADMIN — AMPICILLIN SODIUM AND SULBACTAM SODIUM 3 G: 2; 1 INJECTION, POWDER, FOR SOLUTION INTRAMUSCULAR; INTRAVENOUS at 08:14

## 2018-01-01 RX ADMIN — IPRATROPIUM BROMIDE AND ALBUTEROL SULFATE 3 ML: .5; 3 SOLUTION RESPIRATORY (INHALATION) at 01:53

## 2018-01-01 RX ADMIN — PHENYLEPHRINE HYDROCHLORIDE 50 MCG/MIN: 10 INJECTION INTRAVENOUS at 06:55

## 2018-01-01 RX ADMIN — HEPARIN SODIUM 5000 UNITS: 5000 INJECTION, SOLUTION INTRAVENOUS; SUBCUTANEOUS at 10:04

## 2018-01-01 RX ADMIN — SODIUM CHLORIDE SOLN NEBU 3% 3 ML: 3 NEBU SOLN at 06:35

## 2018-01-01 RX ADMIN — DEXTROSE MONOHYDRATE 25 ML: 25 INJECTION, SOLUTION INTRAVENOUS at 23:58

## 2018-01-01 RX ADMIN — AMPICILLIN SODIUM AND SULBACTAM SODIUM 3 G: 2; 1 INJECTION, POWDER, FOR SOLUTION INTRAMUSCULAR; INTRAVENOUS at 20:21

## 2018-01-01 RX ADMIN — VASOPRESSIN 0.03 UNITS/MIN: 20 INJECTION INTRAVENOUS at 13:58

## 2018-01-01 RX ADMIN — FUROSEMIDE 40 MG: 10 INJECTION, SOLUTION INTRAMUSCULAR; INTRAVENOUS at 09:49

## 2018-01-01 RX ADMIN — IPRATROPIUM BROMIDE AND ALBUTEROL SULFATE 3 ML: .5; 3 SOLUTION RESPIRATORY (INHALATION) at 02:13

## 2018-01-01 RX ADMIN — IPRATROPIUM BROMIDE AND ALBUTEROL SULFATE 3 ML: .5; 3 SOLUTION RESPIRATORY (INHALATION) at 14:39

## 2018-01-01 RX ADMIN — HEPARIN SODIUM 5000 UNITS: 5000 INJECTION, SOLUTION INTRAVENOUS; SUBCUTANEOUS at 14:00

## 2018-01-01 RX ADMIN — SODIUM CHLORIDE SOLN NEBU 3% 3 ML: 3 NEBU SOLN at 10:22

## 2018-01-01 RX ADMIN — IPRATROPIUM BROMIDE AND ALBUTEROL SULFATE 3 ML: .5; 3 SOLUTION RESPIRATORY (INHALATION) at 14:34

## 2018-01-01 RX ADMIN — VASOPRESSIN 0.03 UNITS/MIN: 20 INJECTION INTRAVENOUS at 00:28

## 2018-01-01 RX ADMIN — PROPOFOL 25 MCG/KG/MIN: 10 INJECTION, EMULSION INTRAVENOUS at 11:08

## 2018-01-01 RX ADMIN — NOREPINEPHRINE BITARTRATE 30 MCG/MIN: 1 INJECTION INTRAVENOUS at 02:30

## 2018-01-01 RX ADMIN — IPRATROPIUM BROMIDE AND ALBUTEROL SULFATE 3 ML: .5; 3 SOLUTION RESPIRATORY (INHALATION) at 15:25

## 2018-01-01 RX ADMIN — NOREPINEPHRINE BITARTRATE 8 MG: 1 INJECTION INTRAVENOUS at 07:05

## 2018-01-01 RX ADMIN — INSULIN HUMAN 2 UNITS: 100 INJECTION, SOLUTION PARENTERAL at 00:59

## 2018-01-01 RX ADMIN — IPRATROPIUM BROMIDE AND ALBUTEROL SULFATE 3 ML: .5; 3 SOLUTION RESPIRATORY (INHALATION) at 10:59

## 2018-01-01 RX ADMIN — AMIODARONE HYDROCHLORIDE 1 MG/MIN: 50 INJECTION, SOLUTION INTRAVENOUS at 10:20

## 2018-01-01 RX ADMIN — INSULIN HUMAN 2 UNITS: 100 INJECTION, SOLUTION PARENTERAL at 11:17

## 2018-01-01 RX ADMIN — WHITE PETROLATUM 1 APPLICATION: .15; .85 OINTMENT OPHTHALMIC at 13:35

## 2018-01-01 RX ADMIN — AMIODARONE HYDROCHLORIDE 1 MG/MIN: 50 INJECTION, SOLUTION INTRAVENOUS at 18:45

## 2018-01-01 RX ADMIN — AMIODARONE HYDROCHLORIDE 1 MG/MIN: 50 INJECTION, SOLUTION INTRAVENOUS at 20:06

## 2018-01-01 RX ADMIN — SODIUM CHLORIDE SOLN NEBU 3% 3 ML: 3 NEBU SOLN at 06:34

## 2018-01-01 RX ADMIN — IPRATROPIUM BROMIDE AND ALBUTEROL SULFATE 3 ML: .5; 3 SOLUTION RESPIRATORY (INHALATION) at 19:03

## 2018-01-01 RX ADMIN — SODIUM CHLORIDE: 9 INJECTION, SOLUTION INTRAVENOUS at 11:42

## 2018-01-01 RX ADMIN — AMPICILLIN SODIUM AND SULBACTAM SODIUM 3 G: 2; 1 INJECTION, POWDER, FOR SOLUTION INTRAMUSCULAR; INTRAVENOUS at 21:24

## 2018-01-01 RX ADMIN — POTASSIUM CHLORIDE 10 MEQ: 7.46 INJECTION, SOLUTION INTRAVENOUS at 13:53

## 2018-01-01 RX ADMIN — IPRATROPIUM BROMIDE AND ALBUTEROL SULFATE 3 ML: .5; 3 SOLUTION RESPIRATORY (INHALATION) at 22:49

## 2018-01-01 RX ADMIN — FAMOTIDINE 20 MG: 20 TABLET, FILM COATED ORAL at 05:54

## 2018-01-01 RX ADMIN — SODIUM CHLORIDE 19 UNITS/HR: 9 INJECTION, SOLUTION INTRAVENOUS at 00:14

## 2018-01-01 RX ADMIN — AMIODARONE HYDROCHLORIDE 1 MG/MIN: 50 INJECTION, SOLUTION INTRAVENOUS at 00:15

## 2018-01-01 RX ADMIN — AMIODARONE HYDROCHLORIDE 400 MG: 200 TABLET ORAL at 10:54

## 2018-01-01 RX ADMIN — IPRATROPIUM BROMIDE AND ALBUTEROL SULFATE 3 ML: .5; 3 SOLUTION RESPIRATORY (INHALATION) at 20:42

## 2018-01-01 RX ADMIN — VASOPRESSIN 0.03 UNITS/MIN: 20 INJECTION INTRAVENOUS at 16:28

## 2018-01-01 RX ADMIN — SODIUM CHLORIDE SOLN NEBU 3% 3 ML: 3 NEBU SOLN at 18:36

## 2018-01-01 RX ADMIN — IPRATROPIUM BROMIDE AND ALBUTEROL SULFATE 3 ML: .5; 3 SOLUTION RESPIRATORY (INHALATION) at 14:37

## 2018-01-01 RX ADMIN — IPRATROPIUM BROMIDE AND ALBUTEROL SULFATE 3 ML: .5; 3 SOLUTION RESPIRATORY (INHALATION) at 10:19

## 2018-01-01 RX ADMIN — PHENYLEPHRINE HYDROCHLORIDE 50 MCG/MIN: 10 INJECTION INTRAVENOUS at 01:47

## 2018-01-01 RX ADMIN — AMPICILLIN SODIUM AND SULBACTAM SODIUM 3 G: 2; 1 INJECTION, POWDER, FOR SOLUTION INTRAMUSCULAR; INTRAVENOUS at 05:10

## 2018-01-01 RX ADMIN — AMIODARONE HYDROCHLORIDE 1 MG/MIN: 50 INJECTION, SOLUTION INTRAVENOUS at 00:30

## 2018-01-01 RX ADMIN — AMIODARONE HYDROCHLORIDE 1 MG/MIN: 50 INJECTION, SOLUTION INTRAVENOUS at 09:41

## 2018-01-01 RX ADMIN — SODIUM CHLORIDE SOLN NEBU 3% 3 ML: 3 NEBU SOLN at 15:14

## 2018-01-01 RX ADMIN — POTASSIUM CHLORIDE 10 MEQ: 7.46 INJECTION, SOLUTION INTRAVENOUS at 20:05

## 2018-01-01 RX ADMIN — FAMOTIDINE 20 MG: 10 INJECTION, SOLUTION INTRAVENOUS at 05:45

## 2018-01-01 RX ADMIN — STANDARDIZED SENNA CONCENTRATE AND DOCUSATE SODIUM 2 TABLET: 8.6; 5 TABLET, FILM COATED ORAL at 10:05

## 2018-01-01 RX ADMIN — SODIUM CHLORIDE 1000 ML: 9 INJECTION, SOLUTION INTRAVENOUS at 21:45

## 2018-01-01 RX ADMIN — HEPARIN SODIUM 2000 UNITS: 1000 INJECTION, SOLUTION INTRAVENOUS; SUBCUTANEOUS at 14:20

## 2018-01-01 RX ADMIN — SODIUM CHLORIDE 500 ML: 9 INJECTION, SOLUTION INTRAVENOUS at 04:48

## 2018-01-01 RX ADMIN — INSULIN HUMAN 2 UNITS: 100 INJECTION, SOLUTION PARENTERAL at 03:05

## 2018-01-01 RX ADMIN — FAMOTIDINE 20 MG: 10 INJECTION, SOLUTION INTRAVENOUS at 05:02

## 2018-01-01 RX ADMIN — IPRATROPIUM BROMIDE AND ALBUTEROL SULFATE 3 ML: .5; 3 SOLUTION RESPIRATORY (INHALATION) at 06:06

## 2018-01-01 RX ADMIN — FAMOTIDINE 20 MG: 10 INJECTION, SOLUTION INTRAVENOUS at 04:51

## 2018-01-01 RX ADMIN — IPRATROPIUM BROMIDE AND ALBUTEROL SULFATE 3 ML: .5; 3 SOLUTION RESPIRATORY (INHALATION) at 06:35

## 2018-01-01 RX ADMIN — ASPIRIN 325 MG: 325 TABLET ORAL at 10:04

## 2018-01-01 RX ADMIN — AMIODARONE HYDROCHLORIDE 400 MG: 200 TABLET ORAL at 20:11

## 2018-01-01 RX ADMIN — CALCIUM CHLORIDE 0.5 G: 100 INJECTION, SOLUTION INTRAVENOUS at 20:03

## 2018-01-01 RX ADMIN — SODIUM CHLORIDE SOLN NEBU 3% 3 ML: 3 NEBU SOLN at 11:03

## 2018-01-01 RX ADMIN — IPRATROPIUM BROMIDE AND ALBUTEROL SULFATE 3 ML: .5; 3 SOLUTION RESPIRATORY (INHALATION) at 09:56

## 2018-01-01 RX ADMIN — ATORVASTATIN CALCIUM 80 MG: 20 TABLET, FILM COATED ORAL at 10:13

## 2018-01-01 RX ADMIN — IPRATROPIUM BROMIDE AND ALBUTEROL SULFATE 3 ML: .5; 3 SOLUTION RESPIRATORY (INHALATION) at 14:26

## 2018-01-01 RX ADMIN — INSULIN HUMAN 2 UNITS: 100 INJECTION, SOLUTION PARENTERAL at 12:00

## 2018-01-01 RX ADMIN — VASOPRESSIN 0.03 UNITS/MIN: 20 INJECTION INTRAVENOUS at 01:48

## 2018-01-01 RX ADMIN — FUROSEMIDE 40 MG: 10 INJECTION, SOLUTION INTRAMUSCULAR; INTRAVENOUS at 10:04

## 2018-01-01 RX ADMIN — PROPOFOL 20 MCG/KG/MIN: 10 INJECTION, EMULSION INTRAVENOUS at 22:57

## 2018-01-01 RX ADMIN — NOREPINEPHRINE BITARTRATE 20 MCG/MIN: 1 INJECTION INTRAVENOUS at 01:47

## 2018-01-01 RX ADMIN — IPRATROPIUM BROMIDE AND ALBUTEROL SULFATE 3 ML: .5; 3 SOLUTION RESPIRATORY (INHALATION) at 15:14

## 2018-01-01 RX ADMIN — ROCURONIUM BROMIDE 100 MG: 10 INJECTION, SOLUTION INTRAVENOUS at 22:00

## 2018-01-01 RX ADMIN — IPRATROPIUM BROMIDE AND ALBUTEROL SULFATE 3 ML: .5; 3 SOLUTION RESPIRATORY (INHALATION) at 19:18

## 2018-01-01 RX ADMIN — MEPERIDINE HYDROCHLORIDE 25 MG: 50 INJECTION INTRAMUSCULAR; INTRAVENOUS; SUBCUTANEOUS at 10:22

## 2018-01-01 RX ADMIN — AMPICILLIN SODIUM AND SULBACTAM SODIUM 3 G: 2; 1 INJECTION, POWDER, FOR SOLUTION INTRAMUSCULAR; INTRAVENOUS at 20:47

## 2018-01-01 RX ADMIN — ATORVASTATIN CALCIUM 80 MG: 20 TABLET, FILM COATED ORAL at 08:12

## 2018-01-01 RX ADMIN — IPRATROPIUM BROMIDE AND ALBUTEROL SULFATE 3 ML: .5; 3 SOLUTION RESPIRATORY (INHALATION) at 02:41

## 2018-01-01 RX ADMIN — ASPIRIN 325 MG: 325 TABLET ORAL at 08:32

## 2018-01-01 RX ADMIN — INSULIN HUMAN 3 UNITS: 100 INJECTION, SOLUTION PARENTERAL at 06:02

## 2018-01-01 RX ADMIN — AMPICILLIN SODIUM AND SULBACTAM SODIUM 3 G: 2; 1 INJECTION, POWDER, FOR SOLUTION INTRAMUSCULAR; INTRAVENOUS at 03:07

## 2018-01-01 RX ADMIN — AMPICILLIN SODIUM AND SULBACTAM SODIUM 3 G: 2; 1 INJECTION, POWDER, FOR SOLUTION INTRAMUSCULAR; INTRAVENOUS at 10:20

## 2018-01-01 RX ADMIN — WHITE PETROLATUM 1 APPLICATION: .15; .85 OINTMENT OPHTHALMIC at 06:00

## 2018-01-01 RX ADMIN — IPRATROPIUM BROMIDE AND ALBUTEROL SULFATE 3 ML: .5; 3 SOLUTION RESPIRATORY (INHALATION) at 11:03

## 2018-01-01 RX ADMIN — IPRATROPIUM BROMIDE AND ALBUTEROL SULFATE 3 ML: .5; 3 SOLUTION RESPIRATORY (INHALATION) at 18:33

## 2018-01-01 RX ADMIN — FUROSEMIDE 40 MG: 10 INJECTION, SOLUTION INTRAMUSCULAR; INTRAVENOUS at 11:05

## 2018-01-01 RX ADMIN — FAMOTIDINE 20 MG: 10 INJECTION, SOLUTION INTRAVENOUS at 04:30

## 2018-01-01 RX ADMIN — ETOMIDATE 20 MG: 2 INJECTION INTRAVENOUS at 22:00

## 2018-01-01 RX ADMIN — AMIODARONE HYDROCHLORIDE 1 MG/MIN: 50 INJECTION, SOLUTION INTRAVENOUS at 09:52

## 2018-01-01 RX ADMIN — SODIUM CHLORIDE SOLN NEBU 3% 3 ML: 3 NEBU SOLN at 22:49

## 2018-01-01 RX ADMIN — IPRATROPIUM BROMIDE AND ALBUTEROL SULFATE 3 ML: .5; 3 SOLUTION RESPIRATORY (INHALATION) at 07:03

## 2018-01-01 RX ADMIN — ALBUMIN (HUMAN) 12.5 G: 0.25 INJECTION, SOLUTION INTRAVENOUS at 12:30

## 2018-01-01 RX ADMIN — IPRATROPIUM BROMIDE AND ALBUTEROL SULFATE 3 ML: .5; 3 SOLUTION RESPIRATORY (INHALATION) at 11:35

## 2018-01-01 RX ADMIN — STANDARDIZED SENNA CONCENTRATE AND DOCUSATE SODIUM 2 TABLET: 8.6; 5 TABLET, FILM COATED ORAL at 08:32

## 2018-01-01 RX ADMIN — SODIUM CHLORIDE 16 UNITS/HR: 9 INJECTION, SOLUTION INTRAVENOUS at 20:24

## 2018-01-01 RX ADMIN — IPRATROPIUM BROMIDE AND ALBUTEROL SULFATE 3 ML: .5; 3 SOLUTION RESPIRATORY (INHALATION) at 22:41

## 2018-01-01 RX ADMIN — IPRATROPIUM BROMIDE AND ALBUTEROL SULFATE 3 ML: .5; 3 SOLUTION RESPIRATORY (INHALATION) at 11:08

## 2018-01-01 RX ADMIN — PHENYLEPHRINE HYDROCHLORIDE 100 MCG/MIN: 10 INJECTION INTRAVENOUS at 17:40

## 2018-01-01 RX ADMIN — IPRATROPIUM BROMIDE AND ALBUTEROL SULFATE 3 ML: .5; 3 SOLUTION RESPIRATORY (INHALATION) at 06:34

## 2018-01-01 RX ADMIN — FENTANYL CITRATE 50 MCG/HR: 50 INJECTION, SOLUTION INTRAMUSCULAR; INTRAVENOUS at 03:27

## 2018-01-01 RX ADMIN — IPRATROPIUM BROMIDE AND ALBUTEROL SULFATE 3 ML: .5; 3 SOLUTION RESPIRATORY (INHALATION) at 06:43

## 2018-01-01 RX ADMIN — FUROSEMIDE 40 MG: 10 INJECTION, SOLUTION INTRAMUSCULAR; INTRAVENOUS at 08:32

## 2018-01-01 RX ADMIN — SODIUM CHLORIDE: 9 INJECTION, SOLUTION INTRAVENOUS at 01:10

## 2018-01-01 RX ADMIN — IPRATROPIUM BROMIDE AND ALBUTEROL SULFATE 3 ML: .5; 3 SOLUTION RESPIRATORY (INHALATION) at 02:27

## 2018-01-01 RX ADMIN — IPRATROPIUM BROMIDE AND ALBUTEROL SULFATE 3 ML: .5; 3 SOLUTION RESPIRATORY (INHALATION) at 22:57

## 2018-01-01 RX ADMIN — AMPICILLIN SODIUM AND SULBACTAM SODIUM 3 G: 2; 1 INJECTION, POWDER, FOR SOLUTION INTRAMUSCULAR; INTRAVENOUS at 11:35

## 2018-01-01 RX ADMIN — STANDARDIZED SENNA CONCENTRATE AND DOCUSATE SODIUM 2 TABLET: 8.6; 5 TABLET, FILM COATED ORAL at 09:49

## 2018-01-01 RX ADMIN — HEPARIN SODIUM 3300 UNITS: 1000 INJECTION, SOLUTION INTRAVENOUS; SUBCUTANEOUS at 12:35

## 2018-01-01 RX ADMIN — DEXTROSE MONOHYDRATE 25 ML: 25 INJECTION, SOLUTION INTRAVENOUS at 00:05

## 2018-01-01 RX ADMIN — SODIUM CHLORIDE SOLN NEBU 3% 3 ML: 3 NEBU SOLN at 19:18

## 2018-01-01 RX ADMIN — WHITE PETROLATUM 1 APPLICATION: .15; .85 OINTMENT OPHTHALMIC at 22:00

## 2018-01-01 RX ADMIN — HEPARIN SODIUM 3300 UNITS: 1000 INJECTION, SOLUTION INTRAVENOUS; SUBCUTANEOUS at 18:31

## 2018-01-01 RX ADMIN — MORPHINE SULFATE 4 MG: 4 INJECTION INTRAVENOUS at 17:42

## 2018-01-01 RX ADMIN — IPRATROPIUM BROMIDE AND ALBUTEROL SULFATE 3 ML: .5; 3 SOLUTION RESPIRATORY (INHALATION) at 22:03

## 2018-01-01 RX ADMIN — ALBUMIN (HUMAN) 12.5 G: 0.25 INJECTION, SOLUTION INTRAVENOUS at 11:15

## 2018-01-01 RX ADMIN — AMIODARONE HYDROCHLORIDE 1 MG/MIN: 50 INJECTION, SOLUTION INTRAVENOUS at 19:00

## 2018-01-01 RX ADMIN — FAMOTIDINE 20 MG: 10 INJECTION, SOLUTION INTRAVENOUS at 05:10

## 2018-01-01 RX ADMIN — ATORVASTATIN CALCIUM 80 MG: 20 TABLET, FILM COATED ORAL at 09:40

## 2018-01-01 RX ADMIN — AMPICILLIN SODIUM AND SULBACTAM SODIUM 3 G: 2; 1 INJECTION, POWDER, FOR SOLUTION INTRAMUSCULAR; INTRAVENOUS at 06:00

## 2018-01-01 RX ADMIN — SODIUM CHLORIDE SOLN NEBU 3% 3 ML: 3 NEBU SOLN at 15:32

## 2018-01-01 RX ADMIN — ALBUMIN (HUMAN) 12.5 G: 0.25 INJECTION, SOLUTION INTRAVENOUS at 15:40

## 2018-01-01 RX ADMIN — PROPOFOL 10 MCG/KG/MIN: 10 INJECTION, EMULSION INTRAVENOUS at 01:50

## 2018-01-01 RX ADMIN — IPRATROPIUM BROMIDE AND ALBUTEROL SULFATE 3 ML: .5; 3 SOLUTION RESPIRATORY (INHALATION) at 22:14

## 2018-01-01 RX ADMIN — NOREPINEPHRINE BITARTRATE 3 MCG/MIN: 1 INJECTION INTRAVENOUS at 04:48

## 2018-01-01 RX ADMIN — SODIUM CHLORIDE SOLN NEBU 3% 3 ML: 3 NEBU SOLN at 19:03

## 2018-01-01 RX ADMIN — SODIUM CHLORIDE SOLN NEBU 3% 3 ML: 3 NEBU SOLN at 07:03

## 2018-01-01 RX ADMIN — IPRATROPIUM BROMIDE AND ALBUTEROL SULFATE 3 ML: .5; 3 SOLUTION RESPIRATORY (INHALATION) at 10:00

## 2018-01-01 RX ADMIN — AMIODARONE HYDROCHLORIDE 0.5 MG/MIN: 50 INJECTION, SOLUTION INTRAVENOUS at 09:08

## 2018-01-01 RX ADMIN — IPRATROPIUM BROMIDE AND ALBUTEROL SULFATE 3 ML: .5; 3 SOLUTION RESPIRATORY (INHALATION) at 18:10

## 2018-01-01 RX ADMIN — IPRATROPIUM BROMIDE AND ALBUTEROL SULFATE 3 ML: .5; 3 SOLUTION RESPIRATORY (INHALATION) at 07:50

## 2018-01-01 RX ADMIN — IPRATROPIUM BROMIDE AND ALBUTEROL SULFATE 3 ML: .5; 3 SOLUTION RESPIRATORY (INHALATION) at 02:56

## 2018-01-01 ASSESSMENT — COGNITIVE AND FUNCTIONAL STATUS - GENERAL
DRESSING REGULAR UPPER BODY CLOTHING: TOTAL
HELP NEEDED FOR BATHING: TOTAL
MOBILITY SCORE: 24
SUGGESTED CMS G CODE MODIFIER DAILY ACTIVITY: CN
SUGGESTED CMS G CODE MODIFIER MOBILITY: CH
PERSONAL GROOMING: TOTAL
TOILETING: TOTAL
DAILY ACTIVITIY SCORE: 6
DRESSING REGULAR LOWER BODY CLOTHING: TOTAL
EATING MEALS: TOTAL

## 2018-01-01 ASSESSMENT — LIFESTYLE VARIABLES: EVER_SMOKED: UNABLE TO EVALUATE AT THIS TIME - NEEDS ASSESSMENT PRIOR TO DISCHARGE

## 2018-01-01 ASSESSMENT — PULMONARY FUNCTION TESTS
FVC: 500
FVC: .6

## 2018-01-01 ASSESSMENT — PATIENT HEALTH QUESTIONNAIRE - PHQ9
2. FEELING DOWN, DEPRESSED, IRRITABLE, OR HOPELESS: NOT AT ALL
SUM OF ALL RESPONSES TO PHQ9 QUESTIONS 1 AND 2: 0
2. FEELING DOWN, DEPRESSED, IRRITABLE, OR HOPELESS: NOT AT ALL
1. LITTLE INTEREST OR PLEASURE IN DOING THINGS: NOT AT ALL
2. FEELING DOWN, DEPRESSED, IRRITABLE, OR HOPELESS: NOT AT ALL
SUM OF ALL RESPONSES TO PHQ9 QUESTIONS 1 AND 2: 0
SUM OF ALL RESPONSES TO PHQ9 QUESTIONS 1 AND 2: 0
1. LITTLE INTEREST OR PLEASURE IN DOING THINGS: NOT AT ALL
SUM OF ALL RESPONSES TO PHQ9 QUESTIONS 1 AND 2: 0
1. LITTLE INTEREST OR PLEASURE IN DOING THINGS: NOT AT ALL
2. FEELING DOWN, DEPRESSED, IRRITABLE, OR HOPELESS: NOT AT ALL
2. FEELING DOWN, DEPRESSED, IRRITABLE, OR HOPELESS: NOT AT ALL
1. LITTLE INTEREST OR PLEASURE IN DOING THINGS: NOT AT ALL
SUM OF ALL RESPONSES TO PHQ9 QUESTIONS 1 AND 2: 0
1. LITTLE INTEREST OR PLEASURE IN DOING THINGS: NOT AT ALL

## 2018-01-01 ASSESSMENT — PAIN SCALES - WONG BAKER
WONGBAKER_NUMERICALRESPONSE: DOESN'T HURT AT ALL

## 2018-01-01 ASSESSMENT — PAIN SCALES - GENERAL
PAINLEVEL_OUTOF10: 0

## 2018-06-30 PROBLEM — J96.01 ACUTE HYPOXEMIC RESPIRATORY FAILURE (HCC): Status: ACTIVE | Noted: 2018-01-01

## 2018-06-30 PROBLEM — I95.9 HYPOTENSION: Status: ACTIVE | Noted: 2018-01-01

## 2018-06-30 PROBLEM — R57.0 CARDIOGENIC SHOCK (HCC): Status: ACTIVE | Noted: 2018-01-01

## 2018-06-30 PROBLEM — I25.5 ISCHEMIC CARDIOMYOPATHY: Status: ACTIVE | Noted: 2018-01-01

## 2018-06-30 PROBLEM — I46.9 CARDIAC ARREST (HCC): Status: ACTIVE | Noted: 2018-01-01

## 2018-06-30 PROBLEM — I48.91 ATRIAL FIBRILLATION (HCC): Status: ACTIVE | Noted: 2018-01-01

## 2018-06-30 NOTE — PROCEDURES
Date of service:  6/30/2018    Title:  Arterial catheter placement    Indication:  Hypotension    Narrative:    The right groin was prepped with chlorhexidine and draped in the usual sterile fashion.  A 20 gauge arterial catheter was placed into the right femoral artery using the technique described by Jules without difficulty or apparent complication.  The line was sutured into place and a sterile dressing was placed over the line.  An outstanding arterial waveform is present on the monitor.  The patient tolerated the procedure quite nicely.  No complications were apparent.

## 2018-06-30 NOTE — ASSESSMENT & PLAN NOTE
s/p out of hospital arrest with ROSC  s/p amiodarone drip now transitioned to cortrak   Cardiology consulted.  Severe cardiomyopathy  His condition is consistent with anoxic brain injury.   Neurology consult on 7/7.

## 2018-06-30 NOTE — PROCEDURES
Date of service:  6/30/2018    Title:  Central venous catheter placement - internal jugular vein    Indication:  Hypotension    Narrative:    The right neck was prepped with chlorhexidine and draped in the usual sterile fashion.  1% Xylocaine solution was used for topical anesthesia.  A triple lumen central venous catheter was placed into the right internal jugular vein under ultrasound guidance using the technique described by Jules without difficulty or apparent complication.  The line was sutured into place and a sterile dressing was placed over the line.  All 3 ports flush and return venous blood easily.  The patient tolerated the procedure quite nicely.  No complications are apparent.  A STAT CXR is ordered to confirm placement.

## 2018-06-30 NOTE — PROGRESS NOTES
[facility] Hospitalist Progress Note    Date of Service: 2018    Chief Complaint  67 y.o. male admitted 2018 with cardiac arrest     Interval Problem Update    Vasopressin 0.03 levo 20 briana 110 amio 0.5  Fentanyl and prop drips  On cooling protocol  PER but sluggish   NPO with output from OG  Low UO  On insulin drip  VD#1  INR 2.5   On unasyn           Consultants/Specialty  Critical care  Cardiology  Palliative care    Disposition  To be determined        Review of Systems   Unable to perform ROS: Intubated      Physical Exam  Laboratory/Imaging   Hemodynamics  Temp (24hrs), Av.2 °C (97.1 °F), Min:34.9 °C (94.8 °F), Max:37 °C (98.6 °F)   Temperature: (!) 35.5 °C (95.9 °F), Monitored Temp: 35.6 °C (96.1 °F)  Pulse  Av.5  Min: 36  Max: 84 Heart Rate (Monitored): (!) 47  Blood Pressure : 118/67, Arterial BP: 119/55, NIBP: 129/54 CVP (mm Hg): (!) 300 MM HG    Respiratory  Gorman Vent Mode: APVCMV, Rate (breaths/min): 24, PEEP/CPAP: 10, PEEP/CPAP: 10, FiO2: 80, P Peak (PIP): 28, P MEAN: 17   Respiration: (!) 24, Pulse Oximetry: 100 %     Work Of Breathing / Effort: Vented  RUL Breath Sounds: Diminished, RML Breath Sounds: Diminished, RLL Breath Sounds: Diminished, KE Breath Sounds: Diminished, LLL Breath Sounds: Diminished    Fluids    Intake/Output Summary (Last 24 hours) at 18 1018  Last data filed at 18 0800   Gross per 24 hour   Intake          1055.79 ml   Output              185 ml   Net           870.79 ml       Nutrition  Orders Placed This Encounter   Procedures   • Diet NPO     Standing Status:   Standing     Number of Occurrences:   1     Order Specific Question:   Type:     Answer:   Now [1]     Order Specific Question:   Restrict to:     Answer:   Strict [1]     Physical Exam   Constitutional: He appears well-developed.   HENT:   Head: Normocephalic.   Right Ear: External ear normal.   Left Ear: External ear normal.   Eyes: Conjunctivae are normal. Right eye exhibits no  discharge. Left eye exhibits no discharge.   Pupils equal and sluggish reaction to light   Neck: No JVD present. No tracheal deviation present.   Cardiovascular: Regular rhythm.  Bradycardia present.    No murmur heard.  Pulmonary/Chest: He has rales. He exhibits no tenderness.   Intubated   Abdominal: Soft. Bowel sounds are normal. He exhibits no distension. There is no tenderness.   Neurological:   Does not withdraw to pain  Pupils equal but sluggish     Skin: Skin is warm and dry. He is not diaphoretic. No cyanosis.       Recent Labs      06/29/18 2127 06/30/18 0230 06/30/18 0630   WBC  16.8*  16.9*  16.0*   RBC  4.42*  4.48*  4.48*   HEMOGLOBIN  11.3*  11.1*  11.2*   HEMATOCRIT  38.3*  38.0*  38.1*   MCV  86.7  84.8  85.0   MCH  25.6*  24.8*  25.0*   MCHC  29.5*  29.2*  29.4*   RDW  53.0*  52.4*  51.2*   PLATELETCT  205  189  176   MPV  10.7  10.2  9.9     Recent Labs      06/29/18 2127 06/30/18 0230  06/30/18   0630   SODIUM  141  142  142   POTASSIUM  4.0  3.8  4.3   CHLORIDE  107  107  106   CO2  22  27  27   GLUCOSE  218*  298*  291*   BUN  40*  44*  46*   CREATININE  2.65*  2.86*  2.96*   CALCIUM  7.5*  7.2*  7.2*     Recent Labs      06/29/18 2127 06/30/18 0230  06/30/18 0630   APTT  35.0  36.1*  35.0   INR  2.15*  2.49*  2.41*     Recent Labs      06/29/18 2127   BNPBTYPENAT  338*     Recent Labs      06/30/18   0230   TRIGLYCERIDE  132          Assessment/Plan     * Cardiac arrest (HCC)   Assessment & Plan    V. fib arrest?   AICD interrogation  On amiodarone drip per cardiology  On cooling protocol  Concern for possible anoxic encephalopathy continue close clinical monitoring and further evaluation depending on neurologic exam over the next 48 hours        Hypotension   Assessment & Plan    Cardiogenic shock    Wean off pressors as tolerated        Anemia- (present on admission)   Assessment & Plan    Stable with no signs of bleeding monitor        CKD (chronic kidney disease)  stage 3, GFR 30-59 ml/min- (present on admission)   Assessment & Plan    Monitor renal function        Ischemic cardiomyopathy   Assessment & Plan    EF 20%  Cardiology following        Atrial fibrillation (HCC)   Assessment & Plan    Paroxysmal    On warfarin being held today as he is on cooling protocol  Recheck INR in a.m. and adjust accordingly        Type 2 diabetes mellitus (HCC)- (present on admission)   Assessment & Plan    Uncontrolled with hyperglycemia   on insulin drip monitor blood sugars and adjust accordingly        Hyperlipidemia- (present on admission)   Assessment & Plan    Continue atorvastatin        Circulating anticoagulants (HCC)- (present on admission)   Assessment & Plan    Warfarin being held today as he is on cooling protocol  Recheck INR in a.m. and treat accordingly          Quality-Core Measures   Reviewed items::  Labs reviewed, Medications reviewed and Radiology images reviewed  Pedraza catheter::  Critically Ill - Requiring Accurate Measurement of Urinary Output and Unconscious / Sedated Patient on a Ventilator  Central line in place:  Shock  DVT prophylaxis pharmacological::  Warfarin (Coumadin)  Antibiotics:  Treating active infection/contamination beyond 24 hours perioperative coverage

## 2018-06-30 NOTE — PROGRESS NOTES
Dr. IRVING updated. Titrate pain mediation and see if effect on BIS monitor. Acknowledged concerns. Page cardiology after EKG for updated regarding amio drip.

## 2018-06-30 NOTE — PROGRESS NOTES
Lab called with critical result of calcium at 1330. Critical lab result read back to lab.   Dr. Ethan Lauren notified of critical lab result at 1345.  Critical lab result read back by Dr. Ethan Lauren.    Order to draw ionized calcium at 1830 with BMP. Will place order

## 2018-06-30 NOTE — ED NOTES
PT LOSS PLUSES AT 2146, CPR WAS STARTED.   PT WAS GIVE EPI AT 2148 2148 PLUSES CHECK  BICARI: 2150  PLUSES CHECK 2150  EPI 2151  PLUSES AT 2152. .

## 2018-06-30 NOTE — CONSULTS
DATE OF SERVICE:  06/30/2018    The patient was seen in [facility] after initially being brought up to the [facility]   from [id number].    CHIEF COMPLAINT:  Status post cardiopulmonary arrest with cardiopulmonary   resuscitation.    HISTORY OF PRESENT ILLNESS:  This is a 67-year-old male with a known history   of diabetes, COPD, hypertension, 3-vessel coronary artery disease with a LIMA   to the LAD, SVGs to obtuse marginal and PDA in 04/2001, atrial fibrillation,   chronic kidney disease, ischemic cardiomyopathy with AICD pacer that presented   in cardiac arrest.  Patient's history was obtained from the family as well as   Dr. Mcdonnell from the emergency department.  The patient according to the son   normally [place] in the [place] and was traveling to [place] to see his family.    He saw his son and then after dinner the patient wanted to go gambling and   once the son parked the car, he found that his dad had already passed out and   had a cardiopulmonary arrest.  He feels that they ate dinner at approximately   05:00-06:00 p.m. and then the visitation with the grandkids and [facility] 08:00 and 08:30 p.m.  The patient did have a bystander   CPR, although AICD was then placed and it suggested being shocked.  He was   given at least 2 rounds of epinephrine and Narcan en route.  At least one AED   shock was given.  He did develop a pulse.  While in the emergency department,   he was intubated and a femoral line was placed.  I believe he may have had a   left IO line placed during the arrest.  The patient is on Coumadin for his   underlying AICD pacer AFib and according to the son, he has normally taking   his anticoagulant medications routinely.  He states now that he has had at   least 1 other bout of CPR in 10/2017 in [place] where the AICD was placed for   ventricular fibrillation arrest and he did have his CABG performed there as   well.  The patient unfortunately has a poor neurologic exam with no  sedation   currently being given and some minor myoclonic activity versus slight extensor   posturing.  He does not breathe above the ventilator, but does have   withdrawal to painful stimulus.  This is prior to any sedation on-board after   he was intubated with rapid sequence technique.    The family does state that he does not want vegetative state and long-term   care, but they are agreeable to the code chill process and the patient does   meet criteria except for slightly elevated INR, which is felt to be okay for   his level, which was 2.15.  Targeted temperature of 36 degrees  is being recommended by cardiology, and the patient was seen by   Dr. Tovar in the ER with cardiology service as   well.  He is not planning on heart catheterization at this time and he felt   that the patient may benefit from bicarbonate infusion and will be continuing   with amiodarone infusion to prevent further V-fib arrest.  The patient did   have a CT of the brain, which only showed sinusitis, otherwise was negative   without hemorrhage.  No other obvious traumatic injuries noted.  The patient's   echo does reveal ongoing cardiomyopathy, EF of around 20% back in 10/2017 and   his right ventricular systolic pressure was elevated at 50.    Critical care was asked to see and manage this critically ill patient.     ALLERGIES:  THE PATIENT'S LISTED OUTPATIENT ALLERGIES ARE CEPHALEXIN AND   DIFLUCAN.    MEDICATIONS:  1.  Ferrous sulfate.  2.  Lasix.  3.  Potassium.  4.  Imdur.  5.  Atorvastatin.  6.  Carvedilol.  7.  Hydralazine.  8.  Warfarin.  9.  Aspirin.  10.  NPH regular and does take inhaled high dose sodium chloride by   nebulization if he is short of breath.    PAST MEDICAL HISTORY:  Includes diabetes, hypertension, coronary artery   disease, heart failure with EF of 20% with history of CABG, previous cardiac   arrest x2 with AICD pacer in place, hyperlipidemia and osteoarthritis.    PAST SURGICAL HISTORY:  Coronary artery  bypass grafting as described above,   which includes a 3-vessel CABG, LIMA to LAD, saphenous vein graft to PDA, in   04/2001.    SOCIAL HISTORY:  Largely unknown, but he does have a former cigarette smoking   history, quit in 1997.  No alcohol or substance abuse is reported.  He is   , has children and grandchildren in the [place] area.  He lives in the [facility].    REVIEW OF SYSTEMS:  Unobtainable due to his intubated status, but I did review   records from cardiology and ER as well.    PHYSICAL EXAMINATION:  INITIAL VITAL SIGNS:  Blood pressure was 106/57, pulse of 75, temperature was   97.9 with respirations 18, and saturation 98%.  GENERAL:  Initially, the patient had a Pierce airway, which was converted to an   endotracheal tube.  HEENT:  His head showed no obvious signs of trauma.  His eyes show he has   corneal arcus.  I did not get an oculocephalic reaction or corneals.  He has   pupils that are around 3 mm and nonreactive.  He has minimal-to-no gag reflex   with suctioning.  No epistaxis is noted.  He has a gastric tube in place.  NECK:  Showed no obvious adenopathy.  There was mild JVD.  The patient does   have a palpable pacemaker at the left anterior chest wall pocket.  LUNGS:  Revealed breath sounds bilaterally with inspiratory rhonchi.  HEART:  Irregular rhythm with occasional pauses.  He had S3 and 1+ edema and there were diminished   pulses at the dorsalis pedis and posterior tibia.  ABDOMEN:  Soft.  No rebound, rigidity or guarding.  No hepatosplenomegaly.  EXTREMITIES:  No clubbing or cyanosis.  He did have slightly cool extremities   with the [place] pads in place.  He does have signs of peripheral vascular   disease noted with chronic venous stasis changes of the lower extremity as   well as weak pulses.  The patient does have Charcot joints consistent with his   longstanding diabetes most likely.  NEUROLOGIC:  He does have currently on neurologic exam, please see HEENT   above.  In  addition, he does have flat DTRs.  He does not breathe above the   ventilator setting and does not have oculocephalics or corneals and only trace   gag with the ET tube suctioning even after transferred to the ICU.  There are   scars from previous saphenous vein graft harvesting.    LABORATORY DATA:  White cell count was 16.8, H and H is 11.3 and 38.3 with a   platelet count of 205, MCV is 86.  His chemistry, sodium 141, potassium 4,   chloride 107, CO2 is 22 with a glucose of 218, BUN of 40, creatinine of 2.65,   calcium of 7.5, and lactate 4.3.  Troponin was 0.24 with a BNP of 338.  His   Accu-Chek was 199.  INR 2.15, PTT is 35 with a PT of 24.  Arterial blood gas   showed 7.24, pCO2 of 54 with a PaO2 of 69, bicarbonate of 23 with a sat of   90%.    He did have a CT of the brain that showed no obvious acute abnormality except   for chronic sinusitis of right frontal, maxillary and left sphenoid.    Chest x-ray is not available to us as he was at outlying institution, but a   repeat is pending.  ECG at our institution was at 2108 hours, sinus   tachycardia with a rate of 74, ND interval of 278, QRS duration of 96, QTC of   453, and axis of 100.  There was some ectopy with ventricular beats and there   also appeared to be a pause after the ventricular rhythm.  There was slight ST   depression noted in lead III and aVF as well as lead V and VI.  Poor R-wave   progression is noted, old anterior infarct and lateral infarct are certainly   possible.  He does have a first-degree AV block, ND interval again was 278.    IMPRESSION:  1.  Status post ventricular fibrillation arrest with minimal downtime with 2   rounds of epinephrine and cardiopulmonary resuscitation as well as 1 shock and   a dose of Narcan without significant improvement.  2.  Known ischemic cardiomyopathy with the ejection fraction of 20%.  3.  History of automated implantable cardioverter-defibrillator and possible   malfunction of the automated  implantable cardioverter-defibrillator.  The   patient did have [place]un valve in the past.  4.  Cardiogenic shock.  5.  Severe lactic acidosis.  6.  Acute renal failure with kidney injury.  7.  Status post coronary artery bypass grafting from left internal mammary   artery to left anterior descending artery and obtuse marginal posterior   descending artery from saphenous vein graft in 2001.  8.  Atrial fibrillation with Coumadin use.  9.  Diabetes mellitus with Charcot joint and poor diabetes management control.  10.  Mild coagulopathy, on Coumadin.    PLAN:  1.  The patient to be treated with a code chill.  2.  Would utilize a typical protocol with targeted temperature at this time of   36 degrees.  3.  Continue with amiodarone infusion has been started.  4.  Norepinephrine for his shock.  5.  Would avoid epinephrine, which may precipitate further dysrhythmia.  6.  Possible need for heparin infusion once INR is below 2.  7.  Aspirin daily.  8.  Sedation to prevent shivering, which includes propofol, fentanyl infusion   and p.r.n. paralytics.  9.  Probable MRI required and/or withdrawal of care pending his results of   post-cooling as it is unclear that he does have severe anoxia yet at this   time.  10.  Cardiology consultation appreciated.  11.  Echocardiogram.  12.  Follow troponins and lactic acid.  13.  A stat portable chest x-ray to evaluate ET tube placement as well as what   appears to be probable aspiration event.  14.  Bicarbonate only as necessary.  15.  Continue with femoral central catheter placed in the ED.  16.  Advanced directive as discussed with family member, the patient's son at   bedside of which will ask for palliative care assistance since there is a DO   NOT RESUSCITATE order from previous though the son stated that there have been   multiple conversations with family and the patient that he would not want   vegetative state, but to be kept alive if there is recoverable status    neurologically.      I did discuss with the patient's family as well as nursing   at bedside and charge nurse.    Critical care time so far is 65 minutes not including procedures.       ____________________________________     DO MARK Sparrow / RITESH    DD:  06/30/2018 02:24:08  DT:  06/30/2018 03:33:03    D#:  [id number]  Job#:  [id number]

## 2018-06-30 NOTE — PROGRESS NOTES
Pt remains hypotensive after the addition of Vasopressin.  Dr. Hamilton on the unit, Maximo gtt ordered.

## 2018-06-30 NOTE — CARE PLAN
Problem: Venous Thromboembolism (VTW)/Deep Vein Thrombosis (DVT) Prevention:  Goal: Patient will participate in Venous Thrombosis (VTE)/Deep Vein Thrombosis (DVT)Prevention Measures  Outcome: PROGRESSING AS EXPECTED  Elevated INR 2.55.     Problem: Bowel/Gastric:  Goal: Normal bowel function is maintained or improved  Outcome: PROGRESSING AS EXPECTED  Code chill protocol. OG tube to LCS, pt had 2 BM while sedated.     Problem: Hemodynamic Status  Goal: Vital Signs and Fluid Balance Management  Outcome: PROGRESSING SLOWER THAN EXPECTED  Pt noted EF of 20%  IVF at 100  Levophed at 15mcg/min  Phenelephrine 55 mcg/min  Vasopression at 0.03 U/hr     UOP minimal 15-30cc/2 hours  Noted gen edema 1+  Intervention: Cardiac Monitoring, Pulse Oximetry  Paced at 40  Intervention: Hemodynamic Monitoring  Artline and CVP noted.   Intervention: Monitor I & O, Manage IV fluids and IV infusions  See I&O flow sheet   Intervention: Daily Weights  Per bed scale, increased  Intervention: Assess peripheral pulses and capillary refill  Cool extremities, pulses are dopplerable  Intervention: Assess Color and Body Temperature  Skin color appropriate for ethnicity.

## 2018-06-30 NOTE — PROGRESS NOTES
Dr. Tovar of cardiology and Dr Ann of pulmonology both aware of INR 2.15 and wish to proceed with TTM.  Chriss Ware applied and started at 0135.

## 2018-06-30 NOTE — ED NOTES
LAB WAS CALLED TO ADD PT/INR ON TO LABS . PT HAS  A BED, FLOOR RN IS AT BEDSIDE.REPORT WAS GIVEN . PT LABS WERE REVIEWED. CODE CHILL WILL BE STARTED.

## 2018-06-30 NOTE — ED NOTES
Med rec updated per pt's [facility] records  Unable to interview pt or family at this time   Pt's warfarin dosing updated per [facility]'s coumadin clinic visit for pt on 6/19/18

## 2018-06-30 NOTE — CARE PLAN
Problem: Ventilation Defect:  Goal: Ability to achieve and maintain unassisted ventilation or tolerate decreased levels of ventilator support  Outcome: PROGRESSING AS EXPECTED  Adult Ventilation Update    Total Vent Days: 2  RR: 24 VT: 440 PEEP: 10 FiO2: 70%    Patient Lines/Drains/Airways Status    Active Airway     Name: Placement date: Placement time: Site: Days:    Airway Group ET Tube Oral 8.0 06/29/18 2131   Oral   2               Plateau Pressure (Q Shift): 27 (06/30/18 0755)  Static Compliance (ml / cm H2O): 27.2 (06/30/18 1439)    Sputum/Suction   Cough:  (No indication for suction at this time) (06/30/18 1439)  Sputum Amount: Unable to Evaluate (06/30/18 1200)  Sputum Color: Unable to Evaluate (06/30/18 1200)  Sputum Consistency: Unable to Evaluate (06/30/18 1200)    Events/Summary/Plan: FiO2 titrated to 70%. No other vent changes made at this time (06/30/18 1439)

## 2018-06-30 NOTE — H&P
Hospital Medicine History & Physical Note    Date of Service  6/30/2018    Primary Care Physician  Pcp Pt States None    Consultants  Cardiology, intensive care    Code Status  Full code    Chief Complaint  Cardiac arrest    History of Presenting Illness  67 y.o. male with history of chronic kidney disease which is stable, diabetes mellitus on insulin regimen, and essential hypertension controlled, was in his usual state of health until the day prior to admission. While with his family, he went to dinner and then several hours later a casino. It was in route to the casino, that he collapsed, and was found pulseless by his son. CPR was begun, and emergency medical services arrived, and the patient did receive CPR as well as shock therapy. He was subsequently brought to this facility for further treatment and evaluation, and was placed on the [facility] till protocol.    Review of Systems  Review of Systems   Unable to perform ROS: Intubated       Past Medical History   has a past medical history of Cardiomyopathy (HCC); Diabetes (HCC); and Hypertension.    Surgical History  Past surgical history could not be obtained, due to intubation    Family History  Unable to obtain due to intubation and cardiac arrest    Social History   reports that he quit smoking about 20 years ago. His smoking use included Cigarettes. He has never used smokeless tobacco. He reports that he does not drink alcohol.    Allergies  Allergies   Allergen Reactions   • Cephalexin    • Fluconazole        Medications  Prior to Admission Medications   Prescriptions Last Dose Informant Patient Reported? Taking?   aspirin 81 MG tablet unknown at unknown Patient's Home Pharmacy Yes No   Sig: Take 81 mg by mouth every morning.   atorvastatin (LIPITOR) 40 MG Tab unknown at unknown Patient's Home Pharmacy Yes No   Sig: Take 40 mg by mouth every evening.   betamethasone dipropionate (DIPROLENE) 0.05 % Cream unknown at unknown Patient's Home Pharmacy Yes Yes    Sig: Apply 1 Application to affected area(s) 2 times a day. Left lower leg   carvedilol (COREG) 25 MG Tab unknown at unknown Patient's Home Pharmacy Yes No   Sig: Take 25 mg by mouth 2 times a day, with meals.   docusate sodium (COLACE) 100 MG Cap unknown at unknown Patient's Home Pharmacy Yes Yes   Sig: Take 100-200 mg by mouth at bedtime as needed for Constipation.   ferrous gluconate (FERGON) 324 (38 Fe) MG Tab unknown at unknown Patient's Home Pharmacy Yes No   Sig: Take 324 mg by mouth every day.   furosemide (LASIX) 40 MG Tab unknown at unknown Patient's Home Pharmacy Yes Yes   Sig: Take 40-80 mg by mouth 2 Times a Day. 40mg in AM, 80mg in afternoon   gabapentin (NEURONTIN) 100 MG Cap unknown at unknown Patient's Home Pharmacy Yes Yes   Sig: Take 100 mg by mouth 3 times a day.   hydrALAZINE (APRESOLINE) 25 MG Tab unknown at unknown Patient's Home Pharmacy Yes No   Sig: Take 12.5 mg by mouth 2 Times a Day.   insulin NPH (HUMULIN,NOVOLIN) 100 UNIT/ML Suspension unknown at unknown Patient's Home Pharmacy Yes No   Sig: Inject 20-40 Units as instructed 2 times a day, with meals. Pt mixes with Humulin R BID with meals  40 units in the morning, 20 units in the evening   insulin regular (HUMULIN R) 100 Unit/mL Solution unknown at unknown Patient's Home Pharmacy Yes No   Sig: Inject 8-20 Units as instructed 3 times a day before meals. Pt mixes with Humulin N for breakfast and dinner, then uses alone at lunchtime  16-22 units at breakfast  8-16 units at lunch  16-20 units at dinner  Depending on blood sugar/what pt eats   isosorbide mononitrate SR (IMDUR) 30 MG TABLET SR 24 HR unknown at unknown Patient's Home Pharmacy Yes No   Sig: Take 30 mg by mouth every morning.   omeprazole (PRILOSEC) 20 MG delayed-release capsule unknown at unknown Patient's Home Pharmacy Yes Yes   Sig: Take 20 mg by mouth every day.   potassium chloride SA (KDUR) 20 MEQ Tab CR unknown at unknown Patient's Home Pharmacy No No   Sig: Take 1 Tab  by mouth every day.   terbinafine (LAMISIL) 1 % cream unknown at unknown Patient's Home Pharmacy Yes Yes   Sig: Apply 1 Application to affected area(s) 2 times a day.   tizanidine (ZANAFLEX) 4 MG Tab unknown at unknown Patient's Home Pharmacy Yes Yes   Sig: Take 4 mg by mouth every bedtime.   vitamin D (CHOLECALCIFEROL) 1000 UNIT Tab unknown at unknown Patient's Home Pharmacy Yes Yes   Sig: Take 1,000 Units by mouth every day.   warfarin (COUMADIN) 2 MG Tab unknown at unknown Patient's Home Pharmacy Yes No   Sig: Take 2-3 mg by mouth every evening. 3mg on Sundays, 2mg on all other days  Per [facility] 6/19/18      Facility-Administered Medications: None       Physical Exam  Blood Pressure : 118/67   Temperature: (!) 34.9 °C (94.8 °F)   Pulse: (!) 49   Respiration: (!) 24   Pulse Oximetry: 99 %     Physical Exam   Constitutional: He appears well-developed and well-nourished. No distress.   HENT:   Head: Normocephalic and atraumatic.   Eyes: Conjunctivae are normal. Pupils are equal, round, and reactive to light.   Neck: Normal range of motion. Neck supple.   Cardiovascular: Normal rate, regular rhythm and normal heart sounds.  Exam reveals no gallop and no friction rub.    No murmur heard.  Pulmonary/Chest: Effort normal and breath sounds normal. No respiratory distress. He has no wheezes.   Abdominal: Soft. Bowel sounds are normal. He exhibits no distension and no mass. There is no tenderness. There is no rebound and no guarding.   Musculoskeletal: Normal range of motion. He exhibits edema.   Neurological: No cranial nerve deficit.   Intubated and sedated.  Unclear neurologic exam given prior therapy for intubation, and current arctic chill protocol    Skin: He is not diaphoretic.   Nursing note and vitals reviewed.      Laboratory:  Recent Labs      06/29/18   2127  06/30/18   0230  06/30/18   0630   WBC  16.8*  16.9*  16.0*   RBC  4.42*  4.48*  4.48*   HEMOGLOBIN  11.3*  11.1*  11.2*   HEMATOCRIT   38.3*  38.0*  38.1*   MCV  86.7  84.8  85.0   MCH  25.6*  24.8*  25.0*   MCHC  29.5*  29.2*  29.4*   RDW  53.0*  52.4*  51.2*   PLATELETCT  205  189  176   MPV  10.7  10.2  9.9     Recent Labs      06/29/18 2127 06/30/18   0230  06/30/18   0630   SODIUM  141  142  142   POTASSIUM  4.0  3.8  4.3   CHLORIDE  107  107  106   CO2  22  27  27   GLUCOSE  218*  298*  291*   BUN  40*  44*  46*   CREATININE  2.65*  2.86*  2.96*   CALCIUM  7.5*  7.2*  7.2*     Recent Labs      06/29/18 2127 06/30/18   0230  06/30/18   0630   GLUCOSE  218*  298*  291*     Recent Labs      06/29/18 2127 06/30/18   0230  06/30/18   0630   APTT  35.0  36.1*  35.0   INR  2.15*  2.49*  2.41*     Recent Labs      06/29/18 2127   BNPBTYPENAT  338*     Recent Labs      06/30/18   0230   TRIGLYCERIDE  132     Lab Results   Component Value Date    TROPONINI 3.29 (H) 06/30/2018       Urinalysis:    No results found for: SPECGRAVITY, GLUCOSEUR, KETONES, NITRITE, WBCURINE, RBCURINE, BACTERIA, EPITHELCELL     Imaging:  DX-CHEST-PORTABLE (1 VIEW)   Final Result         1.  Pulmonary edema and/or infiltrates are identified, which are stable since the prior exam.   2.  Cardiomegaly   3.  Atherosclerosis      DX-ABDOMEN FOR TUBE PLACEMENT   Final Result         1.  Nonspecific bowel gas pattern.   2.  Orogastric tube tip terminates overlying the expected location of the pylorus or first duodenal segment.      DX-CHEST-PORTABLE (1 VIEW)   Final Result         1.  Pulmonary edema and/or infiltrates are identified, which appear somewhat increased since the prior exam.   2.  Cardiomegaly   3.  Atherosclerosis      CT-HEAD W/O   Final Result         1.  No acute intracranial abnormality is identified, there are changes compatible with small vessel ischemic disease and moderate atrophy.   2.  Chronic sinusitis changes of the right frontal, right maxillary, and left sphenoid sinus.      Echocardiogram Comp W/O Cont    (Results Pending)          Assessment/Plan:  I anticipate this patient will require at least two midnights for appropriate medical management, necessitating inpatient admission.    * Cardiac arrest (HCC)   Assessment & Plan    Out of hospital, but with quick bystander CPR as well as placement of shocks. Currently with [facility] chill protocol. Continue as per protocol.        Hypotension   Assessment & Plan    Due to cardiac arrrest.  Continue pressor therapy         Anemia- (present on admission)   Assessment & Plan    Normocytic anemia likely of chronic disease.  Monitor.  Transfuse if needed for hemoglobin less than 7 gm/dL        CKD (chronic kidney disease) stage 3, GFR 30-59 ml/min- (present on admission)   Assessment & Plan    Monitor.  Avoid nephrotoxins.  Likely a component of acute injury from cardiac arrest as well.         Type 2 diabetes mellitus (HCC)- (present on admission)   Assessment & Plan    With hyperglycemia.  Monitor.  Insulin as needed         Hyperlipidemia- (present on admission)   Assessment & Plan    Continue current management             VTE prophylaxis: SCD, heparin

## 2018-06-30 NOTE — PROGRESS NOTES
Pulmonary Critical Care Progress Note      Date of service:  6/30/2018    Chief Complaint:  Respiratory failure    Interval Events:  24 hour interval history reviewed  Reason for visit:  Respiratory failure, cardiac arrest, shock  Unable to provide ROS due to medical condition       - SR   - NE 30   - Maximo 110    - Vaso 0.03   - ARABELLA on CKD   - SB   - prop 20   - fent 25   - amio 0.5   - hypothermia protocol   - goal temp at 0400 today      Review of Systems   Unable to perform ROS: Medical condition       Physical Exam   Constitutional:   Sedated on ventilator   HENT:   Head: Normocephalic and atraumatic.   Right Ear: External ear normal.   Left Ear: External ear normal.   Nose: Nose normal.   Mouth/Throat: Oropharynx is clear and moist.   Eyes: Conjunctivae are normal. Pupils are equal, round, and reactive to light. Right eye exhibits no discharge. Left eye exhibits no discharge.   Neck: Normal range of motion. Neck supple. No JVD present. No tracheal deviation present.   Cardiovascular: Intact distal pulses.  Exam reveals no gallop and no friction rub.    Sinus bradycardia   Pulmonary/Chest: No stridor. He has no wheezes. He has rales (Few scattered crackles bilaterally).   Abdominal: Soft. Bowel sounds are normal. He exhibits no distension. There is no tenderness. There is no rebound.   Musculoskeletal: He exhibits no tenderness or deformity.   No clubbing or cyanosis   Neurological:   Sedated on propofol   Skin: Skin is warm and dry. No rash noted. He is not diaphoretic. No erythema. No pallor.       PFSH:  No change.    Respiratory:  Gorman Vent Mode: APVCMV, Rate (breaths/min): 24, Vt Target (mL): 440, PEEP/CPAP: 10, FiO2: 100, Static Compliance (ml / cm H2O): 26.1  Pulse Oximetry: 98 %  CXR images personally reviewed and compared to prior images  CXR with pulmonary edema    Recent Labs      06/29/18   2251  06/30/18   0409   ISTATAPH  7.240*  7.345*   ISTATAPCO2  53.9*  44.4*   ISTATAPO2  69  91*    ISTATATCO2  25  26   [id number]  90*  97   ISTATARTHCO3  23.1  24.2   ISTATARTBE  -5*  -2   ISTATTEMP  see below  35.8 C   ISTATFIO2  100  100   ISTATSPEC  Arterial  Arterial   ISTATAPHTC   --   7.362*   [id number]   --   85       HemoDynamics:  Pulse: (!) 51, Heart Rate (Monitored): (!) 51  Blood Pressure : 118/67, NIBP: (!) 86/49     Norepinephrine titrating  Phenylephrine titrating  Vasopressin 0.03    Recent Labs      06/29/18 2127 06/30/18   0230   TROPONINI  0.24*  3.29*   BNPBTYPENAT  338*   --        Neuro:    Fluids:  Intake/Output       06/28/18 0700 - 06/29/18 0659 (Not Admitted) 06/29/18 0700 - 06/30/18 0659 06/30/18 0700 - 07/01/18 0659      0700-1859 1537-8788 Total 0700-1859 7798-1394 Total 8831-7282 6334-0102 Total       Intake    I.V.  --  -- --  --  583 583  --  -- --    Magnesium Sulfate Volume -- -- -- -- 25.5 25.5 -- -- --    Amiodarone Volume -- -- -- -- 232 232 -- -- --    Propofol Volume -- -- -- -- 14.1 14.1 -- -- --    Norepinephrine Volume -- -- -- -- 255.2 255.2 -- -- --    Epinephrine Volume -- -- -- -- 56.3 56.3 -- -- --    Total Intake -- -- -- -- 583 583 -- -- --       Output    Urine  --  -- --  --  5 5  --  -- --    Output (mL) (Urinary Catheter Indwelling Catheter 16) -- -- -- -- 5 5 -- -- --    Total Output -- -- -- -- 5 5 -- -- --       Net I/O     -- -- -- -- 578 578 -- -- --        Weight: 108.9 kg (240 lb)  Recent Labs      06/29/18 2127 06/30/18   0230   SODIUM  141  142   POTASSIUM  4.0  3.8   CHLORIDE  107  107   CO2  22  27   BUN  40*  44*   CREATININE  2.65*  2.86*   MAGNESIUM   --   1.8   PHOSPHORUS   --   3.9   CALCIUM  7.5*  7.2*       GI/Nutrition:    Liver Function  Recent Labs      06/29/18 2127 06/30/18   0230   GLUCOSE  218*  298*       Heme:  Recent Labs      06/29/18 2127 06/30/18   0230   RBC  4.42*  4.48*   HEMOGLOBIN  11.3*  11.1*   HEMATOCRIT  38.3*  38.0*   PLATELETCT  205  189   PROTHROMBTM  23.7*   --    APTT  35.0   --    INR  2.15*   --         Infectious Disease:  Temp  Av.6 °C (97.9 °F)  Min: 35.9 °C (96.6 °F)  Max: 37 °C (98.6 °F)    Recent Labs      18   WBC  16.8*  16.9*   NEUTSPOLYS  83.50*   --    LYMPHOCYTES  7.00*   --    MONOCYTES  4.30   --    EOSINOPHILS  2.60   --    BASOPHILS  0.00   --      Current Facility-Administered Medications   Medication Dose Frequency Provider Last Rate Last Dose   • propofol (DIPRIVAN) injection  0-80 mcg/kg/min Continuous Yury Tovar M.D. 3.3 mL/hr at 18 0416 5 mcg/kg/min at 18 0416   • Cold NS infusion 2,178 mL  20 mL/kg Once Dwight Ann D.O.       • Cold NS infusion 1,089 mL  10 mL/kg Once PRN ARISTIDES Manzano.O.       • NS infusion   Continuous ARISTIDES Manzano.O. 100 mL/hr at 18 0110     • magnesium sulfate 20 g/500mL infusion  0.5-2 g/hr Continuous ARISTIDES Manzano.O. 25 mL/hr at 18 0330 1 g/hr at 18 0330   • meperidine (DEMEROL) injection 25 mg  25 mg Q HOUR PRN ARISTIDES Manzano.O.       • fentaNYL (SUBLIMAZE) injection 100 mcg  100 mcg Q HOUR PRN Dwight Ann D.O.       • midazolam (VERSED) 2 MG/2ML injection 2 mg  2 mg Once Dwight Ann D.O.        Followed by   • midazolam (VERSED) premix 125 mg/125 mL NS  0-10 mg/hr Continuous Dwight Ann D.O.       • vecuronium (NORCURON) injection 10.89 mg  0.1 mg/kg Once ARISTIDES Manzano.O.        Followed by   • vecuronium (NORCURON) 60 mg in D5W 500 mL Infusion  0-2 mcg/kg/min Continuous ARISTIDES Manzano.O.       • norepinephrine (LEVOPHED) 8 mg in  mL Infusion  0-30 mcg/min Continuous Dwight Ann D.O. 56.3 mL/hr at 18 0230 30 mcg/min at 18 0230   • K+ Scale: Goal of 4.5  1 Each Q6HRS Dwight Ann D.O.   1 Each at 18 0345   • sodium phosphate 15 mmol in 1/2  mL ivpb  15 mmol Once PRN Dwight Ann D.O.        Or   • sodium phosphate 30 mmol in 1/2  mL ivpb  30 mmol Once PRN Dwight Ann D.O.       • Respiratory  Care per Protocol   Continuous RT Dwight Ann D.O.       • senna-docusate (PERICOLACE or SENOKOT S) 8.6-50 MG per tablet 2 Tab  2 Tab BID Dwight Ann D.O.        And   • polyethylene glycol/lytes (MIRALAX) PACKET 1 Packet  1 Packet QDAY PRN AKHIL ManzanoO.        And   • magnesium hydroxide (MILK OF MAGNESIA) suspension 30 mL  30 mL QDAY PRN AKHIL ManzanoO.        And   • bisacodyl (DULCOLAX) suppository 10 mg  10 mg QDAY PRN ARISTIDES Manzano.O.       • fentaNYL (SUBLIMAZE) injection 25 mcg  25 mcg Q HOUR PRN AKHIL ManzanoO.        Or   • fentaNYL (SUBLIMAZE) injection 50 mcg  50 mcg Q HOUR PRN ARISTIDES Manzano.O.        Or   • fentaNYL (SUBLIMAZE) injection 100 mcg  100 mcg Q HOUR PRN ARISTIDES Manzano.O.       • fentaNYL (SUBLIMAZE) 50 mcg/mL in 50mL   Continuous ARISTIDES Manzano.O.   Stopped at 06/30/18 0416   • ipratropium-albuterol (DUONEB) nebulizer solution  3 mL Q2HRS PRN (RT) Dwight Ann D.O.       • ipratropium-albuterol (DUONEB) nebulizer solution  3 mL Q4HRS (RT) ARISTIDES Manzano.O.   3 mL at 06/30/18 0237   • famotidine (PEPCID) tablet 20 mg  20 mg DAILY Dwight Ann D.O.        Or   • famotidine (PEPCID) injection 20 mg  20 mg DAILY ARISTIDES Manzano.O.   20 mg at 06/30/18 0430   • insulin regular (HUMULIN R) injection 1-6 Units  1-6 Units Q6HRS ARISTIDES Manzano.O.   2 Units at 06/30/18 0305    And   • dextrose 50% (D50W) injection 25 mL  25 mL Q15 MIN PRN ARISTIDES Manzano.O.       • ampicillin/sulbactam (UNASYN) 3 g in  mL IVPB  3 g Q6HRS Dwight Ann D.O. 200 mL/hr at 06/30/18 0600 3 g at 06/30/18 0600   • aspirin (ASA) tablet 325 mg  325 mg DAILY Dwight Ann D.O.       • atorvastatin (LIPITOR) tablet 80 mg  80 mg DAILY Dwight Ann D.O.       • GENTEAL TEARS NIGHT-TIME OINT 1 Application  1 Application Q8HRS Dwight Ann D.O.   1 Application at 06/30/18 0600   • vasopressin (VASOSTRICT) 20 Units in  mL Infusion  0.03  Units/min Continuous Dwight Ann D.O.       • amiodarone (CORDARONE) 450 mg in D5W 250 mL Infusion  0.5-1 mg/min Continuous Yury Tovar M.D. 33 mL/hr at 06/30/18 0015 1 mg/min at 06/30/18 0015     Last reviewed on 6/30/2018 12:11 AM by Antoinette Isabel T    Quality  Measures:  Labs reviewed, Medications reviewed and Radiology images reviewed  Pedraza catheter: Critically Ill - Requiring Accurate Measurement of Urinary Output  Central line in place: Need for access and Vasopressors    DVT Prophylaxis: Heparin  DVT prophylaxis - mechanical: SCDs  Ulcer prophylaxis: Yes  Antibiotics: Treating active infection/contamination beyond 24 hours perioperative coverage        Assessment and Plan:    Acute hypoxemic respiratory failure   -Intubated 6/29   -Continue full vent support    S/P out of hospital ventricular fibrillation cardiac arrest   -ROSC after CPR, epinephrine and AED defibrillation   -Targeted temperature management protocol    Coronary artery disease with prior CABG   -Continue aspirin and statin    Acute on chronic systolic heart failure   -EF 20%   -S/P prior ICD placement    Cardiogenic shock   -Titrating vasopressor support with norepinephrine, phenylephrine and vasopressin    Query anoxic encephalopathy   -CT scan of the head negative for acute pathology   -Serial neurologic exams    Acute on chronic kidney disease   -Renal dose medications   -Avoid nephrotoxins   -Follow renal function    Possible aspiration pneumonia   -Continue Unasyn    Diabetes mellitus type 2   -Insulin drip for glycemic control    History of hypertension   -Now in cardiogenic shock with high-dose vasopressor support    Dyslipidemia   -Continue statin    COPD   -No acute exacerbation    History of atrial fibrillation   -Chronically anticoagulant with warfarin   -Bronchodilators      I have assessed and reassessed his respiratory status with ventilator adjustments, airway mechanics, ventilator waveforms, blood pressure,  hemodynamics, cardiovascular status with titration of vasopressors and neurologic status.  He is at increased risk for worsening respiratory, cardiovascular and CNS system dysfunction.    High risk of deterioration and worsening vital organ dysfunction and death without the above critical care interventions.    Critical Care Time:  90 minutes in addition to Dr. Ann earlier today  [id number]  No time overlap  Time excludes procedures  Discussed with RN, RT, Team, Clinical pharmacist, Hospitalist

## 2018-06-30 NOTE — PROGRESS NOTES
Dr. Hamilton on unit.  Arterial line and new Triple lumen catheter inserted.  Code Chill target temp 35.0

## 2018-06-30 NOTE — CONSULTS
Cardiology Initial Consult Note    Date of note:    6/29/2018      Consulting Physician: Art Mcdonnell M.D.    Patient ID:    Name:   Rell Pérez     YOB: 1950  Age:   67 y.o.  male   MRN:   [id number]      Reason for Consultation: cardiac arrest     HPI:  Rell Pérez is a 67 y.o.-year-old male with a history of diabetes, TIA, hypertension, COPD, dyslipidemia, CAD s/p 3V CABG (LIMA to LAD, SVGs to obtuse marginal and PDA; 04/2001), atrial fibrillation, CKD, and ischemic cardiomyopathy (EF20%), s/p ICD placement who presents with ventricular fibrillation cardiac arrest.     This history is obtained from the family and Dr. Mcdonnell as the patient is intubated and has known no neurologic function at this time.    The patient was in his usual state of health earlier this morning and in fact traveled to [place] from the [facility] to visit his son and debbie.  He arrived around 3 PM and had an uneventful dinner.  He did express some increased dyspnea on exertion after dinner.  While he was at the [facility] after dinner he had acute syncope which was witnessed.  When EMS arrived he had been down for 2-3 minutes is unclear if he had bystander CPR.  Initial presenting rhythm was ventricular fibrillation however I do not have the strips to review.  Per notes he was shocked once in the field given 2 shots of epinephrine and 2 shots of Narcan in route.  He had no neurologic activity on arrival to the emergency room and continues to have no neurologic activity at the time of my exam.  In the emergency room he was intubated, a femoral line was placed, and he required increasing pressor requirements secondary to cardiogenic shock.    On further history obtained from the family it turns out the patient previously have decided to be DNR/DNI.  He also expressed to his son at length that he wished in an emergency state for his family from the [place] area to be able to say goodbye to him before withdrawal of care if he were  unable to speak for himself.  His wife is the ARISTIDES JACKSON, however was in such grief today that she is unable to participate in decision-making.        Review of Systems   Unable to perform ROS: intubated     PMHx: diabetes, CAD, CHF, COPD, dyslipidemia, IA.     PSHx: CABG as per above. ICD      Current Outpatient Prescriptions   Medication Sig Dispense Refill   • ferrous gluconate (FERGON) 324 (38 Fe) MG Tab Take 1 Tab by mouth 3 times a day, with meals. 30 Tab    • furosemide (LASIX) 20 MG Tab Take 2 Tabs by mouth 2 times a day. 60 Tab 0   • potassium chloride SA (KDUR) 20 MEQ Tab CR Take 1 Tab by mouth every day. 30 Tab 0   • isosorbide mononitrate SR (IMDUR) 30 MG TABLET SR 24 HR Take 30 mg by mouth every morning.     • atorvastatin (LIPITOR) 40 MG Tab Take 40 mg by mouth every evening.     • carvedilol (COREG) 25 MG Tab Take 25 mg by mouth 2 times a day, with meals.     • hydrALAZINE (APRESOLINE) 25 MG Tab Take 12.5 mg by mouth 2 Times a Day.     • warfarin (COUMADIN) 2 MG Tab Take 2-3 mg by mouth every evening. 2mg on Wednesday/Friday/Saturday  3mg on Sunday/Monday/Tuesday/Thursday     • aspirin 81 MG tablet Take 81 mg by mouth every morning.     • acetaminophen (TYLENOL) 500 MG Tab Take 1,000 mg by mouth every 6 hours as needed for Moderate Pain.     • insulin NPH (HUMULIN,NOVOLIN) 100 UNIT/ML Suspension Inject 34-40 Units as instructed 2 times a day, with meals. Pt mixes with Humulin R BID with meals  40 units in the morning, 34 units in the evening     • insulin regular (HUMULIN R) 100 Unit/mL Solution Inject 8-20 Units as instructed 3 times a day before meals. Pt mixes with Humulin N for breakfast and dinner, then uses alone at lunchtime  16-22 units at breakfast  8-16 units at lunch  16-20 units at dinner  Depending on blood sugar/what pt eats     • sodium chloride 7% Solution 3 mL by Nebulization route as needed (shortness of breath).           Allergies   Allergen Reactions   • Cephalexin    • Fluconazole   "      Unable to obtain FH, patient intubated.     Social History     Social History   • Marital status:      Spouse name: N/A   • Number of children: N/A   • Years of education: N/A     Occupational History   • Not on file.     Social History Main Topics   • Smoking status: Former Smoker     Types: Cigarettes     Quit date: 10/28/1997   • Smokeless tobacco: Never Used   • Alcohol use No   • Drug use: Unknown   • Sexual activity: Not on file     Other Topics Concern   • Not on file     Social History Narrative   • No narrative on file         Physical Exam  Body mass index is 35.44 kg/m².  Blood pressure 140/67, pulse 74, temperature 36.6 °C (97.9 °F), resp. rate 18, height 1.753 m (5' 9\"), weight 108.9 kg (240 lb), SpO2 98 %.  Vitals:    06/29/18 2107 06/29/18 2133 06/29/18 2157 06/29/18 2205   BP: 106/57  (!) 165/74 140/67   Pulse: 75  84 74   Resp: 18  18 18   Temp: 36.6 °C (97.9 °F)      SpO2: 98% 96% 98% 98%   Weight:       Height:         Oxygen Therapy:  Pulse Oximetry: 98 %, O2 Delivery: Ventilator    General: comatose, intubated without sedation  Eyes: pale, injected conjunctiva  ENT: blood in ET tube  Neck: JVP indeterminate  Lungs: bronchial breath sounds bilaterally  Heart: RRR, 2/6 systolic murmur, +S3,  1+ edema bilateral lower extremities. + LV heave 1+ bilateral radial pulses.  1+ bilateral dp pulses.   Abdomen: soft, mild distension,  no palpable masses.  Extremities/MSK: no clubbing, no cyanosis  Neurological: no response to pain, pinpoint pupils bilaterally  Psychiatric: unresponsive  Skin: cool extremities        Labs (personally reviewed and notable for):   Creatinine 2.7, baseline 2.3.  Lactate 4.3      Cardiac Imaging and Procedures Review:    EKG dated 6/29/2018: My personal interpretation is NSR, PVC, 1st degree AV block, previous anterio rinfarct, previous lateral infarct.     Echo dated 10/29/2017:   CONCLUSIONS  No prior study is available for comparison.   Left ventricle is mildly " dilated.  Left ventricular ejection fraction is visually estimated to be 20%.  Mild mitral regurgitation.  Moderate tricuspid regurgitation.  Estimated right ventricular systolic pressure  is 50 mmHg.        Radiology test Review:  CXR: pending     Head CT:  1.  No acute intracranial abnormality is identified, there are changes compatible with small vessel ischemic disease and moderate atrophy.  2.  Chronic sinusitis changes of the right frontal, right maxillary, and left sphenoid sinus.      Impression and Medical Decision Making:  # Ventricular fibrillation cardiac arrest, minimal down time per report, however no meaningful neurologic activity since arrest.  # Ischemic cardiomyopathy, prior EF 20% in October  # ICD, unclear why this did not function, [facility]  # Cardiogenic shock  # lactic acidosis  # Acute on chronic kidney disease, severe  # CAD s/p CABG (LIMA to LAD, SVGs to obtuse marginal and PDA; 04/2001),  # history of hypertension  # h/o tia  # atrial fibrillation on coumadin  # Diabetes  # Goals of care - wife is DPOA, patient was supposedly DNR/DNI per advanced directive, however they do not have this with them.  Per family he wanted his entire family to be able to say bye to him if he were in a situation like this.  He had recent conversations about exactly this sort of situation with his son.     Recommendations:  # Presumed VT/VF arrest, possibly due to ischemia, will treat medically for now without acute ischemia on EKG and given poor neurologic prognosis - heparin gtt (if INR <2), aspirin 81mg PO Daily  # Will check echo, and interrogate defibrillator in the morning.  # bicarb for acidosis  # pressors to keep MAP >60, would place arterial line  # amiodarone bolus and gtt to prevent recurrent VT/VF  # will try to obtain advanced directive from [place]  # advised family to have the rest of his family travel to town.   # cool to 36 degrees to try and salvage neurologic function if possible.      Cardiology Critical Care Documentation    This patient is critically ill with cardiogenic shock s/p cardiac arrest.  I have spent a total of 39 minutes examining this patient, all lab data, x-ray, and discussion with nursing, other physicians, the family and respiratory therapy as well as performing procedures.  There has been no overlap with other physicians or billable procedures.  More than 50% of the time was spent Face-To-Face with the patient and their family.      Thank you for allowing me to participate in the care of this patient, Cardiology will continue to follow.  Please contact me with any questions.      Yury Tovar MD  Cardiologist, [facility] [place]

## 2018-06-30 NOTE — PROGRESS NOTES
Jeromy from Lab called with critical result of troponin at 0330. Critical lab result read back to Jeromy.   Dr. Tovar notified of critical lab result at 0350.  Critical lab result read back by Dr. Tovar.

## 2018-06-30 NOTE — PROGRESS NOTES
Dr. Ann phoned back and was updated on hypotension.  New orders for Vasopressin received and implemented

## 2018-06-30 NOTE — DISCHARGE PLANNING
Medical Social Work     SW received report via phone from the house SW that he was leaving and would like he ER SW to check on the family for red 11. SW met with the pt family in the family room and introduced herself. SW provided emotional support and answered all questions asked. MELCHOR met the the pt RN to get an update for the family. The RN advised SW that family can be brought back to the pt room. SW met with family and escorted them back to the pt room.     The family requested a perry for the pt. The was provided an update on the pt medical status by the MDs.     SW called the perry on call and he advised SW that he would be to [facility] within the hour. SW updated the family and the RN of the perry on his way.     MELCHOR sat with the pt wife Peyton at bedside and provided her with emotional support.     Pt is being transferred to the CIC room 633. MELCHOR provided the pt wife and family with the ER SW contact information.     Peyton Pérez (wife) [phone]    Plan: SW will remain available for pt and family support.

## 2018-06-30 NOTE — DISCHARGE PLANNING
SW was in ER Lobby when pt's son and DIL arrived. They stated their mother rode with pt in [employer] vehicle. MELCHOR called bedside RN who stated that family is not okay to come back at this time. SW attempted to locate pt's wife. Pt's wife was outside of pt's ER Room. SW escorted pt's wife and then pt's son and DIL to Family Waiting Room. MELCHOR updated bedside RN of this information.     Add 2200 SW updated Family in Family Waiting Room. They thanked this SW.

## 2018-06-30 NOTE — PROGRESS NOTES
Dr. Ann at bedside.  He wishes to hold heparin gtt until INR is less than 2.0.  He also requested target temperature be 36.0.  [place] temp adjusted to 36.0 degrees.

## 2018-06-30 NOTE — PROGRESS NOTES
Received bedside report from ER nurse.  Transferred from Red 11 to T 633 via monitored stretcher.  Max doses of Levophed and Epi running.  Received call from pharmacy -- pt is probably on Coumadin.  Code Chill cannot be started until INR is resulted and less than 2.0.

## 2018-06-30 NOTE — CARE PLAN
Problem: Mechanical Ventilation  Goal: Safe management of artificial airway and ventilation  VAP protocol in place, suctioned prn    Problem: Hemodynamic Status  Goal: Vital Signs and Fluid Balance Management  Vasopressors in use to keep SBP greater than 90

## 2018-06-30 NOTE — ED PROVIDER NOTES
"ER Provider Note     Scribed for Art Mcdonnell M.D. by Alma Solorzano. 6/29/2018, 9:02 PM.    Primary Care Provider: Pcp Pt States None  Means of Arrival: ambulance   History obtained from: EMS  History limited by: patient's altered mental status     CHIEF COMPLAINT  Chief Complaint   Patient presents with   • Cardiac Arrest       HPI  Rell Pérez is a 67 y.o. male with a medical history of Diabetes, TIA, hypertension, hyperlipidemia, and COPD who presents to the Emergency Department as he is status post cardiac arrest prior to arrival. [place] this [place] when he suddenly experienced a witness cardiac arrest. Per EMS the patient was down for 2-3 minutes. He went into ventricular fibrillation and was shocked once in the field. He was given 2 shots of epinephrine and 2 shots of Narcan en route. Patient does have a vern airway in place. He does have a cardiac history and has a surgical history of a CABG and a triple bypass. Per wife, the patient did not have any other medical complaints leading up to this event.     Further HPI is limited secondary to the patient's altered mental status.     REVIEW OF SYSTEMS  See HPI for further details. C    Further ROS is limited secondary to the patient's altered mental status.      PAST MEDICAL HISTORY   Diabetes, TIA, hypertension, hyperlipidemia, and COPD    SURGICAL HISTORY  Triple bypass, CABG    SOCIAL HISTORY  Social History   Substance Use Topics   • Smoking status: Former Smoker     Types: Cigarettes     Quit date: 10/28/1997   • Smokeless tobacco: Never Used   • Alcohol use No      History   Drug use: Unknown       FAMILY HISTORY  No family history noted    CURRENT MEDICATIONS  Current medications can be reviewed in the nurse's note.     ALLERGIES  Allergies   Allergen Reactions   • Cephalexin    • Fluconazole        PHYSICAL EXAM  VITAL SIGNS: /57   Pulse 75   Temp 36.6 °C (97.9 °F)   Resp 18   Ht 1.753 m (5' 9\")   Wt 108.9 kg (240 lb)   " SpO2 98%   BMI 35.44 kg/m²      Constitutional: Patient has a vern airway in place on arrival.   HENT: No signs of trauma, Bilateral external ears normal, Nose normal.   Eyes: Conjunctiva normal, Non-icteric.   Neck: Normal range of motion, Supple, No stridor.   Lymphatic: No lymphadenopathy noted.   Cardiovascular: Regular rate and rhythm, no murmurs.   Thorax & Lungs: Normal breath sounds, No respiratory distress, No wheezing.   Abdomen: Bowel sounds normal, Soft, No masses, No pulsatile masses. No peritoneal signs.  Skin: Warm, Dry, No erythema, No rash. Slightly modeled skin.   Extremities: Intact distal pulses, No edema, No cyanosis.  Musculoskeletal: Patient occasionally biting at the vern airway, no other purposeful movement.   Neurologic: Patient occasionally biting at the vern airway, no other purposeful movement.   Psychiatric: Unable to assess secondary to the patient's altered mental status     DIAGNOSTIC STUDIES / PROCEDURES    EKG Interpretation:  Interpreted by me    12 Lead EKG interpreted by me to show:  Normal sinus rhythm  Rate 74  Axis: Normal  Intervals: Normal  Normal T waves  No significant ST elevation or depression  Intermittent ectopic beats     My impression of this EKG: Does not indicate ischemia or arrhythmia at this time.     LABS  Labs Reviewed   CBC WITH DIFFERENTIAL - Abnormal; Notable for the following:        Result Value    WBC 16.8 (*)     RBC 4.42 (*)     Hemoglobin 11.3 (*)     Hematocrit 38.3 (*)     MCH 25.6 (*)     MCHC 29.5 (*)     RDW 53.0 (*)     Neutrophils-Polys 83.50 (*)     Lymphocytes 7.00 (*)     Neutrophils (Absolute) 14.46 (*)     All other components within normal limits   BASIC METABOLIC PANEL - Abnormal; Notable for the following:     Glucose 218 (*)     Bun 40 (*)     Creatinine 2.65 (*)     Calcium 7.5 (*)     Anion Gap 12.0 (*)     All other components within normal limits   TROPONIN - Abnormal; Notable for the following:     Troponin I 0.24 (*)     All  other components within normal limits   BTYPE NATRIURETIC PEPTIDE - Abnormal; Notable for the following:     B Natriuretic Peptide 338 (*)     All other components within normal limits   LACTIC ACID - Abnormal; Notable for the following:     Lactic Acid 4.3 (*)     All other components within normal limits   ESTIMATED GFR - Abnormal; Notable for the following:     GFR If  29 (*)     GFR If Non  24 (*)     All other components within normal limits   PROTHROMBIN TIME - Abnormal; Notable for the following:     PT 23.7 (*)     INR 2.15 (*)     All other components within normal limits    Narrative:     Every 6 hours until rewamed - Patient unresponsive - Code  Chill Protocol  Indicate which anticoagulants the patient is on:->UNKNOWN  Every 6 hours until rewamed - Patient unresponsive - Code  Chill Protocol.  Indicate which anticoagulants the patient is on:->UNKNOWN  Every 6 hours until rewarmed   ACCU-CHEK GLUCOSE - Abnormal; Notable for the following:     Glucose - Accu-Ck 199 (*)     All other components within normal limits   ISTAT ARTERIAL BLOOD GAS - Abnormal; Notable for the following:     Ph 7.240 (*)     Pco2 53.9 (*)     S02 90 (*)     BE -5 (*)     All other components within normal limits   INFECTIOUS DISEASE TESTING (EXPOSURE)   HIV RAPID SCREEN (EXPOSURE)    Narrative:     Enter exposed person's MRN only (do not enter employee's  name):->[id number]  Exposed person's employer?->[employer]   PLATELET ESTIMATE   MORPHOLOGY   PERIPHERAL SMEAR REVIEW   DIFFERENTIAL MANUAL   APTT    Narrative:     Every 6 hours until rewamed - Patient unresponsive - Code  Chill Protocol  Indicate which anticoagulants the patient is on:->UNKNOWN  Every 6 hours until rewamed - Patient unresponsive - Code  Chill Protocol.  Indicate which anticoagulants the patient is on:->UNKNOWN  Every 6 hours until rewarmed   TROPONIN   BASIC METABOLIC PANEL   CBC WITHOUT DIFFERENTIAL   MAGNESIUM   PHOSPHORUS   PROTHROMBIN  TIME   TRIGLYCERIDE   TROPONIN   BASIC METABOLIC PANEL   CBC WITHOUT DIFFERENTIAL   APTT   MAGNESIUM   PHOSPHORUS     All labs reviewed by me.    RADIOLOGY  CT-HEAD W/O   Final Result         1.  No acute intracranial abnormality is identified, there are changes compatible with small vessel ischemic disease and moderate atrophy.   2.  Chronic sinusitis changes of the right frontal, right maxillary, and left sphenoid sinus.      DX-CHEST-PORTABLE (1 VIEW)    (Results Pending)   CT-HEAD W/O    (Results Pending)   CT-HEAD W/O    (Results Pending)     The radiologist's interpretation of all radiological studies have been reviewed by me.    Intubation Procedure Note    Indication: impending airway compromise    Consent: Unable to be obtained due to the emergent nature of this procedure.    Medications Used: 20 mg Etomidate intravenously, 100 mg Rocuronium     Procedure: The patient was placed in the appropriate position.  Cricoid pressure was not required.  Intubation was performed using a video laryngoscope and an 8.0 cuffed endotracheal tube.  The tube was then secured appropriately at a distance of 24 cm to the dental ridge.  Initial confirmation of placement included improved skin color.  A post procedure X-ray was ordered and is still pending at this time.    The patient tolerated the procedure well.     Complications: None    Central Line Placement Procedure Note  Indication: hypotension     Consent: Unable to be obtained due to the emergent nature of this procedure.    Procedure: The patient was positioned appropriately and the skin over the left femoral vein was prepped with betadine and draped in a sterile fashion. A large bore needle was used to identify the vein. Patient coded at that time and CPR began, so the procedure stopped. Patient's pulse was found and the right femoral vein was then prepped with betadine and draped in a sterile fashion. A guide wire was then inserted into the vein through the needle. A  triple lumen catheter was then inserted into the vessel over the guide wire using the Seldinger technique.  All ports showed good, free flowing blood return and were flushed with saline solution.  The catheter was then securely fastened to the skin with sutures and covered with a sterile dressing.  A post procedure X-ray was ordered and showed good line position.    The patient tolerated the procedure well.    Complications: Patient coded during the procedure     COURSE & MEDICAL DECISION MAKING  Pertinent Labs & Imaging studies reviewed. (See chart for details)    This is a 67 y.o. male that presents with acute respiratory failure and status post cardiac arrest in the field.  At this time the patient is being ventilated through a Pierce airway.  At this point we need to change out this airway.  In addition we will search for etiology of the patient's cardiac arrest which I believe is likely ischemic in nature.  This could represent electrolyte derangements.  I will send off the below labs and change at the patient's Pierce airway for an ET tube..     9:02 PM - Called acutely into the patient's room and patient seen and examined at bedside. Ordered DX chest, CT head, CBC, BMP, troponin, BNP, lactic acid, EKG.  Patient will be medicated with Amidate 20 mg, Zemuron 100 mg, Levophed 8 mg in  mL for his symptoms. The patient will be resuscitated with 1L NS IV for hypotension.    9:19 PM- Performed intubation procedure as noted above without complications.    9:26 PM- Updated the patient's blood pressure is 47/29. Will place a central line and start the patient on pressors.    9:30 PM- Performed central line procedure as shown above, however, patient coded and the procedure was stopped.    9:46 PM- CPR started.     9:48 PM- 1 mg of epinephrine given.     9:50 PM- 1 mg of bicarbonate given.     9:51 PM- 1 mg of epinephrine given.     9:52 PM- Pulse found. Began central line procedure in the right femoral vein.    9:59  PM- Central line procedure was performed without difficulty.     10:22 PM- Paged cardiology for a consult. Patient will be treated with a norepinephrine drip that was then maxed out and the patient was started on epinephrine as a second drip.    The patient was found to have an elevated lactic acid of 4.3.  In addition the patient did have an elevated white blood cell count of 16.8.  His creatinine was elevated at 2.6 forrest appears to be the patient's baseline.  In addition the patient has a troponin of 0.244.  His BNP was 338.      10:23 PM- Discussed case with Dr. Tovar (cardiology) who will follow the patient.  At this time the cardiologist will recommend the patient be on amiodarone.  In addition he recommends heparin as well as an amp of bicarb.    10:54 PM- Reviewed the patient's lab and imaging results.  The patient was found to have no bleeding on the CT scan of his head.  10:58 PM- Updated patient's family on the plan of care.     11:25 PM- Paged the hospitalist to admit the patient and pulmonary to consult.     11:35 PM- 11:35 PM Spoke with Dr. Ann, pulmonary, about the patient's condition. He kindly agrees to follow the patient. Patient will be treated with sodium bicarbonate 50 mEq.     11:37 PM- Discussed case with Dr. Jaimes (hospitalist) who accepts the patient for admission.     There was a positive response to IV fluids given the blood pressure improved.      DISPOSITION:  Patient will be admitted to Dr. Jaimes (hospitalist) in critical condition.    The total critical care time on this patient is 45 minutes, resuscitating patient, speaking with admitting physician, and deciphering test results. This 45 minutes is exclusive of separately billable procedures.    FINAL IMPRESSION  1. Cardiac arrest (HCC)    2. Acute respiratory failure, unspecified whether with hypoxia or hypercapnia (HCC)    3. Ventricular arrhythmia       Critical care time of 45 minutes.      Alma CRUMP (Scranna), am scribing  for, and in the presence of, Art Mcdonnell M.D..    Electronically signed by: Alma Solorzano (Scribe), 6/29/2018    I, Art Mcdonnell M.D. personally performed the services described in this documentation, as scribed by Alma Solorzano in my presence, and it is both accurate and complete.     The note accurately reflects work and decisions made by me.  Art Mcdonnell  6/30/2018  1:50 AM

## 2018-06-30 NOTE — PROGRESS NOTES
Hypotensive after initiation of fentanyl.  Weaned dose as low as possible with little effect.  Drip paused for hypotension.  CPOT pain scale = 0

## 2018-06-30 NOTE — ED NOTES
Javier from Lab called with critical result of Lactic Acid at 4.3. Critical lab result read back to Javier.   Dr. Mcdonnell notified of critical lab result at 2140.  Critical lab result read back by Dr. Mcdonnell.

## 2018-06-30 NOTE — PROGRESS NOTES
Noted rhythm changes and increasing ectopy. Amio drip increased. Triturating medications medications. Page out for updates to Dr. Reed.

## 2018-06-30 NOTE — PROGRESS NOTES
Spiritual Care Note           Patient's Name: Rell Pérez   MRN: [id number]    Age and Gender: 67 y.o. male   YOB: 1950   Place of Residence: [place], [place]   Family/Friends/Others Present: wifePeyton   Unit: Cardiac ICU   Room (and Bed): [place] [address]   Ethnicity or Nationality:    Primary Language: English   Medical Diagnosis(-es)/Procedure(s):   presents with:       ventricular fibrillation cardiac arrest    history of:       diabetes       TIA       hypertension,        COPD       dyslipidemia       CAD s/p CABG        atrial fibrillation       CKD       ischemic cardiomyopathy (EF20%)       s/p ICD placement     Judaism Affiliation: Jewish   Code Status: Prior    Date of Admission: 6/29/2018    Length of Stay: 0 days   Date of Visit: 6/29/2018       Situation/Reason for Visit:  Received call from  that patient's wife was requesting prayer.    Background:  See above diagnoses.    Observations:  Patient lying in bed, sedated, intubated.  Patient at bedside tearful    Summary of Interaction/Conversation with Patient and/or Family/Friends/Others:  Wife shared life history of patient and asked for prayer.    Assessment of Cultural/Social/Emotional/Spiritual Issues:  Seeking spiritual support through prayer.    Interventions:  Compassionate presence, emotional support, reflective listening, prayer.    Outcomes:  Spiritual comfort, emotional release, progress with trust.    Consultations/Referrals:  none    Requests/Recommendations:  none    Continuing Care:  Will continue to follow.      Contact Information:  Chaplain TABITHA Vences  [phone]   [email or web address]

## 2018-07-01 PROBLEM — J69.0 ASPIRATION PNEUMONIA (HCC): Status: ACTIVE | Noted: 2018-01-01

## 2018-07-01 NOTE — PROGRESS NOTES
Patient is normothermic, sedation and fentanyl stopped to assess neuro status. Patient has poor kidney function so any response is expected to take time

## 2018-07-01 NOTE — CONSULTS
DATE OF SERVICE:  07/01/2018    REQUESTING PHYSICIAN:  Quinn Lauren MD    REASON FOR CONSULTATION:  Acute kidney injury on chronic kidney disease.    Unfortunately, the patient is intubated and unresponsive, unable to provide   any history.  Most of the history was through reviewing the record and   discussing the case with Dr. Ethan Lauren.    HISTORY OF PRESENT ILLNESS:  Briefly, the patient is an unfortunate   67-year-old gentleman with a past medical history significant for longstanding   diabetes, history of chronic kidney disease who has been followed by   nephrologist in [place], he has been visiting [place], [place], and he had a   witnessed cardiac arrest yesterday.  Patient was resuscitated, brought to the   hospital.  He is now intubated on IV pressors.  He developed acute kidney   injury on chronic kidney disease with a creatinine up to 3.48.  We were called   to manage his kidney disease and assist in the need for urgent dialysis.    Again, the patient is intubated and unresponsive, and unable to provide   history.    Past medical history, social history, family history, medication, and review   of systems as well as allergies are unobtainable, please refer to the chart.    PHYSICAL EXAMINATION:  GENERAL:  Patient is intubated and unresponsive.  VITAL SIGNS:  Show blood pressure of 90/60.  He is on IV pressors.  HEENT:  Normocephalic, atraumatic.  Patient has endotracheal tube.  NECK:  No lymphadenopathy.  CHEST:  Normal.  LUNGS:  Coarse breath sounds.  HEART:  S1, S2.  ABDOMEN:  Soft.  EXTREMITIES:  There is no lower extremity edema.  SKIN:  No skin rashes.  NEUROLOGIC:  Patient is unresponsive.    LABORATORY DATA:  His recent labs were reviewed.  Also, patient had a chest   x-ray, which I reviewed the image myself, showed airspace disease and   cardiomegaly.    ASSESSMENT:  1.  Acute kidney injury on chronic kidney disease, most likely acute tubular   necrosis secondary to cardiac arrest.  2.   Respiratory failure.  3.  Cardiac arrest.  4.  Hypoalbuminemia.  5.  Anemia.    PLAN:  1.  There is no acute need for renal replacement therapy.  2.  Continue to optimize cardiac function.  3.  Renal dose all medications.  4.  Avoid nephrotoxins.  5.  Serial kidney function tests.  6.  If there is no improvement in the creatinine in the next 24-48 hours, we   will consider dialysis.  7.  Prognosis is guarded because of multisystem organ failure.    Plan discussed in detail with Dr. Ethan Lauren who I would like to thank for   consulting me in this very interesting case.       ____________________________________     FADI NAJJAR, MD FN / RITESH    DD:  07/01/2018 15:50:28  DT:  07/01/2018 16:05:09    D#:  [id number]  Job#:  [id number]

## 2018-07-01 NOTE — PROGRESS NOTES
[facility] Hospitalist Progress Note    Date of Service: 2018    Chief Complaint  67 y.o. male admitted 2018 with cardiac arrest     Interval Problem Update    Being rewarmed  Sedated  Paced at 60   Levo 15 briana 45 vaso 0.03  amio 1 prop 25 fentanyl 50   CVP 9  Tmax 35.1   +3L since admit  VD#2   Day 2 unasyn            Consultants/Specialty  Critical care  Cardiology  Palliative care    Disposition  To be determined        Review of Systems   Unable to perform ROS: Intubated      Physical Exam  Laboratory/Imaging   Hemodynamics  Temp (24hrs), Av.3 °C (93.8 °F), Min:33.2 °C (91.8 °F), Max:35.1 °C (95.2 °F)   Temperature: (!) 35.1 °C (95.2 °F), Monitored Temp: 35.1 °C (95.2 °F)  Pulse  Av.8  Min: 36  Max: 84 Heart Rate (Monitored): (!) 42  Blood Pressure : 112/48, Arterial BP: 125/63, NIBP: (!) 107/37 CVP (mm Hg): (!) 11 MM HG    Respiratory  Gorman Vent Mode: APVCMV, Rate (breaths/min): 24, PEEP/CPAP: 10, PEEP/CPAP: 10, FiO2: 50, P Peak (PIP): 30, P MEAN: 16   Respiration: (!) 24, Pulse Oximetry: 100 %     Work Of Breathing / Effort: Vented  RUL Breath Sounds: Crackles, RML Breath Sounds: Diminished, RLL Breath Sounds: Diminished, KE Breath Sounds: Crackles, LLL Breath Sounds: Diminished    Fluids    Intake/Output Summary (Last 24 hours) at 18 1021  Last data filed at 18 1000   Gross per 24 hour   Intake          3061.33 ml   Output              170 ml   Net          2891.33 ml       Nutrition  Orders Placed This Encounter   Procedures   • Diet NPO     Standing Status:   Standing     Number of Occurrences:   1     Order Specific Question:   Type:     Answer:   Now [1]     Order Specific Question:   Restrict to:     Answer:   Strict [1]     Physical Exam   Constitutional: He appears well-developed.   HENT:   Head: Normocephalic and atraumatic.   Nose: Nose normal.   Mouth/Throat: Oropharynx is clear and moist. No oropharyngeal exudate.   Eyes: Conjunctivae are normal. Pupils are  equal, round, and reactive to light. Right eye exhibits no discharge. Left eye exhibits no discharge.   Pupils equal and sluggish reaction to light   Neck: No JVD present. No tracheal deviation present.   Cardiovascular: Normal rate and regular rhythm.    No murmur heard.  Pulmonary/Chest: No stridor. He has decreased breath sounds. He has no wheezes. He has no rhonchi. He has rales. He exhibits no tenderness.   Intubated   Abdominal: Soft. Bowel sounds are normal. He exhibits no distension. There is no tenderness. There is no rebound.   Musculoskeletal: He exhibits edema.   Neurological:   Exam limited by sedation     Skin: Skin is warm and dry. He is not diaphoretic. No cyanosis.   Psychiatric:   Sedated       Recent Labs      06/30/18 1819 06/30/18 2327 07/01/18 0345   WBC  14.4*  13.1*  12.8*   RBC  4.38*  4.41*  4.63*   HEMOGLOBIN  11.1*  11.4*  11.7*   HEMATOCRIT  35.8*  34.9*  36.7*   MCV  81.7  79.1*  79.3*   MCH  25.3*  25.9*  25.3*   MCHC  31.0*  32.7*  31.9*   RDW  50.0  48.1  48.7   PLATELETCT  180  183  195   MPV  10.6  10.2  10.9     Recent Labs      06/30/18 1819 06/30/18 2327 07/01/18   0345   SODIUM  137  137  136   POTASSIUM  4.2  4.5  4.4   CHLORIDE  106  109  109   CO2  20  17*  16*   GLUCOSE  267*  274*  232*   BUN  46*  49*  48*   CREATININE  2.99*  3.11*  3.45*   CALCIUM  6.6*  7.1*  7.1*     Recent Labs      06/30/18 1819 06/30/18 2327 07/01/18   0345   APTT  47.1*  54.0*  60.9*   INR  3.08*  3.48*  3.72*     Recent Labs      06/29/18 2127   BNPBTYPENAT  338*     Recent Labs      06/30/18   0230   TRIGLYCERIDE  132          Assessment/Plan     * Cardiac arrest (HCC)   Assessment & Plan    Discussed with cardiology, no V. fib/V. tach on reevaluate neurologic exam after patient is rewarmed and sedation is held AICD interrogation  On amiodarone per cardiology  Concern for possible anoxic encephalopathy reevaluate neurologic exam after patient is rewarmed and sedation is  held        Acute hypoxemic respiratory failure (HCC)   Assessment & Plan    Vent management per critical care discussed with Dr. Reed        Hypotension   Assessment & Plan    Cardiogenic shock    Continue pressor support and wean off as tolerated        Anemia- (present on admission)   Assessment & Plan    Hemoglobin 11.4 no signs of active bleeding continue to monitor        CKD (chronic kidney disease) stage 3, GFR 30-59 ml/min- (present on admission)   Assessment & Plan    Now with acute kidney injury likely secondary to ATN in setting of cardiac arrest and shock  -Monitor electrolytes and avoid nephrotoxic agents  We will consult nephrology discussed with Dr. Najjar        Aspiration pneumonia ([facility])   Assessment & Plan    Continue Unasyn complete 5 day course        Ischemic cardiomyopathy   Assessment & Plan    EF 20%  Cardiology following discussed with Dr. lopez        Atrial fibrillation ([facility])   Assessment & Plan    Paroxysmal    Resume warfarin when INR less than 3  On amiodarone drip cardiology following        Type 2 diabetes mellitus ([facility])- (present on admission)   Assessment & Plan    Uncontrolled with hyperglycemia     Insulin sliding scale and monitor blood sugars        Hyperlipidemia- (present on admission)   Assessment & Plan    Continue atorvastatin        Circulating anticoagulants (HCC)- (present on admission)   Assessment & Plan    INR supratherapeutic continue to hold warfarin and monitor levels          Quality-Core Measures   Reviewed items::  Labs reviewed, Medications reviewed and Radiology images reviewed  Pedraza catheter::  Critically Ill - Requiring Accurate Measurement of Urinary Output and Unconscious / Sedated Patient on a Ventilator  Central line in place:  Shock  DVT prophylaxis pharmacological::  Warfarin (Coumadin)  Antibiotics:  Treating active infection/contamination beyond 24 hours perioperative coverage      Overall prognosis is guarded, discussed findings and  plan of care with patient's wife and son at the bedside, they will reevaluate goals of care after depending on neurologic recovery after patient is rewarmed

## 2018-07-01 NOTE — CARE PLAN
Problem: Ventilation Defect:  Goal: Ability to achieve and maintain unassisted ventilation or tolerate decreased levels of ventilator support  Outcome: PROGRESSING AS EXPECTED  Vent day 2    8.0 ETT @ 25    CMV 24          440          +10          60%    Sux small amounts of thin bloody secretions

## 2018-07-01 NOTE — CARE PLAN
Problem: Ventilation Defect:  Goal: Ability to achieve and maintain unassisted ventilation or tolerate decreased levels of ventilator support    Intervention: Support and monitor invasive and noninvasive mechanical ventilation  Adult Ventilation Update    Total Vent Days: 2    Patient Lines/Drains/Airways Status    Active Airway     Name: Placement date: Placement time: Site: Days:    Airway Group ET Tube Oral 8.0 @ 25 06/29/18   2131   Oral   2              18 440 +10 40%      Patient failed trials because of Barriers to Wean: FiO2 >60% or PEEP >10 CM H2O (07/01/18 0629)    Events/Summary/Plan: Pt remains stable on current settings, will continue to monitor.

## 2018-07-01 NOTE — CONSULTS
[facility]    Onsite Evalutation    Referral # [id number]    Date 6/30/18 Time: 12:15    Thank you for the referral of this patient. A chart review has been completed to determine suitability for organ donation.     Donation is an option.  - Upon meeting criteria for brain death, this patient will be a potential candidate for organ donation, pending further evaluation.        Please call us immediately with any problems, questions, or significant changes in the patient's status.     Please call us immediately with any plans for brain death evaluation, family meetings or plans to withdraw care.     Organ donation should not be mentioned without prior collaboration with [facility] so that the conversation can be a planned, one-time coordinated event with the health care team.     Call [phone] with any immediate needs.     Thank you for your continued support of organ and tissue donation.       GAYLE Llanos, [facility] Donation Coordinator

## 2018-07-01 NOTE — PROGRESS NOTES
12 hour chart check    MS: SB with 1st degree and BBB(45-50)  Then change to v paced 40 at 1115 frequent nonsustained PVC occ sinus beats

## 2018-07-01 NOTE — PROGRESS NOTES
[facility]'s pacer rep paged to return to interrogate the patient's pacer and make adjustments to the pacer per night shift RN plan of care. I spoke with Concha at [facility] [phone] and she asked what we wanted done, I explained I was not sure what the orders were as I just got onto shift but I know we wanted her to come out for adjustments. [facility] was paged to clarify orders, HANNAH Fajardo from [employer] returned the call asking what the orders would be, she said she would call Concha back herself

## 2018-07-01 NOTE — PROGRESS NOTES
Patient's [place] setting updated to Rewarming. Family present at bedside, education about rewarming process given. Family verbalized understanding. Will continue monitor and assess patient appropriately and evaluate effectiveness of therapy

## 2018-07-01 NOTE — PROGRESS NOTES
Pulmonary Critical Care Progress Note      Date of service:  7/1/2018    Chief Complaint:  Respiratory failure    Interval Events:  24 hour interval history reviewed  Reason for visit:  Respiratory failure, cardiac arrest, shock  Unable to provide ROS due to medical condition       - junctional rhythm - naresh - now paced at 60   - amio 1   - NE 15   - vaso 0.03   - Maximo 50   - fent 50   - prop 20   - rewarming today   - vent 5   - Unasyn day 2      Review of Systems   Unable to perform ROS: Medical condition       Physical Exam   Constitutional:   On ventilator, sedated   HENT:   Head: Normocephalic and atraumatic.   Right Ear: External ear normal.   Left Ear: External ear normal.   Mouth/Throat: No oropharyngeal exudate.   Eyes: Conjunctivae are normal. Pupils are equal, round, and reactive to light. Right eye exhibits no discharge. Left eye exhibits no discharge. No scleral icterus.   Neck: Normal range of motion. Neck supple. No JVD present. No tracheal deviation present.   Cardiovascular: Intact distal pulses.  Exam reveals no gallop and no friction rub.    Paced rhythm   Pulmonary/Chest: No stridor. He has no wheezes. He has rales (Scattered coarse crackles bilaterally).   Abdominal: Soft. Bowel sounds are normal. He exhibits no distension. There is no tenderness. There is no rebound.   Musculoskeletal: He exhibits no tenderness or deformity.   No clubbing or cyanosis   Neurological:   Sedated on propofol   Skin: Skin is warm and dry. No rash noted. He is not diaphoretic. No erythema. No pallor.       PFSH:  No change.    Respiratory:  Gorman Vent Mode: APVCMV, Rate (breaths/min): 24, Vt Target (mL): 440, PEEP/CPAP: 10, FiO2: 70, Static Compliance (ml / cm H2O): 30, Control VTE (exp VT): 413  Pulse Oximetry: 100 %  CXR images personally reviewed and compared to prior images  CXR with improved edema    Recent Labs      06/29/18   2251  06/30/18   0409  07/01/18   0332   ISTATAPH  7.240*  7.345*  7.476    ISTATAPCO2  53.9*  44.4*  22.9*   ISTATAPO2  69  91*  180*   ISTATATCO2  25  26  18*   CGVNKAZ8LLG  90*  97  100*   ISTATARTHCO3  23.1  24.2  16.9*   ISTATARTBE  -5*  -2  -5*   ISTATTEMP  see below  35.8 C  35.8 C   ISTATFIO2  100  100  70   ISTATSPEC  Arterial  Arterial  Arterial   ISTATAPHTC   --   7.362*  7.494   PPTAGJTM1AP   --   85  174*       HemoDynamics:  Pulse: (!) 40, Heart Rate (Monitored): (!) 40  Blood Pressure : 112/48, Arterial BP: 109/54, NIBP: 116/57  CVP (mm Hg): (!) 11 MM HG  Norepinephrine titrating  Phenylephrine titrating  Vasopressin 0.03    Recent Labs      06/29/18 2127 06/30/18   0230   TROPONINI  0.24*  3.29*   BNPBTYPENAT  338*   --        Neuro:    Fluids:  Intake/Output       06/29/18 0700 - 06/30/18 0659 06/30/18 0700 - 07/01/18 0659 07/01/18 0700 - 07/02/18 0659      5203-3038 7640-4013 Total 8257-0527 0384-1988 Total 7787-3323 5598-6703 Total       Intake    P.O.  --  -- --  --  0 0  --  -- --    P.O. -- -- -- -- 0 0 -- -- --    I.V.  --  786.1 786.1  1691.2  1744.6 3435.7  --  -- --    Magnesium Sulfate Volume -- 75.5 75.5 187.5 -- 187.5 -- -- --    PCA End of Shift Total Volume (ml) -- -- -- 9 -- 9 -- -- --    Amiodarone Volume -- 265 265 299.8 429 728.8 -- -- --    Vasopressin Volume -- -- -- 106.1 90 196.1 -- -- --    Phenylephrine Volume -- -- -- 327.7 194.2 521.9 -- -- --    Propofol Volume -- 21.5 21.5 178.8 154 332.8 -- -- --    Norepinephrine Volume -- 367.8 367.8 448.3 305.2 753.5 -- -- --    Insulin Volume -- -- -- 134.1 572.1 706.2 -- -- --    Epinephrine Volume -- 56.3 56.3 -- -- -- -- -- --    Other  --  -- --  --  0 0  --  -- --    Medications (P.O./ Enteral Liquids) -- -- -- -- 0 0 -- -- --    Enteral  --  -- --  --  0 0  --  -- --    Free Water / Tube Flush -- -- -- -- 0 0 -- -- --    Total Intake -- 786.1 786.1 1691.2 1744.6 3435.7 -- -- --       Output    Urine  --  5 5  115  50 165  --  -- --    Output (mL) (Urinary Catheter Indwelling Catheter 16) -- 5 5  115 50 165 -- -- --    Stool  --  -- --  --  -- --  --  -- --    Number of Times Stooled -- -- -- 2 x 0 x 2 x -- -- --    Emesis/NG output  --  50 50  100  15 115  --  -- --    Output (mL) (Enteral Tube Oral) -- 50 50 100 15 115 -- -- --    Total Output -- 55 55 215 65 280 -- -- --       Net I/O     -- 731.1 731.1 1476.2 1679.6 3155.7 -- -- --        Weight: 115.5 kg (254 lb 10.1 oz)  Recent Labs      18   0230   18   12318   SODIUM  142   < >  138  137  137  136   POTASSIUM  3.8   < >  4.1  4.2  4.5  4.4   CHLORIDE  107   < >  106  106  109  109   CO2  27   < >  22  20  17*  16*   BUN  44*   < >  46*  46*  49*  48*   CREATININE  2.86*   < >  2.81*  2.99*  3.11*  3.45*   MAGNESIUM  1.8   < >  4.8*  4.0*  3.9*  3.8*   PHOSPHORUS  3.9   --   3.3   --    --   4.7*   CALCIUM  7.2*   < >  6.9*  6.6*  7.1*  7.1*    < > = values in this interval not displayed.       GI/Nutrition:    Liver Function  Recent Labs      18   ALTSGPT  24   --    --    ASTSGOT  37   --    --    ALKPHOSPHAT  61   --    --    TBILIRUBIN  0.8   --    --    GLUCOSE  267*  274*  232*       Heme:  Recent Labs      18   RBC  4.38*  4.41*  4.63*   HEMOGLOBIN  11.1*  11.4*  11.7*   HEMATOCRIT  35.8*  34.9*  36.7*   PLATELETCT  180  183  195   PROTHROMBTM  31.5*  34.7*  36.6*   APTT  47.1*  54.0*  60.9*   INR  3.08*  3.48*  3.72*       Infectious Disease:  Monitored Temp 2  Av.8 °C (94.6 °F)  Min: 33.5 °C (92.3 °F)  Max: 35.6 °C (96.1 °F)  Temp  Av.5 °C (94.1 °F)  Min: 33.2 °C (91.8 °F)  Max: 35.6 °C (96.1 °F)    Recent Labs      18   0345   WBC  16.8*   < >  14.4*  13.1*  12.8*   NEUTSPOLYS  83.50*   --   83.40*   --   81.20*   LYMPHOCYTES  7.00*   --   5.60*   --   6.40*   MONOCYTES  4.30   --   9.10   --   8.20   EOSINOPHILS  2.60    --   0.80   --   3.00   BASOPHILS  0.00   --   0.30   --   0.50   ASTSGOT   --    --   37   --    --    ALTSGPT   --    --   24   --    --    ALKPHOSPHAT   --    --   61   --    --    TBILIRUBIN   --    --   0.8   --    --     < > = values in this interval not displayed.     Current Facility-Administered Medications   Medication Dose Frequency Provider Last Rate Last Dose   • insulin regular (HUMULIN R) injection 1-6 Units  1-6 Units Q6HRS ARISTIDES Manzano.O.   1 Units at 07/01/18 0510    And   • glucose 4 g chewable tablet 16 g  16 g Q15 MIN PRN AKHIL ManzanoO.        And   • dextrose 50% (D50W) injection 25 mL  25 mL Q15 MIN PRN AKHIL ManzanoO.       • propofol (DIPRIVAN) injection  0-80 mcg/kg/min Continuous Yury Tovar M.D. 13.1 mL/hr at 07/01/18 0544 20 mcg/kg/min at 07/01/18 0544   • Cold NS infusion 1,089 mL  10 mL/kg Once PRN AKHIL ManzanoO.       • vecuronium (NORCURON) 60 mg in D5W 500 mL Infusion  0-2 mcg/kg/min Continuous ARISTIDES Manzano.O.       • sodium phosphate 15 mmol in 1/2  mL ivpb  15 mmol Once PRN AKHIL ManzanoO.        Or   • sodium phosphate 30 mmol in 1/2  mL ivpb  30 mmol Once PRN ARISTIDES Manzano.O.       • Respiratory Care per Protocol   Continuous RT AKHIL ManzanoO.       • senna-docusate (PERICOLACE or SENOKOT S) 8.6-50 MG per tablet 2 Tab  2 Tab BID Dwight nAn D.O.   Stopped at 06/30/18 0900    And   • polyethylene glycol/lytes (MIRALAX) PACKET 1 Packet  1 Packet QDAY PRN AKHIL ManzanoO.        And   • magnesium hydroxide (MILK OF MAGNESIA) suspension 30 mL  30 mL QDAY PRN AKHIL ManzanoO.        And   • bisacodyl (DULCOLAX) suppository 10 mg  10 mg QDAY PRN AKHIL ManzanoO.       • fentaNYL (SUBLIMAZE) injection 25 mcg  25 mcg Q HOUR PRN Dwight Ann D.O.        Or   • fentaNYL (SUBLIMAZE) injection 50 mcg  50 mcg Q HOUR PRN AKHIL ManzanoO.        Or   • fentaNYL (SUBLIMAZE) injection 100 mcg  100 mcg  Q HOUR PRN Dwight Ann D.O.       • fentaNYL (SUBLIMAZE) 50 mcg/mL in 50mL   Continuous AKHIL ManzanoOMirza 1 mL/hr at 06/30/18 1230 50 mcg/hr at 06/30/18 1230   • ipratropium-albuterol (DUONEB) nebulizer solution  3 mL Q2HRS PRN (RT) Dwight Ann D.O.       • ipratropium-albuterol (DUONEB) nebulizer solution  3 mL Q4HRS (RT) AKHIL ManzanoO.   3 mL at 07/01/18 0241   • famotidine (PEPCID) tablet 20 mg  20 mg DAILY Dwight Ann D.O.        Or   • famotidine (PEPCID) injection 20 mg  20 mg DAILY AKHIL ManzanoOMirza   20 mg at 07/01/18 0510   • ampicillin/sulbactam (UNASYN) 3 g in  mL IVPB  3 g Q6HRS AKHIL ManzanoOMirza   Stopped at 07/01/18 0540   • aspirin (ASA) tablet 325 mg  325 mg DAILY AKHIL ManzanoOMirza   Stopped at 06/30/18 0900   • atorvastatin (LIPITOR) tablet 80 mg  80 mg DAILY AKHIL ManzanoOMirza   Stopped at 06/30/18 0900   • GENTEAL TEARS NIGHT-TIME OINT 1 Application  1 Application Q8HRS ARISTIDES Manzano.OMirza   1 Application at 07/01/18 0600   • vasopressin (VASOSTRICT) 20 Units in  mL Infusion  0.03 Units/min Continuous AKHIL ManzanoOMirza 9 mL/hr at 07/01/18 0148 0.03 Units/min at 07/01/18 0148   • phenylephrine (JAMSHID-SYNEPHRINE) 40,000 mcg in  mL Infusion  0-300 mcg/min Continuous Art Reed M.D. 18.8 mL/hr at 07/01/18 0147 50 mcg/min at 07/01/18 0147   • norepinephrine (LEVOPHED) 16 mg in  mL Infusion  0-30 mcg/min Continuous Art Reed M.D. 28.1 mL/hr at 07/01/18 0548 15 mcg/min at 07/01/18 0548   • amiodarone (CORDARONE) 450 mg in D5W 250 mL Infusion  0.5-1 mg/min Continuous Yury Tovar M.D. 33 mL/hr at 07/01/18 0144 1 mg/min at 07/01/18 0144     Last reviewed on 6/30/2018 10:29 AM by Oscar Broussard RCOLLETTE    Quality  Measures:  Labs reviewed, Medications reviewed and Radiology images reviewed  Pedraza catheter: Critically Ill - Requiring Accurate Measurement of Urinary Output  Central line in place: Need for access and  Vasopressors    DVT Prophylaxis: Heparin  DVT prophylaxis - mechanical: SCDs  Ulcer prophylaxis: Yes  Antibiotics: Treating active infection/contamination beyond 24 hours perioperative coverage        Assessment and Plan:    Acute hypoxemic respiratory failure   -Intubated 6/29   -Continue full vent support    S/P out of hospital cardiac arrest   -ROSC after CPR, epinephrine and AED defibrillation   -Targeted temperature management protocol - rewarming initiated    Coronary artery disease with prior CABG   -Continue aspirin and statin    Acute on chronic systolic heart failure   -EF 15-20%   -S/P prior ICD placement    Cardiogenic shock   -Titrating vasopressor support with norepinephrine, phenylephrine and vasopressin    Query anoxic encephalopathy   -CT scan of the head negative for acute pathology   -Serial neurologic exams    Acute on chronic kidney disease   -Worse   -Renal dose medications   -Avoid nephrotoxins   -Follow renal function    Possible aspiration pneumonia   -Continue Unasyn    Diabetes mellitus type 2   -Start sliding scale insulin    History of hypertension   -Now in cardiogenic shock with high-dose vasopressor support    Dyslipidemia   -Continue statin    COPD   -No acute exacerbation    History of atrial fibrillation   -Chronically anticoagulant with warfarin   -Bronchodilators      I have assessed and reassessed his respiratory status with ventilator adjustments, ventilator waveforms, airway mechanics, hemodynamics, blood pressure, cardiovascular status with titration of vasopressors and neurologic status.  He is at increased risk for worsening cardiovascular, respiratory and CNS system dysfunction.    High risk of deterioration and worsening vital organ dysfunction and death without the above critical care interventions.    Critical Care Time:  34 minutes  [id number]  No time overlap  Time excludes procedures  Discussed with RN, RT, Team, Clinical pharmacist, Hospitalist

## 2018-07-01 NOTE — PROGRESS NOTES
Cardiology Progress Note               Author: Memo De Los Santos Date & Time created: 2018  11:53 AM     Interval History:  : /62.  Pulse 43.  Sinus.  On IV Levophed, IV Maximo-Synephrine, IV vasopressin, IV amiodarone.  No cardiac events.  Hypothermic protocol now rewarming.    Chief Complaint:  Cardiac arrest.    Review of Systems   Unable to perform ROS: Intubated       Physical Exam   Constitutional: No distress.   Intubated.  Sedated.   HENT:   Head: Normocephalic and atraumatic.   Eyes: EOM are normal. No scleral icterus.   Pupils mid range equal.   Neck: No JVD present.       Cardiovascular: Normal rate, regular rhythm, S1 normal, S2 normal, normal heart sounds and intact distal pulses.  Exam reveals no gallop and no friction rub.    No murmur heard.  Pulmonary/Chest: Effort normal and breath sounds normal. He has no wheezes. He has no rhonchi. He has no rales.   Sternal scar.  Left subclavian AICD generator.   Musculoskeletal: He exhibits no edema.   Skin: Skin is warm and dry.       Hemodynamics:  Temp (24hrs), Av.3 °C (93.8 °F), Min:33.2 °C (91.8 °F), Max:35.1 °C (95.2 °F)  Temperature: (!) 35.1 °C (95.2 °F), Monitored Temp: 35.1 °C (95.2 °F)  Pulse  Av.8  Min: 36  Max: 84Heart Rate (Monitored): 60  Blood Pressure : 112/48, Arterial BP: 125/63, NIBP: (!) 107/37  CVP (mm Hg): (!) 11 MM HG  Respiratory:  Gorman Vent Mode: APVCMV, Rate (breaths/min): 18, PEEP/CPAP: 10, FiO2: 40, P Peak (PIP): 27, P MEAN: 14 Respiration: (!) 24, Pulse Oximetry: 100 %     Work Of Breathing / Effort: Vented  RUL Breath Sounds: Crackles, RML Breath Sounds: Diminished, RLL Breath Sounds: Diminished, KE Breath Sounds: Crackles, LLL Breath Sounds: Diminished  Fluids:  Date 18 0700 - 18 0659   Shift 1802-4359 3403-3771 8901-7382 24 Hour Total   I  N  T  A  K  E   Shift Total       O  U  T  P  U  T   Urine 30   30    Shift Total 30   30   Weight (kg) 115.5 115.5 115.5 115.5       Weight: 115.5 kg  (254 lb 10.1 oz)  GI/Nutrition:  Orders Placed This Encounter   Procedures   • Diet NPO     Standing Status:   Standing     Number of Occurrences:   1     Order Specific Question:   Type:     Answer:   Now [1]     Order Specific Question:   Restrict to:     Answer:   Strict [1]     Lab Results:  Recent Labs      06/30/18 2327 07/01/18 0345 07/01/18   1045   WBC  13.1*  12.8*  12.9*   RBC  4.41*  4.63*  4.56*   HEMOGLOBIN  11.4*  11.7*  11.4*   HEMATOCRIT  34.9*  36.7*  37.3*   MCV  79.1*  79.3*  81.8   MCH  25.9*  25.3*  25.0*   MCHC  32.7*  31.9*  30.6*   RDW  48.1  48.7  51.2*   PLATELETCT  183  195  177   MPV  10.2  10.9  10.7     Recent Labs      06/30/18 2327 07/01/18 0345 07/01/18   1045   SODIUM  137  136  138   POTASSIUM  4.5  4.4  4.4   CHLORIDE  109  109  110   CO2  17*  16*  18*   GLUCOSE  274*  232*  183*   BUN  49*  48*  51*   CREATININE  3.11*  3.45*  3.48*   CALCIUM  7.1*  7.1*  7.0*     Recent Labs      06/30/18 2327 07/01/18 0345 07/01/18   1045   APTT  54.0*  60.9*  59.4*   INR  3.48*  3.72*  4.68*     Recent Labs      06/29/18 2127   BNPBTYPENAT  338*     Recent Labs      06/29/18 2127 06/30/18   0230   TROPONINI  0.24*  3.29*   BNPBTYPENAT  338*   --      Recent Labs      06/30/18   0230   TRIGLYCERIDE  132         Medical Decision Making, by Problem:  Active Hospital Problems    Diagnosis   • *Cardiac arrest (HCC) [I46.9]   • Hypotension [I95.9]   • Acute hypoxemic respiratory failure (HCC) [J96.01]   • Cardiogenic shock (HCC) [R57.0]   • Anemia [D64.9]   • CKD (chronic kidney disease) stage 3, GFR 30-59 ml/min [N18.3]   • Atrial fibrillation (HCC) [I48.91]   • Ischemic cardiomyopathy [I25.5]   • Hyperlipidemia [E78.5]   • Type 2 diabetes mellitus (HCC) [E11.9]   • Circulating anticoagulants (HCC) [D68.318]       Assessment and plan:  1.  Cardiac arrest.  2.  VDRF.  3.  Cardiogenic shock on 3 vasopressors.  4.  Sinus bradycardia with first-degree AV block.  5.  AICD.  6.   History of atrial fibrillation.  Currently in normal sinus rhythm post defibrillation.  7.  Anticoagulation on warfarin therapy.  8.  History of ischemic cardiomyopathy with EF of 20% and systolic CHF.    Plan:  1.  Reinterrogate reprogram AICD pacemaker for atrial rate of 60 bpm.  2.  Continue current supportive care.  3.  Titrate vasopressor support as the patient's hemodynamic status allows.    Addendum:  If subsequently spoke with the AICD device representative.  The device is functioning normally.  No evidence of treatment for ventricular arrhythmias prior to admission.  Reprogram device rate to 60 bpm.    Quality-Core Measures   Reviewed items::  EKG reviewed, Radiology images reviewed, Labs reviewed and Medications reviewed

## 2018-07-01 NOTE — PALLIATIVE CARE
Palliative Care follow-up  Met with pts sons, Memo and Rell Rolon, at the bedside. Introduced self and spoke briefly on the role of Palliative Care. Pts spouse is currently with her pother son, Mesfin at his home in [place] and will return to the bedside later this morning. Provided family with contact number and requested for call when the pts spouse, Peyton, returns. Pts sons state that they are aware of how fragile their father is but feel that their mother does not and will require a lot of support. Assured the family that we are here to support them all in this difficult time.   Provided contact number to BS RN also.    Plan: Will return to speak with pts spouse once she arrives at the bedside.     Thank you for allowing Palliative Care to participate in this patient's care. Please feel free to call [phone] with any questions or concerns.

## 2018-07-01 NOTE — PROGRESS NOTES
Insulin gtt stopped per protocol, clarified stop time for NS at 0200 as well with Pharmacist. Pharmacist clarified that NS gtt needed to be stopped at 0200. Drips discontinued per orders and protocol

## 2018-07-02 PROBLEM — G93.1 ANOXIC ENCEPHALOPATHY (HCC): Status: ACTIVE | Noted: 2018-01-01

## 2018-07-02 NOTE — PALLIATIVE CARE
"Palliative Care follow-up  Met with pts spouse, Peyton, for follow up and support. Pt continues to be in the rewarming stage. Per Peyton the pt did open his eyes today \"but that was all\". Peyton stated that she had a good discussion with her sons last night on the what ifs. Peyton stated \"I know if he doesn't wake up he wouldn't want to be on machines, I feel better being able to take my time to talk about this, I don't want to be rushed and not know what questions I have, or to wait for the last minute\". Supported Peyton with her thoughts, spoke about how much support she has with her family at her side during this. Peyton stated that at this time she does not wish to inform any extended family until she has more information. Let Peyton know that we are all here to support her  and the family as a unit. Let Peyton know that I will follow up with her tomorrow.     Updated: AMERICA RN    Plan: Will continue to remain available for pt and family to assist with POC goals as pts condition progresses.     Thank you for allowing Palliative Care to participate in this patient's care. Please feel free to call [phone] with any questions or concerns.  "

## 2018-07-02 NOTE — CARE PLAN
Problem: Infection  Goal: Will remain free from infection    Intervention: Assess signs and symptoms of infection  Daily labs and xray      Problem: Pain  Goal: Alleviation of Pain or a reduction in pain to the patient's comfort goal    Intervention: Pain Management--Medications  Fentanyl drip in use

## 2018-07-02 NOTE — CONSULTS
Reason for PC Consult: Advance Care Planning    Consulted by: Dr Ann    Assessment:  General: 67 yr old male admitted on 6/29/18 for Cardiac arrest. Hx of CKD, DM, HTN, Cardiomyopathy. Pt up visiting family from [place]. Had dinner with family and [facility], [facility] initiated in the field, brought into the ED.     Dyspnea: Yes, vented.   Last BM: 07/01/18    Pain: Unable to determine   Depression: Unable to determine  Dementia: Unable to Determine     Spiritual:  Is Holiness or spirituality important for coping with this illness? Yes, Yazidism.  Has a  or spiritual provider visit been requested? Yes    Palliative Performance Scale: 10%    Advance Directive: None on file.    DPOA: No, NOK Peyton Pérez [phone], spouse.   POLST: None on file.       Code Status: Full      Outcome:  Met with pts spouse, Peyton and family at the bedside. Introduced self and the role of Palliative Care as support for her and family during this time. Spoke about timeframe with rewarming and current status. Peyton stated that she felt torn attempting to have hope while at the same time trying to prepare herself for the worst. Provided support and validated her conflicted feelings. Spoke about her monet and extended family for support during this time. Peyton stated she would like to see a  if possible tomorrow. Let her and family know that I will speak with our . Provided family with contact number and let them know to call for any needs. Agreed to meet again tomorrow at the bedside for follow up and support.     Updated: AMERICA RN    Plan: Will follow up with family for support tomorrow, 7/2/18.     Recommendations: I do not recommend an ethics or hospice consult at this time because family is currently awaiting results of treatment before final POC decisions.     Thank you for allowing Palliative Care to participate in this patient's care. Please feel free to call [phone] with  any questions or concerns.

## 2018-07-02 NOTE — CARE PLAN
Problem: Safety  Goal: Will remain free from injury  Outcome: PROGRESSING AS EXPECTED    Goal: Will remain free from falls  Outcome: PROGRESSING AS EXPECTED      Problem: Infection  Goal: Will remain free from infection  Outcome: PROGRESSING AS EXPECTED      Problem: Venous Thromboembolism (VTW)/Deep Vein Thrombosis (DVT) Prevention:  Goal: Patient will participate in Venous Thrombosis (VTE)/Deep Vein Thrombosis (DVT)Prevention Measures  Outcome: PROGRESSING AS EXPECTED      Problem: Bowel/Gastric:  Goal: Normal bowel function is maintained or improved  Outcome: PROGRESSING AS EXPECTED    Goal: Will not experience complications related to bowel motility  Outcome: PROGRESSING AS EXPECTED

## 2018-07-02 NOTE — PROGRESS NOTES
Contacted MD regarding patient's frequent blood sugar checks. With this, new IVF orders initiated by MD. Contacted pharmacy in regards to advice regarding drips in relation to [place] and current lab values. With this, per pharmacist, recommended to hold IVF until sugar falls below 200. Will check BSG every hour to track, monitor, and trend patient's blood sugars appropriately

## 2018-07-02 NOTE — PROGRESS NOTES
[facility] Hospitalist Progress Note    Date of Service: 2018    Chief Complaint  67 y.o. male admitted 2018 with cardiac arrest     Interval Problem Update      Opens eyes    Paced at 60  Levo at 3 on vasopressin  Temp 36.5  Low UO  Rales   VD#3 PEEP 8 35%  INR 4   Day 4 of unasyn           Consultants/Specialty  Critical care  Cardiology  Palliative care    Disposition  To be determined        Review of Systems   Unable to perform ROS: Intubated      Physical Exam  Laboratory/Imaging   Hemodynamics  Temp (24hrs), Av.1 °C (96.9 °F), Min:34.5 °C (94.1 °F), Max:36.8 °C (98.2 °F)   Temperature: 36.4 °C (97.5 °F)  Pulse  Av.3  Min: 36  Max: 84 Heart Rate (Monitored): 60  Blood Pressure : (!) 92/55, Arterial BP: 101/54, NIBP: (!) 100/37 CVP (mm Hg): (!) 13 MM HG    Respiratory  Gorman Vent Mode: APVCMV, Rate (breaths/min): 18, PEEP/CPAP: 10, PEEP/CPAP: 10, FiO2: 35, P Peak (PIP): 26, P MEAN: 13   Respiration: (!) 7, Pulse Oximetry: 95 %     Work Of Breathing / Effort: Vented  RUL Breath Sounds: Clear, RML Breath Sounds: Diminished, RLL Breath Sounds: Diminished, KE Breath Sounds: Clear, LLL Breath Sounds: Diminished    Fluids    Intake/Output Summary (Last 24 hours) at 18 0728  Last data filed at 18 0600   Gross per 24 hour   Intake          2026.51 ml   Output              345 ml   Net          1681.51 ml       Nutrition  Orders Placed This Encounter   Procedures   • Diet NPO     Standing Status:   Standing     Number of Occurrences:   1     Order Specific Question:   Type:     Answer:   Now [1]     Order Specific Question:   Restrict to:     Answer:   Strict [1]     Physical Exam   Constitutional: He appears well-developed.   HENT:   Head: Normocephalic and atraumatic.   Right Ear: External ear normal.   Left Ear: External ear normal.   Nose: Nose normal.   Mouth/Throat: Oropharynx is clear and moist. No oropharyngeal exudate.   Eyes: Pupils are equal, round, and reactive to light. Right  eye exhibits no discharge. Left eye exhibits no discharge. No scleral icterus.   Pupils equal and sluggish reaction to light   Neck: No JVD present. No tracheal deviation present.   Cardiovascular: Normal rate and regular rhythm.  Exam reveals no gallop and no friction rub.    No murmur heard.  Pulmonary/Chest: No stridor. He has decreased breath sounds. He has no wheezes. He has no rhonchi. He has rales. He exhibits no tenderness.   Intubated   Abdominal: Soft. Bowel sounds are normal. He exhibits no distension. There is no tenderness. There is no rebound.   Musculoskeletal: He exhibits edema.   Neurological: A cranial nerve deficit is present. He exhibits abnormal muscle tone.   Pupils equal and reactive positive gag  absent corneal  Does not follow command or withdraw to pain     Skin: Skin is warm and dry. He is not diaphoretic. No cyanosis or erythema.   Psychiatric:   Sedated       Recent Labs      07/01/18   1700 07/02/18   0013  07/02/18   0400   WBC  14.6*  16.1*  15.9*   RBC  4.55*  4.43*  4.32*   HEMOGLOBIN  11.7*  11.2*  10.8*   HEMATOCRIT  37.8*  36.5*  35.9*   MCV  83.1  82.4  83.1   MCH  25.7*  25.3*  25.0*   MCHC  31.0*  30.7*  30.1*   RDW  52.8*  51.5*  53.3*   PLATELETCT  162*  161*  153*   MPV  10.2  10.9  10.3     Recent Labs      07/01/18   1700  07/01/18   2345  07/02/18   0400   SODIUM  136  136  137   POTASSIUM  4.2  4.5  4.5   CHLORIDE  108  109  109   CO2  17*  16*  16*   GLUCOSE  113*  72  91   BUN  51*  54*  50*   CREATININE  3.76*  4.01*  3.61*   CALCIUM  7.1*  7.2*  7.6*     Recent Labs      07/01/18   1045  07/01/18   1700  07/02/18   0013  07/02/18   0400   APTT  59.4*  74.2*  66.2*   --    INR  4.68*  4.75*  4.43*  4.12*     Recent Labs      06/29/18 2127   BNPBTYPENAT  338*     Recent Labs      06/30/18   0230  07/02/18   0400   TRIGLYCERIDE  132  161*          Assessment/Plan     * Cardiac arrest (HCC)   Assessment & Plan    Discussed with cardiology, no V. fib/V. tach on AICD  interrogation  On amiodarone drip   Discussed with Dr. Stein      Exam is concerning for anoxic encephalopathy discussed with family we will await they would like to wait until 72 hours and reevaluate depending on his clinical and neurologic exam at that time        Acute hypoxemic respiratory failure (HCC)   Assessment & Plan    Vent management per critical care discussed with Dr. Gonda        Hypotension   Assessment & Plan    Cardiogenic shock    Wean off pressors as tolerated        Anemia- (present on admission)   Assessment & Plan    No signs of bleeding hemoglobin stable        CKD (chronic kidney disease) stage 3, GFR 30-59 ml/min- (present on admission)   Assessment & Plan    Now with acute kidney injury likely secondary to ATN in setting of cardiac arrest and shock  Given poor urine output may end up needing hemodialysis  Monitor electrolytes renal function and urine output  Nephrology following        Aspiration pneumonia (HCC)   Assessment & Plan    Continue Unasyn complete 5 day course        Ischemic cardiomyopathy   Assessment & Plan    EF 20%  Assess response to IV Lasix          Atrial fibrillation (HCC)   Assessment & Plan    Paroxysmal    Resume warfarin when INR less than 3  Remains on amiodarone drip        Type 2 diabetes mellitus (HCC)- (present on admission)   Assessment & Plan    Uncontrolled with hyperglycemia     Monitor CBGs blood sugars labile continue sliding-scale insulin for now        Hyperlipidemia- (present on admission)   Assessment & Plan    Continue atorvastatin        Circulating anticoagulants (HCC)- (present on admission)   Assessment & Plan    Warfarin on hold given supratherapeutic INR continue to monitor          Quality-Core Measures   Reviewed items::  Labs reviewed, Medications reviewed and Radiology images reviewed  Pedraza catheter::  Critically Ill - Requiring Accurate Measurement of Urinary Output and Unconscious / Sedated Patient on a Ventilator  Central line in  place:  Shock  DVT prophylaxis pharmacological::  Warfarin (Coumadin)  Antibiotics:  Treating active infection/contamination beyond 24 hours perioperative coverage      Overall prognosis is guarded, discussed prognosis and treatment options with patient's family at his bedside  Also discussed with nursing staff pharmacist cardiology and critical care

## 2018-07-02 NOTE — DIETARY
"Nutrition Support Assessment     Nutrition services:   Nutrition services: Day 2 of admit.  Rell Pérez is a 67 y.o. male with admitting DX of Cardiac arrest (HCC)  Hx of CKD, diabetes mellitus on insulin regimen, hypertension.     Current problem list:  1. Cardiac arrest  2. Hypotension  3. VDRF  4. Cardiogenic shock  5. CKD stage 3  6. A-fib  7. Ischemic cardiomyopathy   8. Hyperlipidemia  9. Type 2 diabetes mellitus      Assessment:  Estimated Nutritional Needs based on:   Height: 175.3 cm (5' 9\")  Weight: (!) 127.4 kg (280 lb 13.9 oz)  Weight to Use in Calculations: 115.5 kg (254 lb 10.1 oz) (Pt is ~6 L fluid positive, per I/O's) - patient appears closer to this weight per RD observation.   Ideal Body Weight: 72.7 kg (160 lb 5.1 oz)  Percent Ideal Body Weight: 175.2  BMI: 37.6    Calculation/Equation: PSU = 2139 kcal/day; per MSJ x1 = 1912 kcal/day (Temp (24hrs), Av.4 °C (97.5 °F), Min:35.7 °C (96.3 °F), Max:37 °C (98.2 °F)  Total Calories / day: 1390 - 1497; 1617 kcal (14 kcal/kg) (65-70% REE, per obesity guidelines; patient appears muscular, per RD obs).   Total Grams Protein / day: 109 - 145  (Grams Protein / kg IBW: 1.5 - 2.0)  0.6 - 0.8 g protein/kg, per CKD stage 3 guidelines      Evaluation:   1. Indication for nutrition support: pt intubated, unsafe for PO diet.   2.  Enteral access: Cortrak is currently pending, per discussion in IDT rounds.   3. Meds: Pepcid, Lasix, regular insulin, senna-docusate, D5 1/2 NS @ 40 ml/hr, Levophed, vasostrict   -D5 providing 163 kcal/ 48 grams dextrose/day    -phos Lo d/c'd   4. Labs: BUN 50, creatinine 3.6, GFR 17, phosphorus 7, magnesium 3.6, triglycerides 161    -renal friendly TF formula most appropriate at this time d/t hyperphosphatemia    -fiber-free TF formula to aid in promoting GI tolerance, given pressor requirements.    -given GFR <25, restricting protein to 0.8 g/kg Act BW/day      Recommendations/Plan:  1. Start Novasource Renal at 25 ml/hr and " advance to goal rate of 30 ml/hr = 1440 kcal (+kcal from dextrose and Beneprotein), 65 grams protein, 518 mL free water.   -RD monitoring GI tolerance, given higher fat content  2. While patient receiving Novasource Renal TF, please administer Beneprotein packets x4/day (1 packet q6hrs) = 100 kcal, 24 grams protein/day.  -TF + Beneprotein packets will provide 0.77 g/kg  3. Fluids per MD.   4. RD monitoring renal status; will change TF prn.

## 2018-07-02 NOTE — PROGRESS NOTES
Nephrology Progress Note, Adult, Complex               Author: Leena Santosvitorcarlos Date & Time created: 7/2/2018  3:48 PM     Interval History:  66 y/o male with HTN, DM II, CKD -suffered from cardiac arrest -s/p hypothermia protocol  In [place]  Intub/vent  Worsening edema  Creat better  Poor UOP  Review of Systems:  Review of Systems   Reason unable to perform ROS: intubated.       Physical Exam:  Physical Exam   Constitutional: He appears well-developed and well-nourished. No distress.   HENT:   Head: Normocephalic and atraumatic.   Nose: Nose normal.   Mouth/Throat: No oropharyngeal exudate.   ETT   Eyes: Conjunctivae are normal. Pupils are equal, round, and reactive to light. No scleral icterus.   Neck: No thyromegaly present.   Cardiovascular: Normal rate and regular rhythm.  Exam reveals no gallop and no friction rub.    Pulmonary/Chest:   Coarse BS  vented   Nursing note and vitals reviewed.      Labs:  Recent Labs      06/30/18   0409  07/01/18   0332  07/02/18   0432   ISTATAPH  7.345*  7.476  7.336*   ISTATAPCO2  44.4*  22.9*  33.0   ISTATAPO2  91*  180*  80   ISTATATCO2  26  18*  19*   [id number]  97  100*  95   ISTATARTHCO3  24.2  16.9*  17.6   ISTATARTBE  -2  -5*  -7*   ISTATTEMP  35.8 C  35.8 C  36.0 C   ISTATFIO2  100  70  40   ISTATSPEC  Arterial  Arterial  Arterial   ISTATAPHTC  7.362*  7.494  7.350*   [id number]  85  174*  75     Recent Labs      06/29/18   2127  06/30/18   0230   TROPONINI  0.24*  3.29*   BNPBTYPENAT  338*   --      Recent Labs      07/01/18   0345   07/01/18   1700  07/01/18   2345  07/02/18   0400   SODIUM  136   < >  136  136  137   POTASSIUM  4.4   < >  4.2  4.5  4.5   CHLORIDE  109   < >  108  109  109   CO2  16*   < >  17*  16*  16*   BUN  48*   < >  51*  54*  50*   CREATININE  3.45*   < >  3.76*  4.01*  3.61*   MAGNESIUM  3.8*   < >  3.7*  3.7*  3.6*   PHOSPHORUS  4.7*   --    --   6.6*  7.0*   CALCIUM  7.1*   < >  7.1*  7.2*  7.6*    < > = values in this interval not displayed.      Recent Labs      18   18118   17018   2345  18   0400   ALTSGPT  24   --    --    --   18   ASTSGOT  37   --    --    --   27   ALKPHOSPHAT  61   --    --    --   58   TBILIRUBIN  0.8   --    --    --   1.0   GLUCOSE  267*   < >  113*  72  91    < > = values in this interval not displayed.     Recent Labs      18   1045  18   0013  18   0400   RBC  4.56*  4.55*  4.43*  4.32*   HEMOGLOBIN  11.4*  11.7*  11.2*  10.8*   HEMATOCRIT  37.3*  37.8*  36.5*  35.9*   PLATELETCT  177  162*  161*  153*   PROTHROMBTM  43.9*  44.4*  42.0*  39.7*   APTT  59.4*  74.2*  66.2*   --    INR  4.68*  4.75*  4.43*  4.12*     Recent Labs      18   0345   18   0013  18   0400   WBC  14.4*   < >  12.8*   < >  14.6*  16.1*  15.9*   NEUTSPOLYS  83.40*   --   81.20*   --    --    --   82.90*   LYMPHOCYTES  5.60*   --   6.40*   --    --    --   4.70*   MONOCYTES  9.10   --   8.20   --    --    --   8.80   EOSINOPHILS  0.80   --   3.00   --    --    --   1.90   BASOPHILS  0.30   --   0.50   --    --    --   0.40   ASTSGOT  37   --    --    --    --    --   27   ALTSGPT  24   --    --    --    --    --   18   ALKPHOSPHAT  61   --    --    --    --    --   58   TBILIRUBIN  0.8   --    --    --    --    --   1.0    < > = values in this interval not displayed.           Hemodynamics:  Temp (24hrs), Av.5 °C (97.7 °F), Min:36 °C (96.8 °F), Max:36.8 °C (98.2 °F)  Temperature: 36.4 °C (97.5 °F)  Pulse  Av.4  Min: 36  Max: 84Heart Rate (Monitored): 60  Blood Pressure : (!) 92/55, Arterial BP: 101/54, NIBP: (!) 100/37  CVP (mm Hg): (!) 13 MM HG  Respiratory:  Gorman Vent Mode: APVCMV, Rate (breaths/min): 18, PEEP/CPAP: 8, FiO2: 35, P Peak (PIP): 22, P MEAN: 11 Respiration: 19, Pulse Oximetry: 94 %     Work Of Breathing / Effort: Vented  RUL Breath Sounds: Clear, RML Breath Sounds: Diminished, RLL Breath Sounds: Diminished,  KE Breath Sounds: Crackles, LLL Breath Sounds: Diminished  Fluids:    Intake/Output Summary (Last 24 hours) at 07/02/18 1548  Last data filed at 07/02/18 1000   Gross per 24 hour   Intake          1828.51 ml   Output              390 ml   Net          1438.51 ml     Weight: (!) 127.4 kg (280 lb 13.9 oz)  GI/Nutrition:  Orders Placed This Encounter   Procedures   • Diet NPO     Standing Status:   Standing     Number of Occurrences:   1     Order Specific Question:   Type:     Answer:   Now [1]     Order Specific Question:   Restrict to:     Answer:   Strict [1]     Medical Decision Making, by Problem:  Active Hospital Problems    Diagnosis   • *Cardiac arrest (HCC) [I46.9]   • Hypotension [I95.9]   • Acute hypoxemic respiratory failure (HCC) [J96.01]   • Cardiogenic shock (HCC) [R57.0]   • Anemia [D64.9]   • CKD (chronic kidney disease) stage 3, GFR 30-59 ml/min [N18.3]   • Anoxic encephalopathy (HCC) [G93.1]   • Aspiration pneumonia (HCC) [J69.0]   • Atrial fibrillation (HCC) [I48.91]   • Ischemic cardiomyopathy [I25.5]   • Hyperlipidemia [E78.5]   • Type 2 diabetes mellitus (HCC) [E11.9]   • Circulating anticoagulants (HCC) [D68.318]       Quality-Core Measures   Reviewed items::  Labs reviewed and Medications reviewed  Pedraza catheter::  Critically Ill - Requiring Accurate Measurement of Urinary Output    Assessment and plan  1.ARABELLA/CKD/ATN  -to monitor  2.Hypotension: to keep MAP> 65  3.Electrolytes: well controlled.  4.Anemia: drop in Hb level -to monitor  5.Volume:overloaded -added lasix    Recs: no need for emergent dialysis             However may need RRT if no improvement in UOP, and creat             Daily BMP

## 2018-07-02 NOTE — DISCHARGE PLANNING
Medical SW    Sw attended AM IDT Rounds.    RN reports, pt family at bedside, on code chill protocol, able to open eyes to voice and painful stimuli. Pt has pupillary reflex, but no corneal, no gag, no purposeful movement.     Plan: Sw to assist w/ d/c planning as needed.

## 2018-07-02 NOTE — PROGRESS NOTES
Touched base again with MD and pharmacist regarding patient's blood sugars. IVF initiated, however had questions regarding the varying degrees of patient's blood sugars. MD and pharmacist agreed that current course of action has been appropriate and close monitoring is what would be best for patient at this time. With this, IVF infusing as ordered and will continue with every hour AccuChecks to ensure patient's blood sugar remains at an appropriate level for maximum potentiality of progression of care. Will continue to monitor and assess

## 2018-07-02 NOTE — PROGRESS NOTES
"Cardiology Progress Note               Author: Sydni Stein Date & Time created: 2018  10:19 AM     Interval History:  No noted significant ectopy  Still on amio gtt    Chief Complaint:    \"OOHA\" arrest      Review of Systems   Unable to perform ROS: Intubated       Physical Exam   Constitutional:   Chronically ill, intubated   HENT:   Head: Atraumatic.   Eyes: EOM are normal. Pupils are equal, round, and reactive to light. No scleral icterus.   Neck: No thyromegaly present.   Cardiovascular: Normal rate and regular rhythm.  Exam reveals no friction rub.    No murmur heard.  Abdominal: Soft. He exhibits no distension.   Musculoskeletal: He exhibits no edema or deformity.   Neurological: He exhibits normal muscle tone. Coordination normal.   Skin: Skin is warm and dry.       Hemodynamics:  Temp (24hrs), Av.3 °C (97.4 °F), Min:35.5 °C (95.9 °F), Max:36.8 °C (98.2 °F)  Temperature: 36.4 °C (97.5 °F)  Pulse  Av.4  Min: 36  Max: 84Heart Rate (Monitored): 62  Blood Pressure : (!) 92/55, Arterial BP: 101/54, NIBP: (!) 100/37  CVP (mm Hg): (!) 13 MM HG  Respiratory:  Gorman Vent Mode: APVCMV, Rate (breaths/min): 18, PEEP/CPAP: 8, FiO2: 30, P Peak (PIP): 18, P MEAN: 11 Respiration: 19, Pulse Oximetry: 100 %     Work Of Breathing / Effort: Vented  RUL Breath Sounds: Clear, RML Breath Sounds: Diminished, RLL Breath Sounds: Diminished, KE Breath Sounds: Clear, LLL Breath Sounds: Diminished  Fluids:     Weight: (!) 127.4 kg (280 lb 13.9 oz)  GI/Nutrition:  Orders Placed This Encounter   Procedures   • Diet NPO     Standing Status:   Standing     Number of Occurrences:   1     Order Specific Question:   Type:     Answer:   Now [1]     Order Specific Question:   Restrict to:     Answer:   Strict [1]     Lab Results:  Recent Labs      18   1700  18   0013  18   0400   WBC  14.6*  16.1*  15.9*   RBC  4.55*  4.43*  4.32*   HEMOGLOBIN  11.7*  11.2*  10.8*   HEMATOCRIT  37.8*  36.5*  35.9*   MCV  " "83.1  82.4  83.1   MCH  25.7*  25.3*  25.0*   MCHC  31.0*  30.7*  30.1*   RDW  52.8*  51.5*  53.3*   PLATELETCT  162*  161*  153*   MPV  10.2  10.9  10.3     Recent Labs      07/01/18   1700  07/01/18   2345  07/02/18   0400   SODIUM  136  136  137   POTASSIUM  4.2  4.5  4.5   CHLORIDE  108  109  109   CO2  17*  16*  16*   GLUCOSE  113*  72  91   BUN  51*  54*  50*   CREATININE  3.76*  4.01*  3.61*   CALCIUM  7.1*  7.2*  7.6*     Recent Labs      07/01/18   1045  07/01/18   1700  07/02/18   0013  07/02/18   0400   APTT  59.4*  74.2*  66.2*   --    INR  4.68*  4.75*  4.43*  4.12*     Recent Labs      06/29/18 2127   BNPBTYPENAT  338*     Recent Labs      06/29/18 2127  06/30/18   0230   TROPONINI  0.24*  3.29*   BNPBTYPENAT  338*   --      Recent Labs      06/30/18   0230  07/02/18   0400   TRIGLYCERIDE  132  161*         Medical Decision Making, by Problem:  Active Hospital Problems    Diagnosis   • *Cardiac arrest (HCC) [I46.9]   • Hypotension [I95.9]   • Acute hypoxemic respiratory failure (HCC) [J96.01]   • Cardiogenic shock (HCC) [R57.0]   • Anemia [D64.9]   • CKD (chronic kidney disease) stage 3, GFR 30-59 ml/min [N18.3]   • Aspiration pneumonia (HCC) [J69.0]   • Atrial fibrillation (HCC) [I48.91]   • Ischemic cardiomyopathy [I25.5]   • Hyperlipidemia [E78.5]   • Type 2 diabetes mellitus (HCC) [E11.9]   • Circulating anticoagulants (HCC) [D68.318]       Plan:    Unfortunate 68yo from [facility] where he is followed for his longstanding ICM with remte CABG with single lead ICD, and persistent AF & CKD.  [place] - witnessed collapse.    Per chart & interrogation of device -- no noted VF/VT but he was shocked by EMTs.  Code in the ER was asystolic, no abnormalities on the ECG which was sinus with long DE and PVCs.    \"OOHA\"  No evidence of cardiac primary pathology --- deiniftely not VT/VF and labs/ECGs dont support MI or ACS (trop peaked at 3.3, which is consistent with his " "repeated CPR)  No add'l cardiac interventinwarranted or perceived t be life improving    CHF/ICM  Echo with continued LVEF <20, no change from prior [place] echo    AF  Back in sinus after DCCV  Incidentally noted at [place] recently  On warfarin  On amio gtt for this  Supportive care    End of life care  Recommend compassinate care  Primary team following -- per Dr Tovar (Cardiologys conversation with ER team:  \"Goals of care - wife is DPOA, patient was supposedly DNR/DNI per advanced directive, however they do not have this with them.  Per family he wanted his entire family to be able to say bye to him if he were in a situation like this.  He had recent conversations about exactly this sort of situation with his son.\"      Quality-Core Measures  "

## 2018-07-02 NOTE — PROGRESS NOTES
"Pulmonary Critical Care Progress Note      Date of admission:  6/29/2018    Chief Complaint:  Respiratory failure    HPI: 67-year-old male with a known history   of diabetes, COPD, hypertension, 3-vessel coronary artery disease with a LIMA   to the LAD, SVGs to obtuse marginal and PDA in 04/2001, atrial fibrillation,   chronic kidney disease, ischemic cardiomyopathy with AICD pacer that presented   in cardiac arrest.  Patient's history was obtained from the family as well as   Dr. Mcdonnell from the emergency department.  The patient according to the son   [place]ally [place] and was traveling to [place] to see his family.    He saw his son and then after dinner the patient wanted to go gambling and   once the son parked the car, he found that his dad had already passed out and   had a cardiopulmonary arrest.  He feels that they ate dinner at approximately   05:00-06:00 p.m. and then the visitation with the grandkids and then going to   the [employer] was between 08:00 and 08:30 p.m.  The patient did have a bystander   CPR, although AICD was then placed and it suggested being shocked.  He was   given at least 2 rounds of epinephrine and Narcan en route.  At least one AED   shock was given.  He did develop a pulse.  While in the emergency department,   he was intubated and a femoral line was placed.  I believe he may have had a   left IO line placed during the arrest.  The patient is on Coumadin for his   underlying AICD pacer AFib and according to the son, he has normally taking   his anticoagulant medications routinely.       Interval Events:  24 hour interval history reviewed   - rewarmed in last 24 hrs (1400), remains AF   - CVP 13-15, levophed gtt @ 3, vaso gtt, amio @ 1   - WBC remains elevated   - plts low at 153   - ARABELLA unchanged, low UOP   - low sedation overnight for \"vent overbreathing\"   - opens eyes to voice and pain but no following, no purposeful movements   - paced at 60   - OGT to LCWS, BMs ok   - " albumin low at 1.7   - EF 15%   - D51/2NS @ 40 for persistent hypoglycemia in last 2 hrs   - unasyn day 3/5    Review of Systems   Unable to perform ROS: Critical illness     Physical Exam   Constitutional: He is well-developed, well-nourished, and in no distress. He is intubated.   Critically ill appearing with ongoing full vent and chemical support   HENT:   Head: Normocephalic and atraumatic.   Mouth/Throat: Oropharynx is clear and moist.   ETT and NGT in place   Eyes: Conjunctivae are normal. Pupils are equal, round, and reactive to light. No scleral icterus.   Neck: Neck supple. No JVD present.   Cardiovascular: Normal rate and intact distal pulses.   Occasional extrasystoles are present. PMI is displaced.  Exam reveals distant heart sounds. Exam reveals no gallop and no friction rub.    Murmur heard.  Paced rhythm, sternotomy scar noted   Pulmonary/Chest: He is intubated. No respiratory distress. He has decreased breath sounds. He has no wheezes. He has no rhonchi. He has rales (Scattered coarse crackles bilaterally) in the right lower field and the left lower field.   Abdominal: Soft. Bowel sounds are normal. He exhibits no distension. There is no tenderness. There is no guarding.   Genitourinary: Penis normal.   Genitourinary Comments: Pedraza cath in place   Musculoskeletal: He exhibits edema. He exhibits no tenderness or deformity.   No clubbing or cyanosis, (+) hand and LE edema   Lymphadenopathy:     He has no cervical adenopathy.   Neurological:   Opens eyes to pain and voice but not tracking, not following commands with minimal withdrawal to deep pain   Skin: Skin is warm and dry. No erythema.   Psychiatric:   Unable to assess given current clinical condition   Nursing note and vitals reviewed.      PFSH:  No change.    Respiratory:  Gorman Vent Mode: APVCMV, Rate (breaths/min): 18, Vt Target (mL): 440, PEEP/CPAP: 10, FiO2: 35, Static Compliance (ml / cm H2O): 65.4  Pulse Oximetry: 95 % PIP 28  CXR  images personally reviewed and compared to prior images: Unchanged mild edema pattern with enlarged cardiac and midsternal silhouettes, bibasilar atelectasis, sternotomy wires noted, endotracheal tube/gastric tube/right IJ central venous catheter and pacemaker in good position    Recent Labs      06/30/18   0409  07/01/18   0332  07/02/18   0432   ISTATAPH  7.345*  7.476  7.336*   ISTATAPCO2  44.4*  22.9*  33.0   ISTATAPO2  91*  180*  80   ISTATATCO2  26  18*  19*   [id number]  97  100*  95   ISTATARTHCO3  24.2  16.9*  17.6   ISTATARTBE  -2  -5*  -7*   ISTATTEMP  35.8 C  35.8 C  36.0 C   ISTATFIO2  100  70  40   ISTATSPEC  Arterial  Arterial  Arterial   ISTATAPHTC  7.362*  7.494  7.350*   [id number]  85  174*  75   ABG interpretation: Partially compensated metabolic acidosis with adequate oxygenation    HemoDynamics:  Pulse: 60, Heart Rate (Monitored): 60  Blood Pressure : (!) 92/55, Arterial BP: 101/54, NIBP: (!) 100/37  CVP (mm Hg): (!) 13 MM HG  Norepinephrine @ 3  Vasopressin 0.03  Amiodarone at 1    Imaging: Echo 7/1 - CONCLUSIONS  Compared to the report of the study done on 10/29/2017 there has been   no significant change.  Severely reduced left ventricular systolic function. Left ventricular   ejection fraction is visually estimated to be 15-20%.   No evidence of valvular abnormality based on Doppler evaluation.   Estimated right ventricular systolic pressure is 45 mmHg.    Recent Labs      06/29/18   2127  06/30/18   0230   TROPONINI  0.24*  3.29*   BNPBTYPENAT  338*   --        Neuro:  Imaging: CT head 6/29 -    1.  No acute intracranial abnormality is identified, there are changes compatible with small vessel ischemic disease and moderate atrophy.   2.  Chronic sinusitis changes of the right frontal, right maxillary, and left sphenoid sinus.    Sedated on propofol and fentanyl drips      Fluids:  Intake/Output       06/30/18 0700 - 07/01/18 0659 07/01/18 0700 - 07/02/18 0659 07/02/18 0700 - 07/03/18  0659 0700-1859 1900-0659 Total 0700-1859 1900-0659 Total 0700-1859 1900-0659 Total       Intake    P.O.  --  0 0  --  0 0  --  -- --    P.O. -- 0 0 -- 0 0 -- -- --    I.V.  1691.2  1971 3662.2  1170  856.5 2026.5  --  -- --    Magnesium Sulfate Volume 187.5 -- 187.5 -- -- -- -- -- --    PCA End of Shift Total Volume (ml) 9 -- 9 -- -- -- -- -- --    Amiodarone Volume 299.8 495 794.8 330 363 693 -- -- --    Vasopressin Volume 106.1 108 214.1 93.6 90 183.6 -- -- --    Phenylephrine Volume 327.7 231.8 559.5 188.6 37.5 226.1 -- -- --    Propofol Volume 178.8 185.8 364.6 149.2 57.4 206.6 -- -- --    Norepinephrine Volume 448.3 378.3 826.6 408.5 308.6 717.2 -- -- --    Insulin Volume 134.1 572.1 706.2 -- -- -- -- -- --    Other  --  0 0  --  0 0  --  -- --    Medications (P.O./ Enteral Liquids) -- 0 0 -- 0 0 -- -- --    Enteral  --  0 0  --  -- --  --  -- --    Free Water / Tube Flush -- 0 0 -- -- -- -- -- --    Total Intake 1691.2 1971 3662.2 1170 856.5 2026.5 -- -- --       Output    Urine  115  55 170  110  130 240  --  -- --    Output (mL) (Urinary Catheter Indwelling Catheter 16) 115 55 170 110 130 240 -- -- --    Stool  --  -- --  --  -- --  --  -- --    Number of Times Stooled 2 x 0 x 2 x 1 x 0 x 1 x -- -- --    Emesis/NG output  100  15 115  --  105 105  --  -- --    Output (mL) (Enteral Tube Oral) 100 15 115 -- 105 105 -- -- --    Total Output 215 70 285 110 235 345 -- -- --       Net I/O     1476.2 1901 3377.2 1060 621.5 1681.5 -- -- --        Weight: (!) 127.4 kg (280 lb 13.9 oz)  Recent Labs      07/01/18   0345   07/01/18   1700  07/01/18   2345  07/02/18   0400   SODIUM  136   < >  136  136  137   POTASSIUM  4.4   < >  4.2  4.5  4.5   CHLORIDE  109   < >  108  109  109   CO2  16*   < >  17*  16*  16*   BUN  48*   < >  51*  54*  50*   CREATININE  3.45*   < >  3.76*  4.01*  3.61*   MAGNESIUM  3.8*   < >  3.7*  3.7*  3.6*   PHOSPHORUS  4.7*   --    --   6.6*  7.0*   CALCIUM  7.1*   < >  7.1*  7.2*  7.6*     < > = values in this interval not displayed.       GI/Nutrition:  Imaging: Reviewed    N.p.o. with NG tube to low continuous wall suction  Liver Function  Recent Labs      18   1819   18   17018   2345  18   0400   ALTSGPT  24   --    --    --   18   ASTSGOT  37   --    --    --   27   ALKPHOSPHAT  61   --    --    --   58   TBILIRUBIN  0.8   --    --    --   1.0   GLUCOSE  267*   < >  113*  72  91    < > = values in this interval not displayed.       Heme:  Recent Labs      18   1045  18   17018   0013  18   0400   RBC  4.56*  4.55*  4.43*  4.32*   HEMOGLOBIN  11.4*  11.7*  11.2*  10.8*   HEMATOCRIT  37.3*  37.8*  36.5*  35.9*   PLATELETCT  177  162*  161*  153*   PROTHROMBTM  43.9*  44.4*  42.0*  39.7*   APTT  59.4*  74.2*  66.2*   --    INR  4.68*  4.75*  4.43*  4.12*       Infectious Disease:  Temp  Av.2 °C (97.1 °F)  Min: 34.7 °C (94.5 °F)  Max: 36.8 °C (98.2 °F)  Reviewed.  No growth to date  Recent Labs      18   18118   0345   18   0013  18   0400   WBC  14.4*   < >  12.8*   < >  14.6*  16.1*  15.9*   NEUTSPOLYS  83.40*   --   81.20*   --    --    --   82.90*   LYMPHOCYTES  5.60*   --   6.40*   --    --    --   4.70*   MONOCYTES  9.10   --   8.20   --    --    --   8.80   EOSINOPHILS  0.80   --   3.00   --    --    --   1.90   BASOPHILS  0.30   --   0.50   --    --    --   0.40   ASTSGOT  37   --    --    --    --    --   27   ALTSGPT  24   --    --    --    --    --   18   ALKPHOSPHAT  61   --    --    --    --    --   58   TBILIRUBIN  0.8   --    --    --    --    --   1.0    < > = values in this interval not displayed.     Current Facility-Administered Medications   Medication Dose Frequency Provider Last Rate Last Dose   • D5 1/2 NS infusion   Continuous ARISTIDES Manzano.OMirza 40 mL/hr at 18 0320     • insulin regular (HUMULIN R) injection 2-9 Units  2-9 Units Q6HRS Art Reed M.D.    Stopped at 07/01/18 1800    And   • glucose 4 g chewable tablet 16 g  16 g Q15 MIN PRN Art Reed M.D.        And   • dextrose 50% (D50W) injection 25 mL  25 mL Q15 MIN PRN Art Reed M.D.   25 mL at 07/02/18 0005   • ampicillin/sulbactam (UNASYN) 3 g in  mL IVPB  3 g Q12HRS Quinn Lauren M.D.   Stopped at 07/01/18 2140   • propofol (DIPRIVAN) injection  0-80 mcg/kg/min Continuous Yury Tovar M.D.   Stopped at 07/02/18 0738   • Respiratory Care per Protocol   Continuous RT Dwight Ann D.O.       • senna-docusate (PERICOLACE or SENOKOT S) 8.6-50 MG per tablet 2 Tab  2 Tab BID Dwight Ann D.O.   Stopped at 06/30/18 0900    And   • polyethylene glycol/lytes (MIRALAX) PACKET 1 Packet  1 Packet QDAY PRN ARISTIDES Manzano.O.        And   • magnesium hydroxide (MILK OF MAGNESIA) suspension 30 mL  30 mL QDAY PRN AKHIL ManzanoO.        And   • bisacodyl (DULCOLAX) suppository 10 mg  10 mg QDAY PRN ARISTIDES Manzano.O.       • fentaNYL (SUBLIMAZE) injection 25 mcg  25 mcg Q HOUR PRN AKHIL ManzanoO.        Or   • fentaNYL (SUBLIMAZE) injection 50 mcg  50 mcg Q HOUR PRN AKHIL ManzanoO.        Or   • fentaNYL (SUBLIMAZE) injection 100 mcg  100 mcg Q HOUR PRN ARISTIDES Manzano.O.       • fentaNYL (SUBLIMAZE) 50 mcg/mL in 50mL   Continuous AKHIL ManzanoO.   Stopped at 07/02/18 0709   • ipratropium-albuterol (DUONEB) nebulizer solution  3 mL Q2HRS PRN (RT) Dwight Ann D.O.       • ipratropium-albuterol (DUONEB) nebulizer solution  3 mL Q4HRS (RT) AKHIL ManzanoOMirza   3 mL at 07/02/18 0606   • famotidine (PEPCID) tablet 20 mg  20 mg DAILY Dwight Ann D.O.        Or   • famotidine (PEPCID) injection 20 mg  20 mg DAILY AKHIL ManzanoOMirza   20 mg at 07/02/18 0451   • aspirin (ASA) tablet 325 mg  325 mg DAILY AKHIL ManzanoOMirza   325 mg at 07/01/18 1013   • atorvastatin (LIPITOR) tablet 80 mg  80 mg DAILY AKHIL ManzanoOMirza   80 mg at  07/01/18 1013   • vasopressin (VASOSTRICT) 20 Units in  mL Infusion  0.03 Units/min Continuous Dwight Ann D.O. 9 mL/hr at 07/02/18 0219 0.03 Units/min at 07/02/18 0219   • norepinephrine (LEVOPHED) 16 mg in  mL Infusion  0-30 mcg/min Continuous Art Reed M.D. 9.4 mL/hr at 07/02/18 0738 5 mcg/min at 07/02/18 0738   • amiodarone (CORDARONE) 450 mg in D5W 250 mL Infusion  0.5-1 mg/min Continuous Yury Tovar M.D. 33 mL/hr at 07/02/18 0030 1 mg/min at 07/02/18 0030     Last reviewed on 6/30/2018 10:29 AM by Oscar Broussard RCOLLETTE    Quality  Measures:  Labs reviewed, Medications reviewed and Radiology images reviewed  Pedraza catheter: Critically Ill - Requiring Accurate Measurement of Urinary Output and Unconscious / Sedated Patient on a Ventilator  Central line in place: Need for access, Vasopressors and Shock    DVT Prophylaxis: Heparin  DVT prophylaxis - mechanical: SCDs  Ulcer prophylaxis: Yes  Antibiotics: Treating active infection/contamination beyond 24 hours perioperative coverage  Assessed for rehab: Patient unable to tolerate rehabilitation therapeutic regimen      Assessment and Plan:  Acute hypoxemic respiratory failure - Intubated 6/29   -Continue full vent support decrease PEEP to 8, wean FiO2   - RT/O2 protocols   - start SBTs   - limit all sedatives   - start diuresis today  Out of hospital cardiac arrest, likely V-fib or tach given AED firing s/p ROSC   - s/p TTM   - amio gtt, anticoagulation plan?   - supportive care with close neurologically monitoring, concern for possible anoxic brain injury  Coronary artery disease with prior CABG   -Continue aspirin and statin  Acute on chronic systolic heart failure -EF 15-20%   -S/P prior ICD placement   - eventual BB and/or ACE-I when stabilized  Cardiogenic shock - persistent   -Titrating vasopressor support with norepinephrine and vasopressin to goal MAP >65   - no further IVFs as currently hypervolemic  Query anoxic  encephalopathy   -limit sedatives   -Serial neurologic exams   - possible EEG in 1-2 days if needed, no MRI given AICD  Acute on chronic kidney disease - slight improvement   -Renal dose medications   -Avoid nephrotoxins   -Follow renal function  Possible aspiration pneumonia   -Continue Unasyn x 5 days, f/u cultures  Diabetes mellitus type 2   - cont sliding scale insulin  History of hypertension   -Now in cardiogenic shock with high-dose vasopressor support   - eventually resume anti-HTN if needed  Dyslipidemia   -Continue statin  COPD without acute exacerbation   - cont BDs, no steroids  History of atrial fibrillation - rate controlled currently   -Chronically anticoagulant with warfarin, on hold for now  Hyperphosphatemia - phosLo in discussion with nephro if able to give through [facility]  Thrombocytopenia - monitor for bleeding  Hypoglycemia - cont D 1/2 NS MIVFs for now  Severe protein calorie malnutrition - optimize nutritional status, nutrition consultation  Prophylaxis, start enteral nutrition    I have assessed and reassessed his respiratory status with ventilator adjustments, ventilator waveforms, airway mechanics, hemodynamics, blood pressure, cardiovascular status with titration of vasopressors and neurologic status.  He is at increased risk for worsening cardiovascular, respiratory and CNS system dysfunction.    High risk of deterioration and worsening vital organ dysfunction and death without the above critical care interventions.    Critical Care Time:  40 minutes  [id number]  No time overlap  Time excludes procedures  Discussed with RN, RT, Team, Clinical pharmacist, Hospitalist, wife

## 2018-07-03 PROBLEM — N17.9 ACUTE RENAL FAILURE (ARF) (HCC): Status: ACTIVE | Noted: 2018-01-01

## 2018-07-03 PROBLEM — I95.9 HYPOTENSION: Status: RESOLVED | Noted: 2018-01-01 | Resolved: 2018-01-01

## 2018-07-03 NOTE — CARE PLAN
Problem: Ventilation Defect:  Goal: Ability to achieve and maintain unassisted ventilation or tolerate decreased levels of ventilator support  Outcome: PROGRESSING AS EXPECTED  Adult Ventilation Update    Total Vent Days: 5    Patient Lines/Drains/Airways Status    Active Airway     Name: Placement date: Placement time: Site: Days:    Airway Group ET Tube Oral 8.0 06/29/18 2131   Oral   5              In the last 24 hours, the patient tolerated SBT for 1 on settings of 5/8.    #FVC / Vital Capacity (liters) : 500 (07/02/18 1640)  NIF (cm H2O) : -36 (07/02/18 1640)  Rapid Shallow Breathing Index (RR/VT): 45 (07/02/18 1640)  Plateau Pressure (Q Shift): 19 (07/02/18 0606)  Static Compliance (ml / cm H2O): 130 (07/02/18 2250)      Barriers to SBT Weaning Trial Stopped due to:: Pt weaned for 1 hour and returned to rest settings per protocol (07/02/18 1640)  Length of Weaning Trial Length of Weaning Trial (Hours): 1 (07/02/18 1640)         Sputum/Suction   Cough: Productive (07/03/18 0000)  Sputum Amount: Moderate (07/03/18 0000)  Sputum Color: Bloody;Yellow (07/03/18 0000)  Sputum Consistency: Thick (07/03/18 0000)    Mobility  Level of Mobility: Level I (07/02/18 2100)  Activity Performed: Unable to mobilize (07/02/18 2100)  Pt Calls for Assistance: No (07/02/18 1600)  Staff Present for Mobilization: RN (07/02/18 2100)  Gait: Unable to Ambulate (07/03/18 0000)  Assistive Devices: None (07/03/18 0000)  Reason Not Mobilized: Unstable condition (07/02/18 2100)  Mobilization Comments: PEEP of 10, RASS of -5 (07/02/18 2100)    APVCMV 18/440/+8/35%  Duo Q4 3% sodioum chloride BID

## 2018-07-03 NOTE — DISCHARGE PLANNING
Medical SW    Sw attended AM IDT Rounds.    RN reports, pt on code chill, family at bedside, has pupil and gag reflexes no corneal reflex, cortrak in place, stephen in place, not mobilizing, vent day 5      Plan: Sw to assist w/ d/c planning as needed.

## 2018-07-03 NOTE — PROCEDURES
Procedure Note    Date: 7/3/2018  Time: 1145    Procedure: Temporary hemodialysis line placement  Site: Left IJ vein    Indication: Need for emergent hemodialysis  Consent: Informed consent obtained from patient or designated decision maker after explaining the benefits/risks of the procedure including but not limited to bleeding, infection, nerve or other deep structure injury, pneumothorax/hemothorax, arrythmia, or death. Patient or surrogate expressed understanding and agreement and signed consent which can be found in the patient's chart.    Procedure: After obtaining consent, a time-out was performed. Appropriate site confirmed with ultrasound and patient positioned, prepped, and draped in sterile fashion. All those present wearing cap and mask and those physically participating remained adhering to sterile fashion with cap, mask, gloves, and gown.  5 mL of local anesthetic injected (1% lidocaine without epinephrine) achieving appropriate comfort level for patient. Vein localized and accessed using continuous ultrasound guidance and a 12 Fr dialysis catheter placed using Seldinger technique. Able to aspirate dark, non-pulsatile blood through each lumen and sterile saline flushed easily before capping. Line secured and dressed in sterile fashion. Patient tolerated procedure well without any difficulties and remains in care of bedside nurse. CXR will be performed to confirm appropriate placement for internal jugular or subclavian CVLs.    EBL: minimal  Complications: None  CXR: Pending    Jeremy Gonda, MD  Critical Care Medicine

## 2018-07-03 NOTE — PROGRESS NOTES
Nephrology Progress Note, Adult, Complex               Author: Leena Santosvitorcarlos Date & Time created: 7/3/2018  4:52 PM     Interval History:  68 y/o male with HTN, DM II, CKD -suffered from cardiac arrest -s/p hypothermia protocol  In [place]  Intub/vent  Worsening edema  Creat worse  Poor UOP  Started HD  Seen and examined during 1st dialysis treatment -tolerates well  Review of Systems:  Review of Systems   Reason unable to perform ROS: intubated.       Physical Exam:  Physical Exam   Constitutional: He appears well-developed and well-nourished. No distress.   HENT:   Head: Normocephalic and atraumatic.   Nose: Nose normal.   Mouth/Throat: No oropharyngeal exudate.   ETT   Eyes: Conjunctivae are normal. Pupils are equal, round, and reactive to light. No scleral icterus.   Neck: No thyromegaly present.   Cardiovascular: Normal rate and regular rhythm.  Exam reveals no gallop and no friction rub.    Pulmonary/Chest:   Coarse BS  vented   Nursing note and vitals reviewed.      Labs:  Recent Labs      07/01/18   0332  07/02/18   0432  07/03/18   0421   ISTATAPH  7.476  7.336*  7.372*   ISTATAPCO2  22.9*  33.0  27.8   ISTATAPO2  180*  80  74   ISTATATCO2  18*  19*  17*   [id number]  100*  95  95   ISTATARTHCO3  16.9*  17.6  16.1*   ISTATARTBE  -5*  -7*  -8*   ISTATTEMP  35.8 C  36.0 C  35.8 C   ISTATFIO2  70  40  35   ISTATSPEC  Arterial  Arterial  Arterial   ISTATAPHTC  7.494  7.350*  7.389*   [id number]  174*  75  68         Recent Labs      07/01/18   2345  07/02/18   0400  07/03/18   0555   SODIUM  136  137  136   POTASSIUM  4.5  4.5  4.9   CHLORIDE  109  109  105   CO2  16*  16*  17*   BUN  54*  50*  68*   CREATININE  4.01*  3.61*  5.66*   MAGNESIUM  3.7*  3.6*  3.4*   PHOSPHORUS  6.6*  7.0*  8.3*   CALCIUM  7.2*  7.6*  7.1*     Recent Labs      06/30/18   1819   07/01/18   2345  07/02/18   0400  07/03/18   0555   ALTSGPT  24   --    --   18   --    ASTSGOT  37   --    --   27   --    ALKPHOSPHAT  61   --    --   58   --     TBILIRUBIN  0.8   --    --   1.0   --    GLUCOSE  267*   < >  72  91  111*    < > = values in this interval not displayed.     Recent Labs      18   1045  18   1700  18   0013  18   0400  18   0555   RBC  4.56*  4.55*  4.43*  4.32*  3.97*   HEMOGLOBIN  11.4*  11.7*  11.2*  10.8*  10.3*   HEMATOCRIT  37.3*  37.8*  36.5*  35.9*  33.2*   PLATELETCT  177  162*  161*  153*  123*   PROTHROMBTM  43.9*  44.4*  42.0*  39.7*  44.4*   APTT  59.4*  74.2*  66.2*   --    --    INR  4.68*  4.75*  4.43*  4.12*  4.75*     Recent Labs      18   1819   18   0345   18   0013  07/02/18   0400  18   0555   WBC  14.4*   < >  12.8*   < >  16.1*  15.9*  13.3*   NEUTSPOLYS  83.40*   --   81.20*   --    --   82.90*  88.50*   LYMPHOCYTES  5.60*   --   6.40*   --    --   4.70*  3.10*   MONOCYTES  9.10   --   8.20   --    --   8.80  6.70   EOSINOPHILS  0.80   --   3.00   --    --   1.90  0.20   BASOPHILS  0.30   --   0.50   --    --   0.40  0.20   ASTSGOT  37   --    --    --    --   27   --    ALTSGPT  24   --    --    --    --   18   --    ALKPHOSPHAT  61   --    --    --    --   58   --    TBILIRUBIN  0.8   --    --    --    --   1.0   --     < > = values in this interval not displayed.           Hemodynamics:  Temp (24hrs), Av.7 °C (98 °F), Min:36.3 °C (97.3 °F), Max:37 °C (98.6 °F)  Temperature: 36.3 °C (97.3 °F), Monitored Temp: 36.4 °C (97.5 °F)  Pulse  Av.1  Min: 36  Max: 84Heart Rate (Monitored): 60  Blood Pressure : 100/51, Arterial BP: 104/56, NIBP: 104/55  CVP (mm Hg): (!) 12 MM HG  Respiratory:  Gorman Vent Mode: APVCMV, Rate (breaths/min): 18, PEEP/CPAP: 10, FiO2: 40, P Peak (PIP): 34, P MEAN: 16 Respiration: (!) 22, Pulse Oximetry: 96 %     Work Of Breathing / Effort: Vented  RUL Breath Sounds: Crackles, RML Breath Sounds: Diminished, RLL Breath Sounds: Diminished, KE Breath Sounds: Crackles, LLL Breath Sounds: Diminished  Fluids:    Intake/Output Summary (Last  24 hours) at 07/03/18 1652  Last data filed at 07/03/18 0600   Gross per 24 hour   Intake          1627.73 ml   Output              240 ml   Net          1387.73 ml     Weight: (!) 127.7 kg (281 lb 8 oz)  GI/Nutrition:  Orders Placed This Encounter   Procedures   • Diet NPO     Standing Status:   Standing     Number of Occurrences:   1     Order Specific Question:   Type:     Answer:   Now [1]     Order Specific Question:   Restrict to:     Answer:   Strict [1]     Medical Decision Making, by Problem:  Active Hospital Problems    Diagnosis   • *Cardiac arrest (HCC) [I46.9]   • Hypotension [I95.9]   • Acute hypoxemic respiratory failure (HCC) [J96.01]   • Cardiogenic shock (HCC) [R57.0]   • Anemia [D64.9]   • CKD (chronic kidney disease) stage 3, GFR 30-59 ml/min [N18.3]   • Anoxic encephalopathy (HCC) [G93.1]   • Aspiration pneumonia (HCC) [J69.0]   • Atrial fibrillation (HCC) [I48.91]   • Ischemic cardiomyopathy [I25.5]   • Hyperlipidemia [E78.5]   • Type 2 diabetes mellitus (HCC) [E11.9]   • Circulating anticoagulants (HCC) [D68.318]       Quality-Core Measures   Reviewed items::  Labs reviewed and Medications reviewed  Pedraza catheter::  Critically Ill - Requiring Accurate Measurement of Urinary Output  Central line in place:  Dialysis    Assessment and plan  1.ARABELLA/CKD/ATN/HD -seen and examined during first dialysis treatment -please see dialysis flow sheet for detaisl  2.Hypotension: to keep MAP> 65  3.Electrolytes: well controlled.  4.Anemia: drop in Hb level -to monitor  5.Volume:overloaded -UF with HD as tolerates    Recs: daily dialysis             Daily BMP             All meds to renal doses             Prognosis poor

## 2018-07-03 NOTE — CARE PLAN
Problem: Ventilation Defect:  Goal: Ability to achieve and maintain unassisted ventilation or tolerate decreased levels of ventilator support    Intervention: Support and monitor invasive and noninvasive mechanical ventilation  Adult Ventilation Update    Total Vent Days:5    Patient Lines/Drains/Airways Status    Active Airway     Name: Placement date: Placement time: Site: Days:    Airway Group ET Tube Oral 8.0 @ 25 06/29/18   2131   Oral   5              18 440 +10 40%    Events/Summary/Plan:Remains stable on current settings. Will continue to monitor.

## 2018-07-03 NOTE — PROGRESS NOTES
Pt's wife requested to change code status to DNR.                        Core Measures & Quality Metrics

## 2018-07-03 NOTE — PROGRESS NOTES
Pulmonary Critical Care Progress Note      Date of admission:  6/29/2018    Chief Complaint:  Respiratory failure    HPI: 67-year-old male with a known history   of diabetes, COPD, hypertension, 3-vessel coronary artery disease with a LIMA   to the LAD, SVGs to obtuse marginal and PDA in 04/2001, atrial fibrillation,   chronic kidney disease, ischemic cardiomyopathy with AICD pacer that presented   in cardiac arrest.  Patient's history was obtained from the family as well as   Dr. Mcdonnell from the emergency department.  The patient according to the son   [place] and was traveling to [place] to see his family.    He saw his son and then after dinner the patient wanted to go gambling and   once the son parked the car, he found that his dad had already passed out and   had a cardiopulmonary arrest.  He feels that they ate dinner at approximately   05:00-06:00 p.m. and then the visitation with the grandkids and then going to   the [employer] was between 08:00 and 08:30 p.m.  The patient did have a bystander   CPR, although AICD was then placed and it suggested being shocked.  He was   given at least 2 rounds of epinephrine and Narcan en route.  At least one AED   shock was given.  He did develop a pulse.  While in the emergency department,   he was intubated and a femoral line was placed.  I believe he may have had a   left IO line placed during the arrest.  The patient is on Coumadin for his   underlying AICD pacer AFib and according to the son, he has normally taking   his anticoagulant medications routinely.       Interval Events:  24 hour interval history reviewed   - CVP 10-13   - AF, decreasing WBC   - off levophed this morning, remains on vasopressin, amio gtt @ 1   - plts low at 123 down from 153   - worsening ARABELLA and hyperphosphatemia/Mag, oliguric despite lasix (I/O + 1.8L)   - INR worsening @ 4.75   - unasyn day 4/5   - off sedation x 24 hrs and no following, wiggles toes spontaneuously   - VD  # 5, SBT x 1 hr with forced parameters NIF -46, FVC 0.6, RSBI 37   - remains on D5 1/2 NS @ 40 --> start TFs    Review of Systems   Unable to perform ROS: Critical illness     Physical Exam   Constitutional: He is well-developed, well-nourished, and in no distress. He is intubated.   Remains critically ill appearing with ongoing full vent and chemical support   HENT:   Head: Normocephalic and atraumatic.   Nose: Nose normal.   ETT and coretrak in place   Eyes: Conjunctivae are normal. Pupils are equal, round, and reactive to light. Right eye exhibits no discharge. Left eye exhibits no discharge.   Neck: Neck supple. No JVD present. No tracheal deviation present.   Cardiovascular: Normal rate and intact distal pulses.   Occasional extrasystoles are present. PMI is displaced.  Exam reveals distant heart sounds. Exam reveals no gallop and no friction rub.    Murmur heard.  Paced rhythm, sternotomy scar noted, palpable AICD in left anterior chest   Pulmonary/Chest: Tachypnea noted. He is intubated. No respiratory distress. He has decreased breath sounds. He has no wheezes. He has rhonchi in the left middle field and the left lower field. He has rales (Scattered coarse crackles bilaterally) in the right lower field and the left lower field.   Abdominal: Soft. Bowel sounds are normal. He exhibits distension. There is no tenderness. There is no guarding.   Genitourinary: Penis normal.   Genitourinary Comments: Pedraza cath in place, rectal nasal trumpet   Musculoskeletal: He exhibits edema. He exhibits no tenderness or deformity.   No clubbing or cyanosis, (+) worsening hand and LE edema   Neurological:   Continues to open eyes to voice but not visually tracking or following commands, minimal withdrawal to pain although some spontaneous movement of head and lower extremities.   Skin: Skin is warm and dry. No rash noted. He is not diaphoretic.   Psychiatric:   Unable to assess given current clinical condition   Nursing note  and vitals reviewed.      PFSH:  No change.    Respiratory:  Gorman Vent Mode: Spont, PEEP/CPAP: 8, FiO2: 30, P Support: 5, Static Compliance (ml / cm H2O): 101  Pulse Oximetry: 90 % PIP 32, worsening compliance    CXR images personally reviewed and compared to prior images: Worsening pulmonary edema with enlarged cardiac and mediastinal silhouettes, sternotomy wire's and retrocardiac infiltrate noted, endotracheal tube/gastric tube/right IJ central venous catheter/AICD in good position without pneumothorax    Recent Labs      07/01/18   0332  07/02/18   0432  07/03/18   0421   ISTATAPH  7.476  7.336*  7.372*   ISTATAPCO2  22.9*  33.0  27.8   ISTATAPO2  180*  80  74   ISTATATCO2  18*  19*  17*   [id number]  100*  95  95   ISTATARTHCO3  16.9*  17.6  16.1*   ISTATARTBE  -5*  -7*  -8*   ISTATTEMP  35.8 C  36.0 C  35.8 C   ISTATFIO2  70  40  35   ISTATSPEC  Arterial  Arterial  Arterial   ISTATAPHTC  7.494  7.350*  7.389*   [id number]  174*  75  68   ABG interpretation: Persistent partially compensated metabolic acidosis with worsening oxygenation    HemoDynamics:  Pulse: 64, Heart Rate (Monitored): 77  Blood Pressure : 100/51, Arterial BP: 104/56, NIBP: 104/55  CVP (mm Hg): (!) 12 MM HG CVP 10-13  Norepinephrine off  Vasopressin 0.03  Amiodarone at 1    Imaging: Echo 7/1 - CONCLUSIONS  Compared to the report of the study done on 10/29/2017 there has been   no significant change.  Severely reduced left ventricular systolic function. Left ventricular   ejection fraction is visually estimated to be 15-20%.   No evidence of valvular abnormality based on Doppler evaluation.   Estimated right ventricular systolic pressure is 45 mmHg.          Neuro:  Imaging: CT head 6/29 - 1.  No acute intracranial abnormality is identified, there are changes compatible with small vessel ischemic disease and moderate atrophy.   2.  Chronic sinusitis changes of the right frontal, right maxillary, and left sphenoid sinus.    Off  sedation x 24 hrs      Fluids:  Intake/Output       07/01/18 0700 - 07/02/18 0659 07/02/18 0700 - 07/03/18 0659 07/03/18 0700 - 07/04/18 0659      0700-1859 1900-0659 Total 0700-1859 1900-0659 Total 0700-1859 1900-0659 Total       Intake    P.O.  --  0 0  --  -- --  --  -- --    P.O. -- 0 0 -- -- -- -- -- --    I.V.  1170  856.5 2026.5  1100.3  1052.5 2152.8  --  -- --    Amiodarone Volume 330 363 693 396 396 792 -- -- --    Vasopressin Volume 93.6 90 183.6 126 108 234 -- -- --    Phenylephrine Volume 188.6 37.5 226.1 0 -- 0 -- -- --    Propofol Volume 149.2 57.4 206.6 5.4 -- 5.4 -- -- --    Norepinephrine Volume 408.5 308.6 717.2 92.9 28.5 121.4 -- -- --    IV Volume (D51/2NS) -- -- --  -- -- --    Other  --  0 0  --  -- --  --  -- --    Medications (P.O./ Enteral Liquids) -- 0 0 -- -- -- -- -- --    Enteral  --  -- --  --  0 0  --  -- --    Intake (mL) (Enteral Tube Left Nare Cortrak Small Bowel Feeding Tube) -- -- -- -- 0 0 -- -- --    Total Intake 1170 856.5 2026.5 1100.3 1052.5 2152.8 -- -- --       Output    Urine  110  130 240  175  140 315  --  -- --    Output (mL) (Urinary Catheter Indwelling Catheter 16) 110 130 240 175 140 315 -- -- --    Stool  --  -- --  4  -- 4  --  -- --    Number of Times Stooled 1 x 0 x 1 x -- 1 x 1 x -- -- --    Measurable Stool (mL) -- -- -- 4 -- 4 -- -- --    Emesis/NG output  --  105 105  --  0 0  --  -- --    Output (mL) (Enteral Tube Left Nare Cortrak Small Bowel Feeding Tube) -- -- -- -- 0 0 -- -- --    Output (mL) ([REMOVED] Enteral Tube Oral) -- 105 105 -- -- -- -- -- --    Total Output 110 235 345 179 140 319 -- -- --       Net I/O     1060 621.5 1681.5 921.3 912.5 1833.8 -- -- --        Weight: (!) 127.7 kg (281 lb 8 oz)  Recent Labs      07/01/18   2345  07/02/18   0400  07/03/18   0555   SODIUM  136  137  136   POTASSIUM  4.5  4.5  4.9   CHLORIDE  109  109  105   CO2  16*  16*  17*   BUN  54*  50*  68*   CREATININE  4.01*  3.61*  5.66*   MAGNESIUM  3.7*   3.6*  3.4*   PHOSPHORUS  6.6*  7.0*  8.3*   CALCIUM  7.2*  7.6*  7.1*       GI/Nutrition:  Imaging: Reviewed    N.p.o. with coretrak placed but no TFs running yet  Liver Function  Recent Labs      18   2345  18   0400  18   0555   ALTSGPT  24   --    --   18   --    ASTSGOT  37   --    --   27   --    ALKPHOSPHAT  61   --    --   58   --    TBILIRUBIN  0.8   --    --   1.0   --    GLUCOSE  267*   < >  72  91  111*    < > = values in this interval not displayed.       Heme:  Recent Labs      18   1045  18   1700  18   0013  18   0400  18   0555   RBC  4.56*  4.55*  4.43*  4.32*  3.97*   HEMOGLOBIN  11.4*  11.7*  11.2*  10.8*  10.3*   HEMATOCRIT  37.3*  37.8*  36.5*  35.9*  33.2*   PLATELETCT  177  162*  161*  153*  123*   PROTHROMBTM  43.9*  44.4*  42.0*  39.7*  44.4*   APTT  59.4*  74.2*  66.2*   --    --    INR  4.68*  4.75*  4.43*  4.12*  4.75*       Infectious Disease:  Monitored Temp 2  Av.4 °C (97.5 °F)  Min: 35.7 °C (96.3 °F)  Max: 37.1 °C (98.8 °F)  Temp  Av.7 °C (98 °F)  Min: 36.3 °C (97.3 °F)  Max: 37 °C (98.6 °F)  Reviewed.  None  Recent Labs      18   0345   18   0013  18   0400  18   0555   WBC  14.4*   < >  12.8*   < >  16.1*  15.9*  13.3*   NEUTSPOLYS  83.40*   --   81.20*   --    --   82.90*  88.50*   LYMPHOCYTES  5.60*   --   6.40*   --    --   4.70*  3.10*   MONOCYTES  9.10   --   8.20   --    --   8.80  6.70   EOSINOPHILS  0.80   --   3.00   --    --   1.90  0.20   BASOPHILS  0.30   --   0.50   --    --   0.40  0.20   ASTSGOT  37   --    --    --    --   27   --    ALTSGPT  24   --    --    --    --   18   --    ALKPHOSPHAT  61   --    --    --    --   58   --    TBILIRUBIN  0.8   --    --    --    --   1.0   --     < > = values in this interval not displayed.     Current Facility-Administered Medications   Medication Dose Frequency Provider Last Rate Last Dose   • D5 1/2 NS  infusion   Continuous Dwight Ann D.O. 40 mL/hr at 07/03/18 0219     • Pharmacy Consult: Enteral tube feeding - review meds/change route/product selection  1 Each PRN Jeremy M Gonda, M.D.       • sodium chloride 3% nebulizer solution 3 mL  3 mL 4X/DAY (RT) Jeremy M Gonda, M.D.   3 mL at 07/03/18 0703   • furosemide (LASIX) injection 40 mg  40 mg Q12HRS Jeremy M Gonda, M.D.   40 mg at 07/02/18 2047   • norepinephrine (LEVOPHED) 4 mg in  mL Infusion  0-30 mcg/min Continuous Quinn Lauren M.D.   Stopped at 07/03/18 0445   • insulin regular (HUMULIN R) injection 2-9 Units  2-9 Units Q6HRS Art Reed M.D.   Stopped at 07/01/18 1800    And   • glucose 4 g chewable tablet 16 g  16 g Q15 MIN PRN Art Reed M.D.        And   • dextrose 50% (D50W) injection 25 mL  25 mL Q15 MIN PRN Art Reed M.D.   25 mL at 07/02/18 0005   • ampicillin/sulbactam (UNASYN) 3 g in  mL IVPB  3 g Q12HRS Jeremy M Gonda, M.D.   Stopped at 07/02/18 2117   • Respiratory Care per Protocol   Continuous RT Dwight Ann D.O.       • senna-docusate (PERICOLACE or SENOKOT S) 8.6-50 MG per tablet 2 Tab  2 Tab BID Dwight Ann D.O.   Stopped at 06/30/18 0900    And   • polyethylene glycol/lytes (MIRALAX) PACKET 1 Packet  1 Packet QDAY PRN Dwight Ann D.O.        And   • magnesium hydroxide (MILK OF MAGNESIA) suspension 30 mL  30 mL QDAY PRN Dwight Ann D.O.        And   • bisacodyl (DULCOLAX) suppository 10 mg  10 mg QDAY PRN Dwight Ann D.O.       • fentaNYL (SUBLIMAZE) injection 25 mcg  25 mcg Q HOUR PRN Dwight Ann D.O.        Or   • fentaNYL (SUBLIMAZE) injection 50 mcg  50 mcg Q HOUR PRN Dwight Ann D.O.        Or   • fentaNYL (SUBLIMAZE) injection 100 mcg  100 mcg Q HOUR PRN Dwight Ann D.O.       • ipratropium-albuterol (DUONEB) nebulizer solution  3 mL Q2HRS PRN (RT) Dwight Ann D.O.       • ipratropium-albuterol (DUONEB) nebulizer solution  3 mL  Q4HRS (RT) AKHIL ManzanoO.   3 mL at 07/03/18 0703   • famotidine (PEPCID) tablet 20 mg  20 mg DAILY Dwight Ann D.O.        Or   • famotidine (PEPCID) injection 20 mg  20 mg DAILY ARISTIDES Manzano.O.   20 mg at 07/03/18 0545   • aspirin (ASA) tablet 325 mg  325 mg DAILY ARISTIDES Manzano.O.   325 mg at 07/02/18 0940   • atorvastatin (LIPITOR) tablet 80 mg  80 mg DAILY ARISTIDES Manzano.O.   80 mg at 07/02/18 0940   • vasopressin (VASOSTRICT) 20 Units in  mL Infusion  0.03 Units/min Continuous Dwight Ann D.O.   Stopped at 07/03/18 0728   • amiodarone (CORDARONE) 450 mg in D5W 250 mL Infusion  0.5-1 mg/min Continuous Yury Tovar M.D. 33 mL/hr at 07/03/18 0148 1 mg/min at 07/03/18 0148     Last reviewed on 6/30/2018 10:29 AM by Oscar Broussard RCOLLETTE    Quality  Measures:  Labs reviewed, Medications reviewed and Radiology images reviewed  Pedraza catheter: Critically Ill - Requiring Accurate Measurement of Urinary Output and Unconscious / Sedated Patient on a Ventilator  Central line in place: Need for access, Vasopressors, Shock and Dialysis    DVT Prophylaxis: Contraindicated - High bleeding risk  DVT prophylaxis - mechanical: SCDs  Ulcer prophylaxis: Yes  Antibiotics: Treating active infection/contamination beyond 24 hours perioperative coverage  Assessed for rehab: Patient unable to tolerate rehabilitation therapeutic regimen      Assessment and Plan:  Acute hypoxemic respiratory failure - Intubated 6/29   - Continue full vent support with same settings, wean FiO2   - RT/O2 protocols   - daily SBTs although not extubation candidate given mentation   - limit all sedatives (d/c prn fentanyl)   - cont diuresis for developing cardiogenic pulmonary edema  Out of hospital cardiac arrest s/p ROSC   - s/p TTM   - cont amio gtt today   - supportive care with close neurologically monitoring, concern for probable anoxic brain injury  Coronary artery disease with prior CABG   -Continue aspirin and  statin  Acute on chronic systolic heart failure - EF 15-20%   - S/P prior AICD placement   - eventual BB and/or ACE-I when shock resolves  Cardiogenic shock - persistent   -Titrating vasopressor support with vasopressin to goal MAP >60 (decreased goal given low EF)   - hypervolemic  Anoxic encephalopathy - ?severity at this point although appears to be moderate   -limit sedatives   -Serial neurologic exams   - EEG today, no MRI given AICD  Acute on chronic kidney disease - worsening, oliguric   -Renal dose medications   -Avoid nephrotoxins   -Follow renal function   - temp HD cath placed today and will start on iHD per nephrology  Possible aspiration pneumonia   -Continue Unasyn x 5 days  Diabetes mellitus type 2   - cont sliding scale insulin  History of hypertension   -Now in cardiogenic shock with high-dose vasopressor support   - eventually resume anti-HTN if needed  Dyslipidemia   -Continue statin  COPD without acute exacerbation   - cont BDs, no steroids  History of atrial fibrillation - rate controlled currently   -Chronically anticoagulant with warfarin, on hold for now given supratherapeutic INR  Hyperphosphatemia - monitor with HD  Thrombocytopenia - monitor for bleeding  Hypoglycemia - wean off D5 1/2 NS MIVF after TFs started and monitor glucose closely  Severe protein calorie malnutrition - optimize nutritional status, nutritionist following  Prophylaxis, enteral nutrition, DNR    Patient remains critically ill today requiring my active management of his mechanical ventilatory support, fluid status, shock management.  Ongoing goals of care discussions were had with patient's wife as well as his 2 adult sons.  They wish to give him another 24-48 hours with hope for some sort of neurologic recovery and are willing to place him on dialysis in the meantime given worsening ARABELLA to see if that will temporize or possibly help his neurologic status as well.  Discussed benefits and risks of adding additional  therapies and they agree.  Prognosis remains poor at this time.    High risk of deterioration and worsening vital organ dysfunction and death without the above critical care interventions.    Critical Care Time:  39 minutes  [id number]  No time overlap  Time excludes procedures  Discussed with RN, RT, Team, Clinical pharmacist, Hospitalist, wife/sons, nephrology, cardiology

## 2018-07-03 NOTE — ASSESSMENT & PLAN NOTE
Cr had gone up to 5.66 with poor urine output.  A temporary dialysis catheter was placed and dialysis initiated on 7/3.  He has been making urine.

## 2018-07-03 NOTE — WOUND TEAM
Wound team note:   Pt seen for placement of BMS in T633. MD order verified. Pt assessed, noted to have loose brown stool in rectal trumpet. Sacrum/buttocks red with moisture fissures. Sphincter tone determined to be adequate. BMS placed without difficulty, 40 mL of tap water placed in retention balloon, irrigated with 110 mL warm tap water. Nursing to irrigate q shift with 60 mL-120 mL warm tap water or until patent.

## 2018-07-03 NOTE — PALLIATIVE CARE
Palliative Care follow-up  Received message form pts son that they are concerned if [place] had been notified of the pts admittance. Spoke with Bed Day Utilization Review who confirmed that notification had been sent.     Updated: Pts son and spouse.         Thank you for allowing Palliative Care to participate in this patient's care. Please feel free to call [phone] with any questions or concerns.

## 2018-07-03 NOTE — DISCHARGE PLANNING
Medical SW    Referral: Sw spoke to palliative RN.    Pt medical condition is guarded and not improving as of this time. RN has been providing support to pt's adult sons and his wife. Wife is not ready for hospice or comfort care at this time.    Plan: Sw to assist w/ d/c planning as needed.

## 2018-07-03 NOTE — PROGRESS NOTES
[facility] Hospitalist Progress Note    Date of Service: 7/3/2018    Chief Complaint  67 y.o. male admitted 2018 with cardiac arrest.    Interval Problem Update  Mr. Pérez has a history of DM and HTN that had a cardiac arrest and underwent CPR and was shocked. He was admitted to the ICU on the hypothermia protocol.    RN notes that he moves his feet to stimulation though not to command.  He remains on vasopressin drip due to hypotension as well as an amiodarone drip.     Mr. Pérez's son is at bedside and we discussed his very guarded prognosis including the very significant probability of severe, anoxic brain injury. His family is amenable to a family conference tomorrow. Due to an increase in Cr and anuric state, dialysis has been initiated today.  Consultants/Specialty  Cardiology  Critical care. I discussed with Dr. Gonda on ICU Hot Rounds.   Nephrology. I discussed with Dr. Lyons this morning.   Disposition  ICU        Review of Systems   Unable to perform ROS: Intubated      Physical Exam  Laboratory/Imaging   Hemodynamics  Temp (24hrs), Av.7 °C (98 °F), Min:36.3 °C (97.3 °F), Max:37 °C (98.6 °F)   Temperature: 36.3 °C (97.3 °F), Monitored Temp: 36.4 °C (97.5 °F)  Pulse  Av.1  Min: 36  Max: 84 Heart Rate (Monitored): 61  Blood Pressure : 100/51, Arterial BP: 104/56, NIBP: 104/55 CVP (mm Hg): (!) 12 MM HG    Respiratory  Gorman Vent Mode: Spont, Rate (breaths/min): 18, PEEP/CPAP: 8, PEEP/CPAP: 8, FiO2: 30, P Peak (PIP): 17, P MEAN: 12   Respiration: (!) 22, Pulse Oximetry: 92 %     Work Of Breathing / Effort: Vented  RUL Breath Sounds: Clear, RML Breath Sounds: Diminished, RLL Breath Sounds: Diminished, KE Breath Sounds: Clear, LLL Breath Sounds: Diminished    Fluids    Intake/Output Summary (Last 24 hours) at 18 0727  Last data filed at 18 0600   Gross per 24 hour   Intake          2152.81 ml   Output              319 ml   Net          1833.81 ml       Nutrition  Orders Placed This  Encounter   Procedures   • Diet NPO     Standing Status:   Standing     Number of Occurrences:   1     Order Specific Question:   Type:     Answer:   Now [1]     Order Specific Question:   Restrict to:     Answer:   Strict [1]     Physical Exam   Constitutional: No distress.   Neck:   Left IJ dialysis catheter with bleeding around it.   Cardiovascular:   Distant heart sound   Pulmonary/Chest:   Vent, good air movement  No rhonchi   Abdominal: Soft. He exhibits no distension.   Genitourinary:   Genitourinary Comments: Pedraza catheter   Musculoskeletal:   Right femoral arterial line   Neurological:   He does not follow commands  No withdrawal   Skin: Skin is warm and dry. He is not diaphoretic.   Nursing note and vitals reviewed.      Recent Labs      07/02/18   0013  07/02/18   0400  07/03/18   0555   WBC  16.1*  15.9*  13.3*   RBC  4.43*  4.32*  3.97*   HEMOGLOBIN  11.2*  10.8*  10.3*   HEMATOCRIT  36.5*  35.9*  33.2*   MCV  82.4  83.1  83.6   MCH  25.3*  25.0*  25.9*   MCHC  30.7*  30.1*  31.0*   RDW  51.5*  53.3*  53.0*   PLATELETCT  161*  153*  123*   MPV  10.9  10.3  10.9     Recent Labs      07/01/18   2345  07/02/18   0400  07/03/18   0555   SODIUM  136  137  136   POTASSIUM  4.5  4.5  4.9   CHLORIDE  109  109  105   CO2  16*  16*  17*   GLUCOSE  72  91  111*   BUN  54*  50*  68*   CREATININE  4.01*  3.61*  5.66*   CALCIUM  7.2*  7.6*  7.1*     Recent Labs      07/01/18   1045  07/01/18   1700  07/02/18   0013  07/02/18   0400  07/03/18   0555   APTT  59.4*  74.2*  66.2*   --    --    INR  4.68*  4.75*  4.43*  4.12*  4.75*         Recent Labs      07/02/18   0400   TRIGLYCERIDE  161*          Assessment/Plan     * Cardiac arrest (HCC)   Assessment & Plan    s/p out of hospital arrest with ROSC  On amiodarone drip   Cardiology consulted.  His condition is concerning for anoxic brain injury. Family has elected to wait  until 72 hours and reevaluate depending on his clinical and neurologic exam at that time         Cardiogenic shock (HCC)- (present on admission)   Assessment & Plan    Requiring IV pressors.         Acute hypoxemic respiratory failure (HCC)   Assessment & Plan    Requiring intubation  Critical care consulted        Acute renal failure (ARF) (HCC)- (present on admission)   Assessment & Plan    Cr has gone up to 5.66 with poor urine output.  A temporary dialysis catheter will be placed and dialysis initiated.        Anoxic encephalopathy (HCC)- (present on admission)   Assessment & Plan    severe        Aspiration pneumonia (HCC)   Assessment & Plan    IV Unasyn complete 5 day course        Ischemic cardiomyopathy   Assessment & Plan    EF 20%  IV diuresis        Atrial fibrillation (HCC)   Assessment & Plan    Paroxysmal  Remains on amiodarone drip for rate-control        Type 2 diabetes mellitus (HCC)- (present on admission)   Assessment & Plan    Uncontrolled with hyperglycemia     Monitor CBGs blood sugars labile continue sliding-scale insulin for now        Hyperlipidemia- (present on admission)   Assessment & Plan    Continue atorvastatin        Circulating anticoagulants (HCC)- (present on admission)   Assessment & Plan    On outpatient warfarin.   INR remains high likely from poor liver synthetic function        Anemia- (present on admission)   Assessment & Plan    No signs of bleeding hemoglobin stable        CKD (chronic kidney disease) stage 3, GFR 30-59 ml/min- (present on admission)   Assessment & Plan    History of          Quality-Core Measures   Reviewed items::  Radiology images reviewed, Medications reviewed and Labs reviewed  Pedraza catheter::  Unconscious / Sedated Patient on a Ventilator  DVT prophylaxis pharmacological::  Contraindicated - High bleeding risk      Prognosis is poor.

## 2018-07-04 NOTE — PROGRESS NOTES
Nephrology Progress Note, Adult, Complex               Author: Leena Santosvitorcarlos Date & Time created: 7/4/2018  2:32 PM     Interval History:  68 y/o male with HTN, DM II, CKD -suffered from cardiac arrest -s/p hypothermia protocol  In [place]  Intub/vent  Worsening edema  Creat worse  Poor UOP  Started HD  Seen and examined during 2nd dialysis treatment -tolerates well  Review of Systems:  Review of Systems   Reason unable to perform ROS: intubated.       Physical Exam:  Physical Exam   Constitutional: He appears well-developed and well-nourished. No distress.   HENT:   Head: Normocephalic and atraumatic.   Nose: Nose normal.   Mouth/Throat: No oropharyngeal exudate.   ETT   Eyes: Conjunctivae are normal. Pupils are equal, round, and reactive to light. No scleral icterus.   Neck: No thyromegaly present.   Cardiovascular: Normal rate and regular rhythm.  Exam reveals no gallop and no friction rub.    Pulmonary/Chest:   Coarse BS  vented   Nursing note and vitals reviewed.      Labs:  Recent Labs      07/02/18   0432  07/03/18   0421  07/04/18   0352   ISTATAPH  7.336*  7.372*  7.409   ISTATAPCO2  33.0  27.8  32.5   ISTATAPO2  80  74  74   ISTATATCO2  19*  17*  22   [id number]  95  95  95   ISTATARTHCO3  17.6  16.1*  20.5   ISTATARTBE  -7*  -8*  -4   ISTATTEMP  36.0 C  35.8 C  36.1 C   ISTATFIO2  40  35  40   ISTATSPEC  Arterial  Arterial  Arterial   ISTATAPHTC  7.350*  7.389*  7.423   [id number]  75  68  69         Recent Labs      07/02/18   0400  07/03/18   0555  07/04/18   0535   SODIUM  137  136  134*   POTASSIUM  4.5  4.9  4.6   CHLORIDE  109  105  101   CO2  16*  17*  18*   BUN  50*  68*  54*   CREATININE  3.61*  5.66*  4.00*   MAGNESIUM  3.6*  3.4*  2.8*   PHOSPHORUS  7.0*  8.3*  7.1*   CALCIUM  7.6*  7.1*  7.3*     Recent Labs      07/02/18   0400  07/03/18   0555  07/04/18   0535   ALTSGPT  18   --    --    ASTSGOT  27   --    --    ALKPHOSPHAT  58   --    --    TBILIRUBIN  1.0   --    --    GLUCOSE  91  111*  214*      Recent Labs      18   1700  18   0013  18   0400  18   0555  18   1715  18   0535   RBC  4.55*  4.43*  4.32*  3.97*  3.75*  3.52*   HEMOGLOBIN  11.7*  11.2*  10.8*  10.3*  9.5*  9.0*   HEMATOCRIT  37.8*  36.5*  35.9*  33.2*  31.1*  29.1*   PLATELETCT  162*  161*  153*  123*  127*  120*   PROTHROMBTM  44.4*  42.0*  39.7*  44.4*   --   40.3*   APTT  74.2*  66.2*   --    --    --    --    INR  4.75*  4.43*  4.12*  4.75*   --   4.20*     Recent Labs      18   0400  18   0555  18   0535   WBC  15.9*  13.3*  14.3*  13.2*   NEUTSPOLYS  82.90*  88.50*   --   86.60*   LYMPHOCYTES  4.70*  3.10*   --   2.60*   MONOCYTES  8.80  6.70   --   8.70   EOSINOPHILS  1.90  0.20   --   0.20   BASOPHILS  0.40  0.20   --   0.20   ASTSGOT  27   --    --    --    ALTSGPT  18   --    --    --    ALKPHOSPHAT  58   --    --    --    TBILIRUBIN  1.0   --    --    --            Hemodynamics:  Temp (24hrs), Av.7 °C (98.1 °F), Min:36 °C (96.8 °F), Max:37.3 °C (99.1 °F)  Temperature: 36.5 °C (97.7 °F), Monitored Temp: 36.7 °C (98.1 °F)  Pulse  Av.1  Min: 36  Max: 84Heart Rate (Monitored): 66  Arterial BP: 108/40, NIBP: 116/62  CVP (mm Hg): (!) 12 MM HG  Respiratory:  Gorman Vent Mode: APVCMV, P Peak (PIP): 15, P MEAN: 8 Respiration: (!) 21, Pulse Oximetry: 100 %     Work Of Breathing / Effort: Vented  RUL Breath Sounds: Crackles, RML Breath Sounds: Diminished, RLL Breath Sounds: Diminished, KE Breath Sounds: Crackles, LLL Breath Sounds: Diminished  Fluids:    Intake/Output Summary (Last 24 hours) at 18 1432  Last data filed at 18 0600   Gross per 24 hour   Intake           1568.4 ml   Output             1885 ml   Net           -316.6 ml       GI/Nutrition:  Orders Placed This Encounter   Procedures   • Diet NPO     Standing Status:   Standing     Number of Occurrences:   1     Order Specific Question:   Type:     Answer:   Now [1]     Order Specific  Question:   Restrict to:     Answer:   Strict [1]     Medical Decision Making, by Problem:  Active Hospital Problems    Diagnosis   • *Cardiac arrest (HCC) [I46.9]   • Hypotension [I95.9]   • Acute hypoxemic respiratory failure (HCC) [J96.01]   • Cardiogenic shock (HCC) [R57.0]   • Anemia [D64.9]   • CKD (chronic kidney disease) stage 3, GFR 30-59 ml/min [N18.3]   • Anoxic encephalopathy (HCC) [G93.1]   • Aspiration pneumonia (HCC) [J69.0]   • Atrial fibrillation (HCC) [I48.91]   • Ischemic cardiomyopathy [I25.5]   • Hyperlipidemia [E78.5]   • Type 2 diabetes mellitus (HCC) [E11.9]   • Circulating anticoagulants (HCC) [D68.318]       Quality-Core Measures   Reviewed items::  Labs reviewed and Medications reviewed  Pedraza catheter::  Critically Ill - Requiring Accurate Measurement of Urinary Output  Central line in place:  Dialysis    Assessment and plan  1.ARABELLA/CKD/ATN/HD -seen and examined during first dialysis treatment -please see dialysis flow sheet for details  2.Hypotension: on pressor -to keep MAP> 65  3.Electrolytes: elevated Phos level -to monitor  4.Anemia: Hb stable  5.Volume:overloaded -UF with HD as tolerates    Recs: daily dialysis             Daily BMP             All meds to renal doses             Prognosis poor

## 2018-07-04 NOTE — CARE PLAN
Problem: Ventilation Defect:  Goal: Ability to achieve and maintain unassisted ventilation or tolerate decreased levels of ventilator support  Outcome: PROGRESSING AS EXPECTED    Intervention: Support and monitor invasive and noninvasive mechanical ventilation  Adult Ventilation Update    Total Vent Days: 6    Patient Lines/Drains/Airways Status    Active Airway     Name: Placement date: Placement time: Site: Days:    Airway Group ET Tube Oral 8.0 06/29/18 2131   Oral   6              Vent settings: 18 440 +10 40%  Duoneb Q4, 3% QID    Sputum/Suction   Cough: Productive (07/04/18 0155)  Sputum Amount: Small (07/04/18 0155)  Sputum Color: Bloody;Yellow (07/04/18 0155)  Sputum Consistency: Thick (07/04/18 0155)        Events/Summary/Plan: Abg drawn, no changes made. Pt stable on vent, will continue to monitor.

## 2018-07-04 NOTE — CARE PLAN
Problem: Venous Thromboembolism (VTW)/Deep Vein Thrombosis (DVT) Prevention:  Goal: Patient will participate in Venous Thrombosis (VTE)/Deep Vein Thrombosis (DVT)Prevention Measures  Outcome: PROGRESSING AS EXPECTED   07/04/18 0623   Mechanical/VTE Prophylaxis   SCDs, Sequential Compression Device On         Problem: Urinary Elimination:  Goal: Ability to reestablish a normal urinary elimination pattern will improve  Outcome: PROGRESSING SLOWER THAN EXPECTED   07/04/18 0623   OTHER   Urinary Elimination Anuric/ Dialysis Patient;Oliguria     125 mL output this shift      Problem: Skin Integrity  Goal: Risk for impaired skin integrity will decrease  Outcome: PROGRESSING AS EXPECTED  Pt unable to turn self, and needs reminding and support with pillows. Pt turned/repositioned every 2 hours. No new skin break down noted over bony prominences. Artic Sun pads removed since to help prevent skin breakdown.     Problem: Nutrition Deficit  Goal: Enteral Nutrition Management  Outcome: PROGRESSING AS EXPECTED  Novascource Renal started and at goal (30 mL/hr).

## 2018-07-04 NOTE — PROGRESS NOTES
Pulmonary Critical Care Progress Note      Date of admission:  6/29/2018    Chief Complaint:  Respiratory failure    HPI: 67-year-old male with a known history   of diabetes, COPD, hypertension, 3-vessel coronary artery disease with a LIMA   to the LAD, SVGs to obtuse marginal and PDA in 04/2001, atrial fibrillation,   chronic kidney disease, ischemic cardiomyopathy with AICD pacer that presented   in cardiac arrest.  Patient's history was obtained from the family as well as   Dr. Mcdonnell from the emergency department.  The patient according to the son   normally [place] the [place] and was traveling to [place] to see his family.    He saw his son and then after dinner the patient wanted to go gambling and   once the son parked the car, he found that his dad had already passed out and   had a cardiopulmonary arrest.  He feels that they ate dinner at approximately   05:00-06:00 p.m. and then the visitation with the grandkids and then going to   the [employer] was between 08:00 and 08:30 p.m.  The patient did have a bystander   CPR, although AICD was then placed and it suggested being shocked.  He was   given at least 2 rounds of epinephrine and Narcan en route.  At least one AED   shock was given.  He did develop a pulse.  While in the emergency department,   he was intubated and a femoral line was placed.  I believe he may have had a   left IO line placed during the arrest.  The patient is on Coumadin for his   underlying AICD pacer AFib and according to the son, he has normally taking   his anticoagulant medications routinely.       Interval Events:  24 hour interval history reviewed   - EEG showing diffuse encephalopathy without seizures   - remains on levophed and vaso gtts   - underwent HD yesterday with 1L removed, made 280 mL UOP   - no further arrhythmias, defib function turned off   - worsening edema on exam and CXR   - WBC down slightly, AF   - myriam TFs   - plts remain low at 120   - Na down to 134, phos 7.1   -  off D5 gtt and no further hypoglycemia   - mucolytics   - INR remains elevated at 4.2   - unasyn day 5/5 today   - SSI requiring 5 units in last 24 hrs     Review of Systems   Unable to perform ROS: Critical illness     Physical Exam   Constitutional: No distress. He is intubated.   Continues to appear critically ill without significant signs of improvement, dependent on life support machines   HENT:   Head: Normocephalic and atraumatic.   Right Ear: External ear normal.   Left Ear: External ear normal.   Nose: Nose normal.   ETT and coretrak in place   Eyes: Conjunctivae are normal. Pupils are equal, round, and reactive to light. No scleral icterus.   Neck: Neck supple. No tracheal deviation present.   Cardiovascular: Normal rate, regular rhythm and intact distal pulses.   No extrasystoles are present. PMI is displaced.  Exam reveals distant heart sounds. Exam reveals no gallop and no friction rub.    Murmur heard.  Paced rhythm, sternotomy scar noted, palpable AICD in left anterior chest   Pulmonary/Chest: No tachypnea. He is intubated. No respiratory distress. He has no decreased breath sounds. He has no wheezes. He has rhonchi in the left middle field and the left lower field. He has rales (Scattered coarse crackles bilaterally) in the right middle field, the right lower field, the left middle field and the left lower field.   Abdominal: Soft. Bowel sounds are normal. He exhibits distension. There is no tenderness. There is no rebound and no guarding.   Genitourinary: Penis normal.   Genitourinary Comments: Pedraza cath in place, BMS   Musculoskeletal: He exhibits edema. He exhibits no tenderness or deformity.   No clubbing or cyanosis, (+) ongoing worsening of hand and LE edema   Lymphadenopathy:     He has no cervical adenopathy.   Neurological:   Spontaneous movement of bilateral lower extremities left greater than right, opens eyes spontaneously but not following commands, positive corneal/gag/cough, no  withdrawal to pain in upper extremities   Skin: Skin is warm and dry. No rash noted. No pallor.   Psychiatric:   Unable to assess given current clinical condition   Nursing note and vitals reviewed.      PFSH:  No change.    Respiratory:  Gorman Vent Mode: APVCMV, Rate (breaths/min): 18, Vt Target (mL): 440, PEEP/CPAP: 10, FiO2: 40, Static Compliance (ml / cm H2O): 70.7, Control VTE (exp VT): 484  Pulse Oximetry: 100 % PIP 30,  unchanged poor compliance    CXR images personally reviewed and compared to prior images: Worsening diffuse bilateral infiltrates with small left effusion consistent with pulmonary edema, endotracheal tube/gastric tube/right IJ central venous catheter/left hemodialysis catheter/AICD in good position without pneumothorax    Recent Labs      07/02/18   0432  07/03/18   0421  07/04/18   0352   ISTATAPH  7.336*  7.372*  7.409   ISTATAPCO2  33.0  27.8  32.5   ISTATAPO2  80  74  74   ISTATATCO2  19*  17*  22   [id number]  95  95  95   ISTATARTHCO3  17.6  16.1*  20.5   ISTATARTBE  -7*  -8*  -4   ISTATTEMP  36.0 C  35.8 C  36.1 C   ISTATFIO2  40  35  40   ISTATSPEC  Arterial  Arterial  Arterial   ISTATAPHTC  7.350*  7.389*  7.423   [id number]  75  68  69   ABG interpretation: Improving compensated metabolic acidosis with borderline oxygenation    HemoDynamics:  Pulse: 60, Heart Rate (Monitored): 75  Arterial BP: 108/40, NIBP: 116/62  CVP (mm Hg): (!) 12 MM HG   Norepinephrine at 5  Vasopressin at 0.03  Amiodarone at 1    Imaging: Echo 7/1 - CONCLUSIONS  Compared to the report of the study done on 10/29/2017 there has been   no significant change.  Severely reduced left ventricular systolic function. Left ventricular   ejection fraction is visually estimated to be 15-20%.   No evidence of valvular abnormality based on Doppler evaluation.   Estimated right ventricular systolic pressure is 45 mmHg.        Neuro:  Imaging: CT head 6/29 -    1.  No acute intracranial abnormality is identified, there  are changes compatible with small vessel ischemic disease and moderate atrophy.   2.  Chronic sinusitis changes of the right frontal, right maxillary, and left sphenoid sinus.      EEG 7/3: IMPRESSION:  This is a moderate to severely abnormal EEG for a patient this   age consistent with diffuse cerebral dysfunction.  No seizure activity is   seen.  The findings suggest diffuse cortical abnormality.  Clinical   correlation is suggested.    Fluids:  Intake/Output       07/02/18 0700 - 07/03/18 0659 07/03/18 0700 - 07/04/18 0659 07/04/18 0700 - 07/05/18 0659      0700-1859 7023-8106 Total 0700-1859 1900-0659 Total 5493-6807 0502-9502 Total       Intake    I.V.  1100.3  1052.5 2152.8  834.3  458.8 1293.1  --  -- --    Amiodarone Volume 396 396 792 396 297 693 -- -- --    Vasopressin Volume 126 108 234 104.7 108 212.7 -- -- --    Phenylephrine Volume 0 -- 0 -- -- -- -- -- --    Propofol Volume 5.4 -- 5.4 -- -- -- -- -- --    Norepinephrine Volume 92.9 28.5 121.4 13.6 53.8 67.4 -- -- --    IV Volume (D51/2NS)  320 -- 320 -- -- --    Other  --  -- --  --  30 30  --  -- --    Medications (P.O./ Enteral Liquids) -- -- -- -- 30 30 -- -- --    Dialysis  --  -- --  600  -- 600  --  -- --    Dialysis Volume (Dialysis Intake) -- -- -- 600 -- 600 -- -- --    Enteral  --  0 0  200  180 380  --  -- --    Free Water / Tube Flush -- -- -- -- 180 180 -- -- --    Intake (mL) (Enteral Tube Left Nare Cortrak Small Bowel Feeding Tube) -- 0 0 200 -- 200 -- -- --    Total Intake 1100.3 1052.5 2152.8 1634.3 668.8 2303.1 -- -- --       Output    Urine  175  140 315  160  125 285  --  -- --    Output (mL) (Urinary Catheter Indwelling Catheter 16) 175 140 315 160 125 285 -- -- --    Drains  --  -- --  --  0 0  --  -- --    Residual Amount (ml) (Discarded) -- -- -- -- 0 0 -- -- --    Dialysis  --  -- --  1600  -- 1600  --  -- --    Dialysis Output (Dialysis Output) -- -- -- 1600 -- 1600 -- -- --    Stool  4  -- 4  --  -- --  --  -- --     Number of Times Stooled -- 1 x 1 x -- -- -- -- -- --    Measurable Stool (mL) 4 -- 4 -- -- -- -- -- --    Emesis/NG output  --  0 0  --  -- --  --  -- --    Output (mL) (Enteral Tube Left Nare Cortrak Small Bowel Feeding Tube) -- 0 0 -- -- -- -- -- --    Total Output 179  125 1885 -- -- --       Net I/O     921.3 912.5 1833.8 -125.7 543.8 418.1 -- -- --          Recent Labs      18   0400  18   0555  18   0535   SODIUM  137  136  134*   POTASSIUM  4.5  4.9  4.6   CHLORIDE  109  105  101   CO2  16*  17*  18*   BUN  50*  68*  54*   CREATININE  3.61*  5.66*  4.00*   MAGNESIUM  3.6*  3.4*  2.8*   PHOSPHORUS  7.0*  8.3*  7.1*   CALCIUM  7.6*  7.1*  7.3*       GI/Nutrition:  Imaging: Reviewed    N.p.o. with coretrak and TFs being tolerated  Liver Function  Recent Labs      18   0400  18   0555  18   0535   ALTSGPT  18   --    --    ASTSGOT  27   --    --    ALKPHOSPHAT  58   --    --    TBILIRUBIN  1.0   --    --    GLUCOSE  91  111*  214*       Heme:  Recent Labs      18   1045  18   1700  18   0013  18   0400  18   0555  18   1715  18   0535   RBC  4.56*  4.55*  4.43*  4.32*  3.97*  3.75*  3.52*   HEMOGLOBIN  11.4*  11.7*  11.2*  10.8*  10.3*  9.5*  9.0*   HEMATOCRIT  37.3*  37.8*  36.5*  35.9*  33.2*  31.1*  29.1*   PLATELETCT  177  162*  161*  153*  123*  127*  120*   PROTHROMBTM  43.9*  44.4*  42.0*  39.7*  44.4*   --   40.3*   APTT  59.4*  74.2*  66.2*   --    --    --    --    INR  4.68*  4.75*  4.43*  4.12*  4.75*   --   4.20*       Infectious Disease:  Monitored Temp 2  Av.9 °C (98.5 °F)  Min: 36.7 °C (98.1 °F)  Max: 37 °C (98.6 °F)  Temp  Av.7 °C (98.1 °F)  Min: 36 °C (96.8 °F)  Max: 37.3 °C (99.1 °F)  Reviewed.  None  Recent Labs      18   0400  18   0555  18   1715  18   0535   WBC  15.9*  13.3*  14.3*  13.2*   NEUTSPOLYS  82.90*  88.50*   --   86.60*   LYMPHOCYTES  4.70*  3.10*   --    2.60*   MONOCYTES  8.80  6.70   --   8.70   EOSINOPHILS  1.90  0.20   --   0.20   BASOPHILS  0.40  0.20   --   0.20   ASTSGOT  27   --    --    --    ALTSGPT  18   --    --    --    ALKPHOSPHAT  58   --    --    --    TBILIRUBIN  1.0   --    --    --      Current Facility-Administered Medications   Medication Dose Frequency Provider Last Rate Last Dose   • albumin human 25% solution 12.5 g  12.5 g DIALYSIS PRN Leena Lyons M.D. 150 mL/hr at 07/03/18 1724 12.5 g at 07/03/18 1724   • heparin injection 3,300 Units  3,300 Units DIALYSIS PRN Leena Lyons M.D.   3,300 Units at 07/03/18 1831   • D5 1/2 NS infusion   Continuous Dwight Ann D.O.   Stopped at 07/03/18 1600   • Pharmacy Consult: Enteral tube feeding - review meds/change route/product selection  1 Each PRN Jeremy M Gonda, M.D.       • sodium chloride 3% nebulizer solution 3 mL  3 mL 4X/DAY (RT) Jeremy M Gonda, M.D.   3 mL at 07/04/18 0634   • furosemide (LASIX) injection 40 mg  40 mg Q12HRS Jeremy M Gonda, M.D.   40 mg at 07/03/18 2124   • norepinephrine (LEVOPHED) 4 mg in  mL Infusion  0-30 mcg/min Continuous Quinn Lauren M.D. 15 mL/hr at 07/04/18 0651 4 mcg/min at 07/04/18 0651   • insulin regular (HUMULIN R) injection 2-9 Units  2-9 Units Q6HRS Art Reed M.D.   3 Units at 07/04/18 0547    And   • glucose 4 g chewable tablet 16 g  16 g Q15 MIN PRN Art Reed M.D.        And   • dextrose 50% (D50W) injection 25 mL  25 mL Q15 MIN PRN Art Reed M.D.   25 mL at 07/02/18 0005   • ampicillin/sulbactam (UNASYN) 3 g in  mL IVPB  3 g Q12HRS Jeremy M Gonda, M.D.   Stopped at 07/03/18 2200   • Respiratory Care per Protocol   Continuous RT Dwight Ann D.O.       • senna-docusate (PERICOLACE or SENOKOT S) 8.6-50 MG per tablet 2 Tab  2 Tab BID Dwight Ann D.O.   Stopped at 07/03/18 2124    And   • polyethylene glycol/lytes (MIRALAX) PACKET 1 Packet  1 Packet QDAY PRN Dwight Ann D.O.        And    • magnesium hydroxide (MILK OF MAGNESIA) suspension 30 mL  30 mL QDAY PRN AKHIL ManzanoO.        And   • bisacodyl (DULCOLAX) suppository 10 mg  10 mg QDAY PRN ARISTIDES Manzano.O.       • ipratropium-albuterol (DUONEB) nebulizer solution  3 mL Q2HRS PRN (RT) AKHIL ManzanoO.       • ipratropium-albuterol (DUONEB) nebulizer solution  3 mL Q4HRS (RT) ARISTIDES Manzano.O.   3 mL at 07/04/18 0634   • famotidine (PEPCID) tablet 20 mg  20 mg DAILY ARISTIDES Manzano.O.   20 mg at 07/04/18 0546    Or   • famotidine (PEPCID) injection 20 mg  20 mg DAILY ARISTIDES Manzano.O.   20 mg at 07/03/18 0545   • aspirin (ASA) tablet 325 mg  325 mg DAILY ARISTIDES Manzano.O.   325 mg at 07/03/18 0950   • atorvastatin (LIPITOR) tablet 80 mg  80 mg DAILY ARISTIDES Manzano.O.   80 mg at 07/03/18 0949   • vasopressin (VASOSTRICT) 20 Units in  mL Infusion  0.03 Units/min Continuous Dwight Ann D.O. 9 mL/hr at 07/04/18 0028 0.03 Units/min at 07/04/18 0028   • amiodarone (CORDARONE) 450 mg in D5W 250 mL Infusion  0.5-1 mg/min Continuous Yury Tovar M.D. 33 mL/hr at 07/04/18 0520 1 mg/min at 07/04/18 0520     Last reviewed on 6/30/2018 10:29 AM by Oscar Broussard R.N.    Quality  Measures:  Labs reviewed, Medications reviewed and Radiology images reviewed  Pedraza catheter: Critically Ill - Requiring Accurate Measurement of Urinary Output and Unconscious / Sedated Patient on a Ventilator  Central line in place: Need for access, Vasopressors, Shock and Dialysis    DVT Prophylaxis: Contraindicated - High bleeding risk  DVT prophylaxis - mechanical: SCDs  Ulcer prophylaxis: Yes    Assessed for rehab: Patient unable to tolerate rehabilitation therapeutic regimen      Assessment and Plan:  Acute hypoxemic respiratory failure - Intubated 6/29   - Continue full vent support without changes except to wean FiO2 to goal SaO2>92%   - RT/O2 protocols   - hold on SBTs for now    - limit all sedatives   - cont diuresis/PUF for  worsening cardiogenic pulmonary edema  Out of hospital cardiac arrest s/p ROSC   - s/p TTM   - change amio to PO   - supportive care with close neurologically monitoring, concern for probable anoxic brain injury  Coronary artery disease with prior CABG   -Continue aspirin and statin  Acute on chronic systolic heart failure - EF 15-20%   - S/P prior AICD placement   - eventual BB and/or ACE-I when shock resolves  Cardiogenic shock - persistent   -Titrating vasopressor support with vasopressin to goal MAP >55 (decreased goal given low EF)   - hypervolemic  Anoxic encephalopathy - ?severity at this point although appears to be moderate   -limit sedatives   -Serial neurologic exams   - MRI not possible given AICD, EEG without seizures  Acute on chronic kidney disease - oliguric   -Renal dose medications   -Avoid nephrotoxins   - daily iHD per nephrology  Possible aspiration pneumonia   - completed Unasyn x 5 days  Diabetes mellitus type 2   - cont sliding scale insulin  History of hypertension   -Now in cardiogenic shock with high-dose vasopressor support   - eventually resume anti-HTN if needed  Dyslipidemia   -Continue statin  COPD without acute exacerbation   - cont BDs, no steroids  History of atrial fibrillation - rate controlled currently   -Chronically anticoagulant with warfarin, on hold for now given supratherapeutic INR  Hyperphosphatemia - monitor with HD, improving slightly  Thrombocytopenia - monitor for bleeding  Hypoglycemia - resolved on TFs  Severe protein calorie malnutrition - optimize nutritional status, nutritionist following  Prophylaxis, enteral nutrition, DNR    Patient remains critically ill today requiring my active management of his mechanical ventilatory support, arrhythmia and shock medications, close neurologic monitoring.  Prognosis remains poor.    High risk of deterioration and worsening vital organ dysfunction and death without the above critical care interventions.    Critical Care  Time:  35 minutes  [id number]  No time overlap  Time excludes procedures  Discussed with RN, RT, Team, Clinical pharmacist, Hospitalist, wife, nephrology, cardiology

## 2018-07-04 NOTE — PROGRESS NOTES
Bedside report from MARY Calle. Lines and drips verified. Left dialysis catheter oozing blood with 10 lb sandbag in place-- oozing appears to be decreasing. Family at bedside updated and encouraged to call.     12 hour chart check

## 2018-07-04 NOTE — PROGRESS NOTES
Hemodialysis done today, started @ 1530 and ended @ 1831 with net UF= 1000 ml. Report given to MARY Jo, see flow sheet for details.

## 2018-07-04 NOTE — PROCEDURES
DATE OF SERVICE:  07/03/2018    HISTORY OF PRESENT ILLNESS:  Patient is a 67-year-old male who is status post   cardiac and respiratory arrest event, found down for several minutes.  Patient   is now status post hypothermic protocol, off sedation, with no return of   responsiveness.  EEG is requested to rule out nonconvulsive seizure activity.    CONDITION OF RECORDING:  This is a 21-channel, portable, digital video EEG   tracing of approximately 30-minute duration.  Bipolar and referential montages   are used.  Activation procedures are not done.  The patient is unresponsive   throughout the tracing.  Painful and auditory stimulus are applied by the   technician.  The patient is on Levophed and vasopressin.    TRACING DESCRIPTION:  As the tracing begins and continues throughout its   duration, a pattern of generalized slowing is seen.  This is mostly of low   amplitude delta activity without focality.  No paroxysmal activity is seen.    With painful stimulus, there was no clear distortion at the background,   spontaneous EMG and muscle artifact is seen, especially over the right   hemisphere.    IMPRESSION:  This is a moderate to severely abnormal EEG for a patient this   age consistent with diffuse cerebral dysfunction.  No seizure activity is   seen.  The findings suggest diffuse cortical abnormality.  Clinical   correlation is suggested.       ____________________________________     MD ESPERANZA YOUNG / RITESH    DD:  07/03/2018 20:09:13  DT:  07/03/2018 20:24:27    D#:  [id number]  Job#:  [id number]

## 2018-07-04 NOTE — PROGRESS NOTES
[facility] Hospitalist Progress Note    Date of Service: 2018    Chief Complaint  67 y.o. male admitted 2018 with cardiac arrest.    Interval Problem Update  Mr. Pérez has a history of DM and HTN that had a cardiac arrest and underwent CPR and was shocked. He was admitted to the ICU on the hypothermia protocol.    RN notes that he moves his feet to stimulation though not to command.  He remains on vasopressin drip due to hypotension.  .    He is on levophed at 5. RN notes that he does have some withdrawal. [place] pads are off. He was initiated on dialysis 7/3. INR remains high.  I met with his wife and two sons at bedside and we discussed that Mr. Pérez may have severe, anoxic brain injury. As he is moving more today, family wants to continue the current course. 35 minutes spent that of which over 50% of the time was spent in counseling about encephalopathy.   Consultants/Specialty  Cardiology  Critical care. I discussed with Dr. Gonda on ICU Hot Rounds.   Nephrology. I discussed with Dr. Lyons    Disposition  ICU        Review of Systems   Unable to perform ROS: Intubated      Physical Exam  Laboratory/Imaging   Hemodynamics  Temp (24hrs), Av.7 °C (98.1 °F), Min:36 °C (96.8 °F), Max:37.3 °C (99.1 °F)   Temperature: 36.5 °C (97.7 °F), Monitored Temp: 36.7 °C (98.1 °F)  Pulse  Av.1  Min: 36  Max: 84 Heart Rate (Monitored): 75  Arterial BP: 108/40, NIBP: 116/62 CVP (mm Hg): (!) 12 MM HG    Respiratory  Gorman Vent Mode: APVCMV, Rate (breaths/min): 18, PEEP/CPAP: 10, PEEP/CPAP: 10, FiO2: 40, P Peak (PIP): 22, P MEAN: 16   Respiration: (!) 21, Pulse Oximetry: 100 %     Work Of Breathing / Effort: Vented  RUL Breath Sounds: Crackles, RML Breath Sounds: Diminished, RLL Breath Sounds: Diminished, KE Breath Sounds: Crackles, LLL Breath Sounds: Diminished    Fluids    Intake/Output Summary (Last 24 hours) at 18 0756  Last data filed at 18 0600   Gross per 24 hour   Intake            2303.1 ml   Output             1885 ml   Net            418.1 ml       Nutrition  Orders Placed This Encounter   Procedures   • Diet NPO     Standing Status:   Standing     Number of Occurrences:   1     Order Specific Question:   Type:     Answer:   Now [1]     Order Specific Question:   Restrict to:     Answer:   Strict [1]     Physical Exam   Constitutional: No distress.   Eyes:   No corneal reflexes.   Neck:   Left IJ dialysis catheter with bleeding around it.   Cardiovascular:   Distant heart sounds, regular   Pulmonary/Chest:   Vent, good air movement  No rhonchi   Abdominal: Soft. He exhibits no distension.   Genitourinary:   Genitourinary Comments: Pedraza catheter   Musculoskeletal: He exhibits edema.   Right femoral arterial line   Neurological:   He does not follow commands  Slight withdrawal vs posturing bilateral lower extremities and left arm.    Skin: Skin is warm and dry. He is not diaphoretic.   Nursing note and vitals reviewed.      Recent Labs      07/03/18   0555  07/03/18   1715  07/04/18   0535   WBC  13.3*  14.3*  13.2*   RBC  3.97*  3.75*  3.52*   HEMOGLOBIN  10.3*  9.5*  9.0*   HEMATOCRIT  33.2*  31.1*  29.1*   MCV  83.6  82.9  82.7   MCH  25.9*  25.3*  25.6*   MCHC  31.0*  30.5*  30.9*   RDW  53.0*  51.7*  51.3*   PLATELETCT  123*  127*  120*   MPV  10.9  10.7  11.2     Recent Labs      07/02/18   0400  07/03/18   0555  07/04/18   0535   SODIUM  137  136  134*   POTASSIUM  4.5  4.9  4.6   CHLORIDE  109  105  101   CO2  16*  17*  18*   GLUCOSE  91  111*  214*   BUN  50*  68*  54*   CREATININE  3.61*  5.66*  4.00*   CALCIUM  7.6*  7.1*  7.3*     Recent Labs      07/01/18   1045  07/01/18   1700  07/02/18   0013  07/02/18   0400  07/03/18   0555  07/04/18   0535   APTT  59.4*  74.2*  66.2*   --    --    --    INR  4.68*  4.75*  4.43*  4.12*  4.75*  4.20*         Recent Labs      07/02/18   0400   TRIGLYCERIDE  161*          Assessment/Plan     * Cardiac arrest (HCC)   Assessment & Plan    s/p  out of hospital arrest with ROSC  s/p amiodarone drip now transitioned to cortrak   Cardiology consulted.  His condition is concerning for anoxic brain injury. Family has elected continue current care and re-assess tomorrow.  Possible neurology consult.           Cardiogenic shock (HCC)- (present on admission)   Assessment & Plan    Requiring IV pressors.         Acute hypoxemic respiratory failure (HCC)   Assessment & Plan    Requiring intubation  Critical care consulted  He cannot tolerate weaning        Acute renal failure (ARF) (HCC)- (present on admission)   Assessment & Plan    Cr had gone up to 5.66 with poor urine output.  A temporary dialysis catheter was placed and dialysis initiated on 7/3.        Anoxic encephalopathy (HCC)- (present on admission)   Assessment & Plan    severe        Aspiration pneumonia (HCC)   Assessment & Plan    IV Unasyn complete 5 day course        Ischemic cardiomyopathy   Assessment & Plan    EF 20%  IV diuresis        Atrial fibrillation (HCC)   Assessment & Plan    Paroxysmal          Type 2 diabetes mellitus (HCC)- (present on admission)   Assessment & Plan    Uncontrolled with hyperglycemia     Monitor CBGs blood sugars labile continue sliding-scale insulin for now        Hyperlipidemia- (present on admission)   Assessment & Plan    Continue atorvastatin        Circulating anticoagulants (HCC)- (present on admission)   Assessment & Plan    On outpatient warfarin.   INR remains high likely from poor liver synthetic function        Anemia- (present on admission)   Assessment & Plan    No signs of bleeding hemoglobin stable        CKD (chronic kidney disease) stage 3, GFR 30-59 ml/min- (present on admission)   Assessment & Plan    History of          Quality-Core Measures   Reviewed items::  Radiology images reviewed, Medications reviewed and Labs reviewed  Pedraza catheter::  Unconscious / Sedated Patient on a Ventilator  DVT prophylaxis pharmacological::  Contraindicated - High  bleeding risk      Prognosis is poor.

## 2018-07-04 NOTE — DISCHARGE PLANNING
jorden ROCHE    Sw attended AM IDT Rounds.    RN reports, pt regaining corneal reflex today, able to partially withdrawal to pain today, edema worsening, dialysis yesterday,       Plan: Sw to assist w/ d/c planning as needed.

## 2018-07-04 NOTE — PROGRESS NOTES
Monitor summary: SR/partially paced  HR: 50s-70s  Measurements: paced at time of measurement  Ectopy: occasional PVC

## 2018-07-04 NOTE — PALLIATIVE CARE
Palliative Care follow-up  Spoke with MD briefly,family wishes to continue treatment at the moment.   Met with pts spouse, Peyton, for support and follow up. Per Peyton the pt opened his eyes and moved his toes today. Spoke with pts sons and Peyton, provided support and let family know to call for any needs.    Plan: Will remain available for any needs to pt and family.     Thank you for allowing Palliative Care to participate in this patient's care. Please feel free to call [phone] with any questions or concerns.

## 2018-07-04 NOTE — CARE PLAN
Problem: Nutritional:  Goal: Nutrition support tolerated and meeting greater than 85% of estimated needs  Outcome: MET Date Met: 07/04/18

## 2018-07-04 NOTE — CARE PLAN
Problem: Safety  Goal: Will remain free from injury    Intervention: Provide assistance with mobility  Patient in times 2 restraints      Problem: Urinary Elimination:  Goal: Ability to reestablish a normal urinary elimination pattern will improve    Intervention: Evaluate need to continue indwelling urinary catheter  Pt sedated on a vent

## 2018-07-04 NOTE — DISCHARGE PLANNING
Medical SW    Referral: Sw spoke to [facility].    Pt has [place]. Sw advised [facility] should speak to  RN in [facility]. If [place] wants to move pt, they will request it to happen.     If medical team here requesting pt to move, Car can support them via [facility] RN's [place] contact information. Sw can contact [place].      Plan: Sw to assist w/ d/c planning as needed.

## 2018-07-04 NOTE — PROGRESS NOTES
[facility] Services Progress Note    Hemodialysis treatment ordered today per Dr. Lyons x 3 hours. Treatment initiated at 1025 and ended at 1325.    Pt tolerated treatment, BP supported with pressor x1, albumin 25% x2, VSS throughout treatment.; see paper flow sheets for details.    Net UF 3,500 mL    Post tx, CVC flushed with saline then locked with heparin 1000 units/mL per designated amount in each wing then clamped and capped. Aspirate heparin prior to next CVC use.    Report given to Primary RN.

## 2018-07-05 NOTE — PROGRESS NOTES
Nephrology Progress Note, Adult, Complex               Author: Leena Santosvitorcarlos Date & Time created: 7/5/2018  3:34 PM     Interval History:  66 y/o male with HTN, DM II, CKD -suffered from cardiac arrest -s/p hypothermia protocol  In [place]  Intub/vent  Poor UOP  Started HD  Seen and examined during dialysis treatment -tolerates well  Review of Systems:  Review of Systems   Reason unable to perform ROS: intubated.       Physical Exam:  Physical Exam   Constitutional: He appears well-developed and well-nourished. No distress.   HENT:   Head: Normocephalic and atraumatic.   Nose: Nose normal.   Mouth/Throat: No oropharyngeal exudate.   ETT   Eyes: Conjunctivae are normal. Pupils are equal, round, and reactive to light. No scleral icterus.   Neck: No thyromegaly present.   Cardiovascular: Normal rate and regular rhythm.  Exam reveals no gallop and no friction rub.    Pulmonary/Chest:   Coarse BS  vented   Nursing note and vitals reviewed.      Labs:  Recent Labs      07/03/18   0421  07/04/18   0352  07/05/18   0401   ISTATAPH  7.372*  7.409  7.484   ISTATAPCO2  27.8  32.5  33.8   ISTATAPO2  74  74  122*   ISTATATCO2  17*  22  26   [id number]  95  95  99   ISTATARTHCO3  16.1*  20.5  25.4*   ISTATARTBE  -8*  -4  2   ISTATTEMP  35.8 C  36.1 C  37.6 C   ISTATFIO2  35  40  40   ISTATSPEC  Arterial  Arterial  Arterial   ISTATAPHTC  7.389*  7.423  7.474   [id number]  68  69  125*         Recent Labs      07/03/18   0555  07/04/18   0535  07/05/18   0500   SODIUM  136  134*  137   POTASSIUM  4.9  4.6  3.6   CHLORIDE  105  101  102   CO2  17*  18*  22   BUN  68*  54*  49*   CREATININE  5.66*  4.00*  3.78*   MAGNESIUM  3.4*  2.8*  2.5   PHOSPHORUS  8.3*  7.1*  5.3*   CALCIUM  7.1*  7.3*  7.3*     Recent Labs      07/03/18   0555  07/04/18   0535  07/05/18   0500   GLUCOSE  111*  214*  201*     Recent Labs      07/03/18   0555  07/03/18   1715  07/04/18   0535  07/05/18   0500   RBC  3.97*  3.75*  3.52*  2.89*   HEMOGLOBIN  10.3*   9.5*  9.0*  7.5*   HEMATOCRIT  33.2*  31.1*  29.1*  23.9*   PLATELETCT  123*  127*  120*  122*   PROTHROMBTM  44.4*   --   40.3*  19.2*   INR  4.75*   --   4.20*  1.65*     Recent Labs      18   0555  18   1715  18   0535  18   0500   WBC  13.3*  14.3*  13.2*  9.8   NEUTSPOLYS  88.50*   --   86.60*  83.60*   LYMPHOCYTES  3.10*   --   2.60*  3.50*   MONOCYTES  6.70   --   8.70  9.10   EOSINOPHILS  0.20   --   0.20  1.60   BASOPHILS  0.20   --   0.20  0.20           Hemodynamics:  Temp (24hrs), Av.7 °C (99.9 °F), Min:37.6 °C (99.7 °F), Max:37.8 °C (100 °F)  Temperature: 37.7 °C (99.9 °F)  Pulse  Av.6  Min: 36  Max: 84Heart Rate (Monitored): 66  Arterial BP: 128/50, NIBP: 131/51  CVP (mm Hg): (!) 209 MM HG  Respiratory:  Gorman Vent Mode: APVCMV, Rate (breaths/min): 14, PEEP/CPAP: 8, FiO2: 30, P Peak (PIP): 26, P MEAN: 16 Respiration: 15, Pulse Oximetry: 98 %     Work Of Breathing / Effort: Vented  RUL Breath Sounds: Coarse Crackles, RML Breath Sounds: Coarse Crackles, RLL Breath Sounds: Diminished, KE Breath Sounds: Coarse Crackles, LLL Breath Sounds: Diminished  Fluids:    Intake/Output Summary (Last 24 hours) at 18 1534  Last data filed at 18 1400   Gross per 24 hour   Intake           772.93 ml   Output             1600 ml   Net          -827.07 ml     Weight: 112.9 kg (248 lb 14.4 oz)  GI/Nutrition:  Orders Placed This Encounter   Procedures   • Diet NPO     Standing Status:   Standing     Number of Occurrences:   1     Order Specific Question:   Type:     Answer:   Now [1]     Order Specific Question:   Restrict to:     Answer:   Strict [1]     Medical Decision Making, by Problem:  Active Hospital Problems    Diagnosis   • *Cardiac arrest (HCC) [I46.9]   • Hypotension [I95.9]   • Acute hypoxemic respiratory failure (HCC) [J96.01]   • Cardiogenic shock (HCC) [R57.0]   • Anemia [D64.9]   • CKD (chronic kidney disease) stage 3, GFR 30-59 ml/min [N18.3]   • Anoxic  encephalopathy (HCC) [G93.1]   • Aspiration pneumonia (HCC) [J69.0]   • Atrial fibrillation (HCC) [I48.91]   • Ischemic cardiomyopathy [I25.5]   • Hyperlipidemia [E78.5]   • Type 2 diabetes mellitus (HCC) [E11.9]   • Circulating anticoagulants (HCC) [D68.318]       Quality-Core Measures   Reviewed items::  Labs reviewed and Medications reviewed  Pedraza catheter::  Critically Ill - Requiring Accurate Measurement of Urinary Output  Central line in place:  Dialysis    Assessment and plan  1.ARABELLA/CKD/ATN/HD -seen and examined during dialysis treatment -please see dialysis flow sheet for details  2.Hypotension: on pressor -to keep MAP> 65  3.Electrolytes: elevated Phos level -better -to monitor  4.Anemia: Hb stable  5.Volume:overloaded -UF with HD as tolerates    Recs: daily dialysis             Daily BMP             All meds to renal doses             Prognosis poor

## 2018-07-05 NOTE — PROGRESS NOTES
Pulmonary Critical Care Progress Note      Date of admission:  6/29/2018    Chief Complaint:  Respiratory failure    HPI: 67-year-old male with a known history   of diabetes, COPD, hypertension, 3-vessel coronary artery disease with a LIMA   to the LAD, SVGs to obtuse marginal and PDA in 04/2001, atrial fibrillation,   chronic kidney disease, ischemic cardiomyopathy with AICD pacer that presented   in cardiac arrest.  Patient's history was obtained from the family as well as   Dr. Mcdonnell from the emergency department.  The patient according to the son   [place]ally [place] and was traveling to [place] to see his family.    He saw his son and then after dinner the patient wanted to go gambling and   once the son parked the car, he found that his dad had already passed out and   had a cardiopulmonary arrest.  He feels that they ate dinner at approximately   05:00-06:00 p.m. and then the visitation with the [facility] and then going to   the [employer] was between 08:00 and 08:30 p.m.  The patient did have a bystander   CPR, although AICD was then placed and it suggested being shocked.  He was   given at least 2 rounds of epinephrine and Narcan en route.  At least one AED   shock was given.  He did develop a pulse.  While in the emergency department,   he was intubated and a femoral line was placed.  I believe he may have had a   left IO line placed during the arrest.  The patient is on Coumadin for his   underlying AICD pacer AFib and according to the son, he has normally taking   his anticoagulant medications routinely.       Interval Events:  24 hour interval history reviewed   - emesis yesterday afternoon and TFs held --> OGT with 300mL   - Tm 38.0, improved WBC   - no tracking, nor following commands, withdraws in all 4 extremities   - pacing intermittently   - NE @ 3, CVP 6-10, vaso off   - plts remain low at 122   - amio gtt back @ 1   - decreasing BUN/crt with HD (3L), Na 137, UOP 500mL   - INR down to 1.65  --> SQ heparin TID for now   - SBT x 1 hr, RSBI 47, not following for parameters   - completed abx   - drop in Hgb from 9 --> 7.5   - 5 units SSI    Review of Systems   Unable to perform ROS: Critical illness     Physical Exam   Constitutional: No distress. He is intubated.   Remains critically ill appearing   HENT:   Head: Normocephalic and atraumatic.   Nose: Nose normal.   ETT and coretrak in place   Eyes: Conjunctivae are normal. Pupils are equal, round, and reactive to light. No scleral icterus.   Neck: Neck supple. Carotid bruit is not present. No tracheal deviation present.   Cardiovascular: Normal rate, regular rhythm and intact distal pulses.   No extrasystoles are present. PMI is displaced.  Exam reveals distant heart sounds. Exam reveals no friction rub.    No murmur heard.  Unchanged paced rhythm, sternotomy scar noted, palpable AICD in left anterior chest   Pulmonary/Chest: No tachypnea. He is intubated. No respiratory distress. He has no decreased breath sounds. He has no wheezes. He has rhonchi in the left lower field. He has rales (Scattered coarse crackles bilaterally) in the right lower field and the left lower field.   Abdominal: Soft. Bowel sounds are normal. He exhibits distension. There is no tenderness. There is no rebound and no guarding.   OGT to LCWS with 300mL output of gastric contents   Genitourinary: Penis normal.   Genitourinary Comments: Pedraza cath in place, BMS   Musculoskeletal: He exhibits edema. He exhibits no tenderness or deformity.   No clubbing or cyanosis, (+) persistent hand and LE edema   Lymphadenopathy:     He has no cervical adenopathy.   Neurological: He exhibits normal muscle tone.   More spontaneous movement of bilateral lower extremities, opens eyes spontaneously but not following commands, positive corneal/gag/cough, no withdrawal to pain in upper extremities   Skin: Skin is warm and dry. He is not diaphoretic. No pallor.   Psychiatric:   Unable to assess given  current clinical condition   Nursing note and vitals reviewed.      PFSH:  No change.    Respiratory:  Gorman Vent Mode: Spont, PEEP/CPAP: 10, FiO2: 40, P Support: 5, Static Compliance (ml / cm H2O): 59.8, Weaning Trial Stopped due to:: Pt weaned for 1 hour and returned to rest settings per protocol, Length of Weaning Trial (Hours): 1, Control VTE (exp VT): 549  Pulse Oximetry: 99 % PIP 27,  improving poor compliance    CXR images personally reviewed and compared to prior images: Improving diffuse bilateral infiltrates with enlarged cardiac silhouette, left lower lobe atelectasis, endotracheal tube/gastric tube/right IJ central venous catheter/left HD catheter/AICD in good position    Recent Labs      07/03/18   0421  07/04/18   0352  07/05/18   0401   ISTATAPH  7.372*  7.409  7.484   ISTATAPCO2  27.8  32.5  33.8   ISTATAPO2  74  74  122*   ISTATATCO2  17*  22  26   [id number]  95  95  99   ISTATARTHCO3  16.1*  20.5  25.4*   ISTATARTBE  -8*  -4  2   ISTATTEMP  35.8 C  36.1 C  37.6 C   ISTATFIO2  35  40  40   ISTATSPEC  Arterial  Arterial  Arterial   ISTATAPHTC  7.389*  7.423  7.474   [id number]  68  69  125*   ABG interpretation: Respiratory and metabolic alkalosis with improving oxygenation     HemoDynamics:  Pulse: 63, Heart Rate (Monitored): 65  Arterial BP: 123/46, NIBP: (!) 129/38  CVP (mm Hg): (!) 8 MM HG   NE @ 1  Amiodarone at 1    Imaging: Echo 7/1 - CONCLUSIONS  Compared to the report of the study done on 10/29/2017 there has been   no significant change.  Severely reduced left ventricular systolic function. Left ventricular   ejection fraction is visually estimated to be 15-20%.   No evidence of valvular abnormality based on Doppler evaluation.   Estimated right ventricular systolic pressure is 45 mmHg.        Neuro:  Imaging: CT head 6/29 - 1.  No acute intracranial abnormality is identified, there are changes compatible with small vessel ischemic disease and moderate atrophy.   2.  Chronic  sinusitis changes of the right frontal, right maxillary, and left sphenoid sinus.      EEG 7/3: IMPRESSION:  This is a moderate to severely abnormal EEG for a patient this   age consistent with diffuse cerebral dysfunction.  No seizure activity is   seen.  The findings suggest diffuse cortical abnormality.  Clinical   correlation is suggested.    Fluids:  Intake/Output       07/03/18 0700 - 07/04/18 0659 07/04/18 0700 - 07/05/18 0659 07/05/18 0700 - 07/06/18 0659      0700-1859 1900-0659 Total 0700-1859 1900-0659 Total 0700-1859 1900-0659 Total       Intake    I.V.  834.3  458.8 1293.1  330.5  575.1 905.6  --  -- --    Amiodarone Volume 396 297 693 -- 330 330 -- -- --    Vasopressin Volume 104.7 108 212.7 20.6 0 20.6 -- -- --    Norepinephrine Volume 13.6 53.8 67.4 210 245.1 455.1 -- -- --    IV Piggyback Volume (Albumin 25% 12.g) -- -- -- 100 -- 100 -- -- --    IV Volume (D51/2NS) 320 -- 320 -- -- -- -- -- --    Other  --  30 30  120  0 120  --  -- --    Medications (P.O./ Enteral Liquids) -- 30 30 120 0 120 -- -- --    Dialysis  600  -- 600  400  -- 400  --  -- --    Dialysis Volume (Dialysis Intake) 600 -- 600 400 -- 400 -- -- --    Enteral  200  180 380  120  0 120  --  -- --    Free Water / Tube Flush -- 180 180 120 -- 120 -- -- --    Intake (mL) (Enteral Tube Left Nare Cortrak Small Bowel Feeding Tube) 200 -- 200 -- 0 0 -- -- --    Total Intake 1634.3 668.8 2303.1 970.5 575.1 1545.6 -- -- --       Output    Urine  160  125 285  250  500 750  --  -- --    Output (mL) (Urinary Catheter Indwelling Catheter 16) 160 125 285 250 500 750 -- -- --    Drains  --  0 0  --  -- --  --  -- --    Residual Amount (ml) (Discarded) -- 0 0 -- -- -- -- -- --    Dialysis  1600  -- 1600  4000  -- 4000  --  -- --    Dialysis Output (Dialysis Output) 1600 -- 1600 4000 -- 4000 -- -- --    Emesis/NG output  --  -- --  --  600 600  --  -- --    Output (mL) (Enteral Tube Oral Oral Gastric) -- -- -- -- 600 600 -- -- --    Total Output  4979 902 6390 4250 1100 5350 -- -- --       Net I/O     -125.7 543.8 418.1 -3279.5 -524.9 -3804.4 -- -- --        Weight: 112.9 kg (248 lb 14.4 oz)  Recent Labs      18   0518   0535  18   0500   SODIUM  136  134*  137   POTASSIUM  4.9  4.6  3.6   CHLORIDE  105  101  102   CO2  17*  18*  22   BUN  68*  54*  49*   CREATININE  5.66*  4.00*  3.78*   MAGNESIUM  3.4*  2.8*  2.5   PHOSPHORUS  8.3*  7.1*  5.3*   CALCIUM  7.1*  7.3*  7.3*       GI/Nutrition:  Imaging: Reviewed    N.p.o. with coretrak and OG tube to low continuous wall suction  Liver Function  Recent Labs      18   0518   0535  18   0500   GLUCOSE  111*  214*  201*       Heme:  Recent Labs      18   0518   0535  18   0500   RBC  3.97*  3.75*  3.52*  2.89*   HEMOGLOBIN  10.3*  9.5*  9.0*  7.5*   HEMATOCRIT  33.2*  31.1*  29.1*  23.9*   PLATELETCT  123*  127*  120*  122*   PROTHROMBTM  44.4*   --   40.3*  19.2*   INR  4.75*   --   4.20*  1.65*       Infectious Disease:  Temp  Av.7 °C (99.9 °F)  Min: 37.6 °C (99.7 °F)  Max: 37.8 °C (100 °F)  Reviewed.  None  Recent Labs      18   0518   0535  18   0500   WBC  13.3*  14.3*  13.2*  9.8   NEUTSPOLYS  88.50*   --   86.60*  83.60*   LYMPHOCYTES  3.10*   --   2.60*  3.50*   MONOCYTES  6.70   --   8.70  9.10   EOSINOPHILS  0.20   --   0.20  1.60   BASOPHILS  0.20   --   0.20  0.20     Current Facility-Administered Medications   Medication Dose Frequency Provider Last Rate Last Dose   • amiodarone (CORDARONE) 450 mg in D5W 250 mL Infusion  0.5-1 mg/min Continuous Tuan Larry M.D. 33 mL/hr at 18 0447 1 mg/min at 18 0447   • albumin human 25% solution 12.5 g  12.5 g DIALYSIS PRN Leena Lyons M.D. 150 mL/hr at 18 1230 12.5 g at 18 1230   • heparin injection 3,300 Units  3,300 Units DIALYSIS PRN Leena Lyons M.D.   3,300 Units at 18 1235   • Pharmacy Consult:  Enteral tube feeding - review meds/change route/product selection  1 Each PRN Jeremy M Gonda, M.D.       • sodium chloride 3% nebulizer solution 3 mL  3 mL 4X/DAY (RT) Jeremy M Gonda, M.D.   3 mL at 07/04/18 1836   • furosemide (LASIX) injection 40 mg  40 mg Q12HRS Jeremy M Gonda, M.D.   40 mg at 07/04/18 2021   • norepinephrine (LEVOPHED) 4 mg in  mL Infusion  0-30 mcg/min Continuous Quinn FADI Lauren M.D. 11.3 mL/hr at 07/05/18 0448 3 mcg/min at 07/05/18 0448   • insulin regular (HUMULIN R) injection 2-9 Units  2-9 Units Q6HRS Art Reed M.D.   3 Units at 07/05/18 0527    And   • glucose 4 g chewable tablet 16 g  16 g Q15 MIN PRN Art Reed M.D.        And   • dextrose 50% (D50W) injection 25 mL  25 mL Q15 MIN PRN Art Reed M.D.   25 mL at 07/02/18 0005   • Respiratory Care per Protocol   Continuous RT Dwight Ann D.O.       • senna-docusate (PERICOLACE or SENOKOT S) 8.6-50 MG per tablet 2 Tab  2 Tab BID Dwight Ann D.O.   Stopped at 07/03/18 2124    And   • polyethylene glycol/lytes (MIRALAX) PACKET 1 Packet  1 Packet QDAY PRN Dwight Ann D.O.        And   • magnesium hydroxide (MILK OF MAGNESIA) suspension 30 mL  30 mL QDAY PRN Dwight Ann D.O.        And   • bisacodyl (DULCOLAX) suppository 10 mg  10 mg QDAY PRN Dwight Ann D.O.       • ipratropium-albuterol (DUONEB) nebulizer solution  3 mL Q2HRS PRN (RT) Dwight Ann D.O.       • ipratropium-albuterol (DUONEB) nebulizer solution  3 mL Q4HRS (RT) AKHIL ManzanoO.   3 mL at 07/05/18 0643   • famotidine (PEPCID) tablet 20 mg  20 mg DAILY AKHIL ManzanoO.   20 mg at 07/04/18 0546    Or   • famotidine (PEPCID) injection 20 mg  20 mg DAILY ARISTIDES Manzano.O.   20 mg at 07/05/18 0502   • aspirin (ASA) tablet 325 mg  325 mg DAILY ARISTIDES Manzano.O.   325 mg at 07/04/18 0812   • atorvastatin (LIPITOR) tablet 80 mg  80 mg DAILY ARISTIDES Manzano.O.   80 mg at 07/04/18 0812   •  vasopressin (VASOSTRICT) 20 Units in  mL Infusion  0.03 Units/min Continuous Dwight Ann D.O.   Stopped at 07/04/18 0817     Last reviewed on 6/30/2018 10:29 AM by Oscar Broussard R.N.    Quality  Measures:  Labs reviewed, Medications reviewed and Radiology images reviewed  Pedraza catheter: Critically Ill - Requiring Accurate Measurement of Urinary Output and Unconscious / Sedated Patient on a Ventilator  Central line in place: Need for access, Vasopressors, Shock and Dialysis    DVT Prophylaxis: Heparin  DVT prophylaxis - mechanical: SCDs  Ulcer prophylaxis: Yes    Assessed for rehab: Patient unable to tolerate rehabilitation therapeutic regimen      Assessment and Plan:  Acute hypoxemic respiratory failure - Intubated 6/29   - Continue full vent support with decrease in PEEP to 8   - RT/O2 protocols   - resume daily SBTs   - limit all sedatives   - cont diuresis/PUF for cardiogenic pulmonary edema  Out of hospital cardiac arrest s/p ROSC   - s/p TTM   - change amio again to PO (7 gm load thus far)   - supportive care with close neurologically monitoring, concern for probable anoxic brain injury  Coronary artery disease with prior CABG   -Continue aspirin and statin  Acute on chronic systolic heart failure - EF 15-20%   - S/P prior AICD placement   - eventual BB and/or ACE-I when shock resolves  Cardiogenic shock - improving   -Titrating vasopressor support with NE to goal MAP >55   - remains hypervolemic  Anoxic encephalopathy - ?severity at this point although appears to be moderate   -limit sedatives   -Serial neurologic exams, ?neurology consultation in 1-2 days   - MRI not possible given AICD, EEG without seizures  Acute on chronic kidney disease - oliguric   -Renal dose medications   -Avoid nephrotoxins   - daily iHD per nephrology  Possible aspiration pneumonia   - completed Unasyn x 5 days  Diabetes mellitus type 2   - cont sliding scale insulin  History of hypertension   -Now in cardiogenic shock  with high-dose vasopressor support   - eventually resume anti-HTN if needed  Dyslipidemia   -Continue statin  COPD without acute exacerbation   - cont BDs, no steroids  History of atrial fibrillation - rate controlled currently   -Chronically anticoagulant with warfarin, originally on hold for now given supratherapeutic INR, start on SQ heparin for today  Hyperphosphatemia - monitor with HD, improving slightly  Thrombocytopenia - monitor for bleeding  Hypoglycemia - resolved on TFs  Severe protein calorie malnutrition - optimize nutritional status, nutritionist following  Acute blood loss anemia    - monitor for bleeding, conservative transfusion strategy  Ileus   - resume TFs today via SB coretrak and monitor, cont OGT to LCWS   - bowel protocol  Prophylaxis, enteral nutrition, DNR    Patient remains critically ill today requiring my active management of his ongoing mechanical ventilatory support, norepinephrine infusion, GI intolerance.    High risk of deterioration and worsening vital organ dysfunction and death without the above critical care interventions.    Critical Care Time:  33 minutes  [id number]  No time overlap  Time excludes procedures  Discussed with RN, RT, Team, Clinical pharmacist, Hospitalist, wife/son, nephrology

## 2018-07-05 NOTE — CARE PLAN
Problem: Ventilation Defect:  Goal: Ability to achieve and maintain unassisted ventilation or tolerate decreased levels of ventilator support  Outcome: PROGRESSING AS EXPECTED  Adult Ventilation Update    Total Vent Days: 7  RR: 14 VT: 440 PEEP: 8 FiO2: 30%    Patient Lines/Drains/Airways Status    Active Airway     Name: Placement date: Placement time: Site: Days:    Airway Group ET Tube Oral 8.0 06/29/18 2131   Oral   7              In the last 24 hours, the patient tolerated SBT for 1 hr on settings of 5/10.    #FVC / Vital Capacity (liters) :  (Patient not following) (07/05/18 0747)  NIF (cm H2O) :  (Patient not following) (07/05/18 0747)  Rapid Shallow Breathing Index (RR/VT): 47 (07/05/18 0747)  Plateau Pressure (Q Shift): 22 (07/05/18 1102)  Static Compliance (ml / cm H2O): 74.9 (07/05/18 1514)    Sputum/Suction   Cough: Productive (07/05/18 1514)  Sputum Amount: Small (07/05/18 1514)  Sputum Color: Tan;Bloody;Brown (07/05/18 1514)  Sputum Consistency: Thick (07/05/18 1514)    Events/Summary/Plan: No vent changes made at this time (07/05/18 1514)

## 2018-07-05 NOTE — CARE PLAN
Problem: Ventilation Defect:  Goal: Ability to achieve and maintain unassisted ventilation or tolerate decreased levels of ventilator support  Outcome: PROGRESSING AS EXPECTED    Intervention: Support and monitor invasive and noninvasive mechanical ventilation  Adult Ventilation Update    Total Vent Days: 7    Patient Lines/Drains/Airways Status    Active Airway     Name: Placement date: Placement time: Site: Days:    Airway Group ET Tube Oral 8.0 06/29/18   2131   Oral   7              Vent settings: 18 440 +10 30%  Duoneb Q4, 3% QID     Sputum/Suction   Cough: Productive (07/05/18 0227)  Sputum Amount: Small (07/05/18 0227)  Sputum Color: Bloody (07/05/18 0227)  Sputum Consistency: Thick (07/05/18 0227)        Events/Summary/Plan: Abg drawn, no changes made. Pt stable on vent, will continue to monitor.

## 2018-07-05 NOTE — PROGRESS NOTES
Bedside report from MARY Calle. Lines and drips verified. Family updated and encouraged to ask questions.     12 hour chart check

## 2018-07-05 NOTE — PROGRESS NOTES
[facility] Hospitalist Progress Note    Date of Service: 2018    Chief Complaint  67 y.o. male admitted 2018 with cardiac arrest.    Interval Problem Update  Mr. Pérez has a history of DM and HTN that had a cardiac arrest and underwent CPR and was shocked. He was admitted to the ICU on the hypothermia protocol.    RN notes that he moves his feet to stimulation though not to command.  He remains on vasopressin drip due to hypotension.  .    He was initiated on dialysis 7/3. .  I met with his wife and son at bedside and we discussed that Mr. Pérez may have severe, anoxic brain injury. As he moving spontaneously and coughing, family wants to continue the current course with neuro consult on .  Yesterday he had emesis and tube feeds were held. RN notes he has a cough, gag, and corneal reflex. He has been in a paced rhythm intermittently. He had 3 liters removed by dialysis on  and 500 ml urine over night.   Consultants/Specialty  Cardiology  Critical care. I discussed with Dr. Gonda on ICU Hot Rounds.   Nephrology. I discussed with Dr. Lyons    Disposition  ICU        Review of Systems   Unable to perform ROS: Intubated      Physical Exam  Laboratory/Imaging   Hemodynamics  Temp (24hrs), Av.7 °C (99.9 °F), Min:37.6 °C (99.7 °F), Max:37.8 °C (100 °F)   Temperature: 37.7 °C (99.9 °F)  Pulse  Av.5  Min: 36  Max: 84 Heart Rate (Monitored): 65  Arterial BP: 123/46, NIBP: (!) 129/38 CVP (mm Hg): (!) 8 MM HG    Respiratory  Gorman Vent Mode: Spont, Rate (breaths/min): 18, PEEP/CPAP: 10, PEEP/CPAP: 10, FiO2: 40, P Peak (PIP): 18, P MEAN: 12   Respiration: (!) 21, Pulse Oximetry: 99 %     Work Of Breathing / Effort: Vented  RUL Breath Sounds: Crackles, RML Breath Sounds: Diminished, RLL Breath Sounds: Diminished, KE Breath Sounds: Crackles, LLL Breath Sounds: Diminished    Fluids    Intake/Output Summary (Last 24 hours) at 18 0756  Last data filed at 18 0600   Gross per 24 hour   Intake           1545.64 ml   Output             5350 ml   Net         -3804.36 ml       Nutrition  Orders Placed This Encounter   Procedures   • Diet NPO     Standing Status:   Standing     Number of Occurrences:   1     Order Specific Question:   Type:     Answer:   Now [1]     Order Specific Question:   Restrict to:     Answer:   Strict [1]     Physical Exam   Constitutional: He appears well-nourished. No distress.   Eyes:   slight corneal reflexes.   Neck:   Left IJ dialysis catheter .   Cardiovascular:   Distant heart sounds, regular   Pulmonary/Chest:   Vent, good air movement  No rhonchi   Abdominal: Soft. He exhibits no distension.   Genitourinary:   Genitourinary Comments: Pedraza catheter   Musculoskeletal: He exhibits edema.   Right femoral arterial line  More edema left hand than right   Neurological:   He does not follow commands nor open eyes to command. + spontaneous movement both feet though no clear withdrawal to pain    Skin: Skin is warm and dry. He is not diaphoretic.   Nursing note and vitals reviewed.      Recent Labs      07/03/18   1715  07/04/18   0535  07/05/18   0500   WBC  14.3*  13.2*  9.8   RBC  3.75*  3.52*  2.89*   HEMOGLOBIN  9.5*  9.0*  7.5*   HEMATOCRIT  31.1*  29.1*  23.9*   MCV  82.9  82.7  82.7   MCH  25.3*  25.6*  26.0*   MCHC  30.5*  30.9*  31.4*   RDW  51.7*  51.3*  51.0*   PLATELETCT  127*  120*  122*   MPV  10.7  11.2  11.9     Recent Labs      07/03/18   0555  07/04/18   0535  07/05/18   0500   SODIUM  136  134*  137   POTASSIUM  4.9  4.6  3.6   CHLORIDE  105  101  102   CO2  17*  18*  22   GLUCOSE  111*  214*  201*   BUN  68*  54*  49*   CREATININE  5.66*  4.00*  3.78*   CALCIUM  7.1*  7.3*  7.3*     Recent Labs      07/03/18   0555  07/04/18   0535  07/05/18   0500   INR  4.75*  4.20*  1.65*                  Assessment/Plan     * Cardiac arrest (HCC)   Assessment & Plan    s/p out of hospital arrest with ROSC  s/p amiodarone drip now transitioned to cortrak   Cardiology  consulted.  His condition is concerning for anoxic brain injury. Family has elected continue current care and re-assess tomorrow.  Neurology consult on 7/7.           Cardiogenic shock (HCC)- (present on admission)   Assessment & Plan    Requiring IV pressors.         Acute hypoxemic respiratory failure (HCC)   Assessment & Plan    Requiring intubation  Critical care consulted  He cannot tolerate weaning        Acute renal failure (ARF) ([facility])- (present on admission)   Assessment & Plan    Cr had gone up to 5.66 with poor urine output.  A temporary dialysis catheter was placed and dialysis initiated on 7/3.  He has been making urine.         Anoxic encephalopathy (HCC)- (present on admission)   Assessment & Plan    Severe  EEG negative for seizures  Neurology consult on 7/7        Aspiration pneumonia ([facility])   Assessment & Plan    IV Unasyn completed 5 day course        Ischemic cardiomyopathy   Assessment & Plan    EF 20%  IV diuresis with lasix        Atrial fibrillation ([facility])   Assessment & Plan    Paroxysmal          Type 2 diabetes mellitus ([facility])- (present on admission)   Assessment & Plan    Uncontrolled with hyperglycemia     Monitor CBGs blood sugars labile continue sliding-scale insulin for now        Hyperlipidemia- (present on admission)   Assessment & Plan    Continue atorvastatin        Circulating anticoagulants ([facility])- (present on admission)   Assessment & Plan    On outpatient warfarin.   INR trended down.  Hb has dropped thus refrain from weight-based.        Anemia- (present on admission)   Assessment & Plan    No signs of bleeding hemoglobin stable        CKD (chronic kidney disease) stage 3, GFR 30-59 ml/min- (present on admission)   Assessment & Plan    History of          Quality-Core Measures   Reviewed items::  Radiology images reviewed, Medications reviewed and Labs reviewed  Pedraza catheter::  Unconscious / Sedated Patient on a Ventilator  DVT prophylaxis pharmacological::  Contraindicated -  High bleeding risk      Prognosis is poor.

## 2018-07-05 NOTE — PROGRESS NOTES
Dr. Larry notified of pt being started on PO amiodarone, but had an emesis episode and is now NPO with OG to low wall suction. Per MD, ok to restart IV amiodarone. T/O RBV.

## 2018-07-05 NOTE — DISCHARGE PLANNING
Medical SW    Sw attended AM IDT Rounds.    RN reports, pt not following, not tracking, has gag and corneal relfexes, no purposeful movement, not withdrawing to pain,     Plan: Sw to assist w/ d/c planning as needed.

## 2018-07-05 NOTE — CARE PLAN
Problem: Venous Thromboembolism (VTW)/Deep Vein Thrombosis (DVT) Prevention:  Goal: Patient will participate in Venous Thrombosis (VTE)/Deep Vein Thrombosis (DVT)Prevention Measures  Outcome: PROGRESSING AS EXPECTED   07/05/18 0305   Mechanical/VTE Prophylaxis   Mechanical Prophylaxis  SCDs, Sequential Compression Device   SCDs, Sequential Compression Device On       Problem: Knowledge Deficit  Goal: Knowledge of disease process/condition, treatment plan, diagnostic tests, and medications will improve  Outcome: PROGRESSING AS EXPECTED  Discussed POC with pt's son, Ray, and included: medications, frequent monitoring, test and lab draws. Questions and concerns addressed.       Problem: Urinary Elimination:  Goal: Ability to reestablish a normal urinary elimination pattern will improve  Outcome: PROGRESSING AS EXPECTED   07/05/18 0305   OTHER   Urinary Elimination Anuric/ Dialysis Patient;Catheter (Document on LDA);Oliguria     Urine output better than last night and has already put out 225 mL.     Problem: Skin Integrity  Goal: Risk for impaired skin integrity will decrease  Outcome: PROGRESSING AS EXPECTED  Turning pt every two hours, full bed bath given and skin assessed thoroughly. Generalized small skin tears along with blisters-- generalized edema. Sacrum in good condition. BMS flushed per protocol and skin barrier applied.     Problem: Nutrition Deficit  Goal: Enteral Nutrition Management  Outcome: PROGRESSING SLOWER THAN EXPECTED  Tube feeds held due to pt vomiting during day-- OG in place to low cont suction.

## 2018-07-05 NOTE — PROGRESS NOTES
Patient began to vomit dark fluid. Patient was cleaned and then continued to vomit large amounts. OG to suction was placed, including the emesis about a liter came up. Dr Stuart notified. Tube feed paused. Xray ordered

## 2018-07-05 NOTE — PROGRESS NOTES
Patient is having five beat runs of PVCs. Overall ectopy is increase with frequent PVCs. Dr Stuart notified. Orders to monitor and update with changes

## 2018-07-06 NOTE — CARE PLAN
Problem: Ventilation Defect:  Goal: Ability to achieve and maintain unassisted ventilation or tolerate decreased levels of ventilator support  Outcome: PROGRESSING AS EXPECTED  Adult Ventilation Update    Total Vent Days: 8  RR: 14 VT: 440 PEEP: 8 FiO2: 30%    Patient Lines/Drains/Airways Status    Active Airway     Name: Placement date: Placement time: Site: Days:    Airway Group ET Tube Oral 8.0 06/29/18 2131   Oral   8              In the last 24 hours, the patient tolerated SBT for 1 hr on settings of 5/8.    #FVC / Vital Capacity (liters) :  (Not following) (07/06/18 0744)  NIF (cm H2O) :  (Not following) (07/06/18 0744)  Rapid Shallow Breathing Index (RR/VT): 45 (07/06/18 0744)  Plateau Pressure (Q Shift): 20 (07/06/18 1024)  Static Compliance (ml / cm H2O): 52.9 (07/06/18 1429)    Sputum/Suction   Cough: Productive (07/06/18 1429)  Sputum Amount: Small (07/06/18 1429)  Sputum Color: Tan (07/06/18 1429)  Sputum Consistency: Thick (07/06/18 1429)    Events/Summary/Plan: No vent changes made at this time (07/06/18 1429)

## 2018-07-06 NOTE — PROGRESS NOTES
HD treatment today per routine order.Treatment tolerated well using LIJ temp CVC.Didn't require any albumin or pressor for bp support.Net UF removed 2500 ml.Report given to primary Rn.

## 2018-07-06 NOTE — PROGRESS NOTES
Pulmonary Critical Care Progress Note      Date of admission:  6/29/2018    Chief Complaint:  Respiratory failure    HPI: 67-year-old male with a known history   of diabetes, COPD, hypertension, 3-vessel coronary artery disease with a LIMA   to the LAD, SVGs to obtuse marginal and PDA in 04/2001, atrial fibrillation,   chronic kidney disease, ischemic cardiomyopathy with AICD pacer that presented   in cardiac arrest.  Patient's history was obtained from the family as well as   Dr. Mcdonnell from the emergency department.  The patient according to the son   [place] and was traveling to [place] to see his family.    He saw his son and then after dinner the patient wanted to go gambling and   once the son parked the car, he found that his dad had already passed out and   had a cardiopulmonary arrest.  He feels that they ate dinner at approximately   05:00-06:00 p.m. and then the visitation with the grandkids and then going to   the [employer] was between 08:00 and 08:30 p.m.  The patient did have a bystander   CPR, although AICD was then placed and it suggested being shocked.  He was   given at least 2 rounds of epinephrine and Narcan en route.  At least one AED   shock was given.  He did develop a pulse.  While in the emergency department,   he was intubated and a femoral line was placed.  I believe he may have had a   left IO line placed during the arrest.  The patient is on Coumadin for his   underlying AICD pacer AFib and according to the son, he has normally taking   his anticoagulant medications routinely.       Interval Events:  24 hour interval history reviewed   - off levo since last night, CVP 4-8   - unchanged neuro status   - HD this morning    - AF   - more alkalotic this morning    - Hgb unchanged at 7.5   - TFs back at 30 without emesis, OGT to LCWS   - UOP around 500 q shift still   - plts down to 94   - Na 139 with HD   - SBT x 1 hr: RSB 45, not following for parameters, VD # 8   - neuro  consult tomorrow    Review of Systems   Unable to perform ROS: Critical illness     Physical Exam   Constitutional: No distress. He is intubated.   Remains critically ill appearing - unchanged   HENT:   Head: Normocephalic and atraumatic.   Mouth/Throat: Oropharynx is clear and moist.   ETT and coretrak in place, OGT to gravity   Eyes: Conjunctivae are normal. Pupils are equal, round, and reactive to light. Right eye exhibits no discharge. Left eye exhibits no discharge.   ?visual tracking today, minimal response to visual threat   Neck: Neck supple. No JVD present. Carotid bruit is not present.   Cardiovascular: Normal rate, regular rhythm and intact distal pulses.   No extrasystoles are present. PMI is displaced.  Exam reveals distant heart sounds. Exam reveals no gallop.    No murmur heard.  Continuous paced rhythm, sternotomy scar noted, palpable AICD in left anterior chest   Pulmonary/Chest: No tachypnea. He is intubated. No respiratory distress. He has no decreased breath sounds. He has no wheezes. He has rhonchi in the right lower field and the left lower field. He has rales (Scattered coarse crackles bilaterally).   Abdominal: Soft. Bowel sounds are normal. He exhibits distension. He exhibits no mass. There is no tenderness. There is no rebound.   Tolerating TFs at 30mL   Genitourinary: Penis normal.   Genitourinary Comments: Pedraza cath in place with dark urine   Musculoskeletal: He exhibits edema. He exhibits no tenderness or deformity.   No clubbing or cyanosis, (+) worsening hand and improving LE edema   Lymphadenopathy:     He has no cervical adenopathy.   Neurological: He exhibits normal muscle tone.   Withdraws to pain in bilateral lower extremities briskly, none in upper extremities, opens eyes to voice with possible visual tracking slightly today, not following commands, no facial droop   Skin: Skin is warm and dry. No erythema.   Psychiatric:   Unable to assess given current clinical condition    Nursing note and vitals reviewed.      PFSH:  No change.    Respiratory:  Gorman Vent Mode: Spont, Rate (breaths/min): 14, Vt Target (mL): 440, PEEP/CPAP: 8, FiO2: 30, P Support: 5, Static Compliance (ml / cm H2O): 72.7, Weaning Trial Stopped due to:: Pt weaned for 1 hour and returned to rest settings per protocol, Length of Weaning Trial (Hours): 1, Control VTE (exp VT): 263  Pulse Oximetry: 92 % PIP 24,  improving compliance    CXR images personally reviewed and compared to prior images: Unchanged moderate perihilar fullness/edema with enlarged cardiac silhouette, tiny left pleural effusion, endotracheal tube/gastric tube/right IJ central venous catheter/left IJ hemodialysis catheter/AICD in good position    Recent Labs      07/04/18   0352  07/05/18   0401  07/06/18   0410   ISTATAPH  7.409  7.484  7.524*   ISTATAPCO2  32.5  33.8  33.3   ISTATAPO2  74  122*  71   ISTATATCO2  22  26  28   EDHKUEF2GZV  95  99  96   ISTATARTHCO3  20.5  25.4*  27.5*   ISTATARTBE  -4  2  4*   ISTATTEMP  36.1 C  37.6 C  37.5 C   ISTATFIO2  40  40  30   ISTATSPEC  Arterial  Arterial  Arterial   ISTATAPHTC  7.423  7.474  7.516*   OYRACLYV0LL  69  125*  73   ABG interpretation: Worsening metabolic and respiratory alkalosis with adequate oxygenation    HemoDynamics:  Pulse: 71, Heart Rate (Monitored): 72  Arterial BP: 117/45, NIBP: (!) 112/38  CVP (mm Hg): (!) 8 MM HG CVP 4-8    Imaging: Echo 7/1 - CONCLUSIONS  Compared to the report of the study done on 10/29/2017 there has been   no significant change.  Severely reduced left ventricular systolic function. Left ventricular   ejection fraction is visually estimated to be 15-20%.   No evidence of valvular abnormality based on Doppler evaluation.   Estimated right ventricular systolic pressure is 45 mmHg.        Neuro:  Imaging: CT head 6/29 -    1.  No acute intracranial abnormality is identified, there are changes compatible with small vessel ischemic disease and moderate atrophy.   2.   Chronic sinusitis changes of the right frontal, right maxillary, and left sphenoid sinus.      EEG 7/3: IMPRESSION:  This is a moderate to severely abnormal EEG for a patient this   age consistent with diffuse cerebral dysfunction.  No seizure activity is   seen.  The findings suggest diffuse cortical abnormality.  Clinical   correlation is suggested.    Fluids:  Intake/Output       07/04/18 0700 - 07/05/18 0659 07/05/18 0700 - 07/06/18 0659 07/06/18 0700 - 07/07/18 0659 0700-1859 1900-0659 Total 0700-1859 1900-0659 Total 0700-1859 1900-0659 Total       Intake    I.V.  330.5  575.1 905.6  257.8  64.8 322.6  --  -- --    Amiodarone Volume -- 330 330 97.9 -- 97.9 -- -- --    Vasopressin Volume 20.6 0 20.6 -- -- -- -- -- --    Norepinephrine Volume 210 245.1 455.1 159.9 64.8 224.7 -- -- --    IV Piggyback Volume (Albumin 25% 12.g) 100 -- 100 -- -- -- -- -- --    Other  120  0 120  --  60 60  --  -- --    Medications (P.O./ Enteral Liquids) 120 0 120 -- 60 60 -- -- --    Dialysis  400  -- 400  500  -- 500  --  -- --    Dialysis Volume (Dialysis Intake) 400 -- 400 500 -- 500 -- -- --    Enteral  120  0 120  --  240 240  --  -- --    Free Water / Tube Flush 120 -- 120 -- 240 240 -- -- --    Intake (mL) (Enteral Tube Left Nare Cortrak Small Bowel Feeding Tube) -- 0 0 -- -- -- -- -- --    Total Intake 970.5 575.1 1545.6 757.8 364.8 1122.6 -- -- --       Output    Urine  250  500 750  625  550 1175  --  -- --    Output (mL) (Urinary Catheter Indwelling Catheter 16) 250 500 750  -- -- --    Drains  --  -- --  --  0 0  --  -- --    Residual Amount (ml) (Discarded) -- -- -- -- 0 0 -- -- --    Dialysis  4000  -- 4000  1500  -- 1500  --  -- --    Dialysis Output (Dialysis Output) 4000 -- 4000 1500 -- 1500 -- -- --    Stool  --  -- --  400  -- 400  --  -- --    Output (mL) (Rectal Tube Group Bowel Management System) -- -- -- 400 -- 400 -- -- --    Emesis/NG output  --  600 600  --  -- --  --  -- --    Output (mL)  (Enteral Tube Oral Oral Gastric) -- 600 600 -- -- -- -- -- --    Total Output 4250 1100 5350 2525 550 3075 -- -- --       Net I/O     -3279.5 -524.9 -3804.4 -1767.2 -185.2 -1952.4 -- -- --           Recent Labs      18   0535  18   0500  18   0550   SODIUM  134*  137  139   POTASSIUM  4.6  3.6  3.6   CHLORIDE  101  102  103   CO2  18*  22  27   BUN  54*  49*  43*   CREATININE  4.00*  3.78*  3.13*   MAGNESIUM  2.8*  2.5  2.2   PHOSPHORUS  7.1*  5.3*  3.7   CALCIUM  7.3*  7.3*  7.3*       GI/Nutrition:  Imaging: Reviewed    N.p.o. with coretrak and tube feeds at 30 mL's per hour and OG tube to gravity  Liver Function  Recent Labs      18   0535  18   0500  18   0550   GLUCOSE  214*  201*  247*       Heme:  Recent Labs      18   0535  18   0500  18   0550   RBC  3.52*  2.89*  2.94*   HEMOGLOBIN  9.0*  7.5*  7.5*   HEMATOCRIT  29.1*  23.9*  24.1*   PLATELETCT  120*  122*  94*   PROTHROMBTM  40.3*  19.2*   --    INR  4.20*  1.65*   --        Infectious Disease:  Temp  Av.3 °C (99.1 °F)  Min: 36.4 °C (97.5 °F)  Max: 37.6 °C (99.7 °F)  Reviewed.  None  Recent Labs      18   0500  18   0550   WBC  13.2*  9.8  7.5   NEUTSPOLYS  86.60*  83.60*  73.70*   LYMPHOCYTES  2.60*  3.50*  6.10*   MONOCYTES  8.70  9.10  11.20   EOSINOPHILS  0.20  1.60  5.90   BASOPHILS  0.20  0.20  0.30     Current Facility-Administered Medications   Medication Dose Frequency Provider Last Rate Last Dose   • amiodarone (CORDARONE) tablet 400 mg  400 mg TWICE DAILY Samuel Stuart M.D.   400 mg at 18   • heparin injection 2,000 Units  2,000 Units TALISHA Lyons M.D.   2,000 Units at 18 0650   • albumin human 25% solution 12.5 g  12.5 g DIALYSIS PRRADHA Lyons M.D. 150 mL/hr at 18 1230 12.5 g at 18 1230   • heparin injection 3,300 Units  3,300 Units DIALYSIS PRRADHA Lyons M.D.   3,300 Units at 18 0723   • Pharmacy Consult:  Enteral tube feeding - review meds/change route/product selection  1 Each PRN Jeremy M Gonda, M.D.       • sodium chloride 3% nebulizer solution 3 mL  3 mL 4X/DAY (RT) Jeremy M Gonda, M.D.   3 mL at 07/06/18 0635   • furosemide (LASIX) injection 40 mg  40 mg Q12HRS Jeremy M Gonda, M.D.   40 mg at 07/05/18 2011   • norepinephrine (LEVOPHED) 4 mg in  mL Infusion  0-30 mcg/min Continuous Quinn FADI Lauren M.D.   Stopped at 07/06/18 0130   • insulin regular (HUMULIN R) injection 2-9 Units  2-9 Units Q6HRS Art Reed M.D.   3 Units at 07/06/18 0602    And   • glucose 4 g chewable tablet 16 g  16 g Q15 MIN PRN Art Reed M.D.        And   • dextrose 50% (D50W) injection 25 mL  25 mL Q15 MIN PRN Art Reed M.D.   25 mL at 07/02/18 0005   • Respiratory Care per Protocol   Continuous RT Dwight Ann D.O.       • senna-docusate (PERICOLACE or SENOKOT S) 8.6-50 MG per tablet 2 Tab  2 Tab BID ARISTIDES Manzano.O.   Stopped at 07/05/18 2011    And   • polyethylene glycol/lytes (MIRALAX) PACKET 1 Packet  1 Packet QDAY PRN ARISTIDES Manzano.WILMER.        And   • magnesium hydroxide (MILK OF MAGNESIA) suspension 30 mL  30 mL QDAY PRN ARISTIDES Manzano.O.        And   • bisacodyl (DULCOLAX) suppository 10 mg  10 mg QDAY PRN AKHIL ManzanoO.       • ipratropium-albuterol (DUONEB) nebulizer solution  3 mL Q2HRS PRN (RT) AKHIL ManzanoO.       • ipratropium-albuterol (DUONEB) nebulizer solution  3 mL Q4HRS (RT) AKHIL ManzanoOMirza   3 mL at 07/06/18 0635   • famotidine (PEPCID) tablet 20 mg  20 mg DAILY AKHIL ManzanoO.   20 mg at 07/06/18 0554    Or   • famotidine (PEPCID) injection 20 mg  20 mg DAILY ARISTIDES Manzano.O.   20 mg at 07/05/18 0502   • aspirin (ASA) tablet 325 mg  325 mg DAILY AKHIL ManzanoO.   325 mg at 07/05/18 0832   • atorvastatin (LIPITOR) tablet 80 mg  80 mg DAILY ARISTIDES Manzano.O.   80 mg at 07/05/18 0900     Last reviewed on 6/30/2018  10:29 AM by Oscar Broussard R.N.    Quality  Measures:  Labs reviewed, Medications reviewed and Radiology images reviewed  Pedraza catheter: Critically Ill - Requiring Accurate Measurement of Urinary Output and Unconscious / Sedated Patient on a Ventilator  Central line in place: Need for access and Dialysis    DVT Prophylaxis: Heparin  DVT prophylaxis - mechanical: SCDs  Ulcer prophylaxis: Yes    Assessed for rehab: Patient unable to tolerate rehabilitation therapeutic regimen      Assessment and Plan:  Acute hypoxemic respiratory failure - Intubated 6/29   - Continue full vent support without extubation given mentation   - RT/O2 protocols   - cont daily SBTs   - limit all sedatives   - cont diuresis/PUF for cardiogenic pulmonary edema  Out of hospital cardiac arrest s/p ROSC s/p TTM   - amio PO   - supportive care with close neurologically monitoring, concern for probable anoxic brain injury  Coronary artery disease with prior CABG   -Continue aspirin and statin  Acute on chronic systolic heart failure - EF 15-20%   - S/P prior AICD placement   - eventual BB and/or ACE-I when shock resolves  Cardiogenic shock - improved   - monitor BP with goal MAP >55, prn NE gtt for now   - remains hypervolemic  Anoxic encephalopathy - ?severity at this point although appears to be moderate   -limit sedatives   -Serial neurologic exams, neurology consultation tomorrow unless significant improvement seen   - MRI not possible given AICD, EEG without seizures  Acute on chronic kidney disease - remains oliguric   -Renal dose medications   -Avoid nephrotoxins   -daily iHD per nephrology  Possible aspiration pneumonia   - completed Unasyn x 5 days  Diabetes mellitus type 2   - cont sliding scale insulin  History of hypertension   -Now in cardiogenic shock with high-dose vasopressor support   - eventually resume anti-HTN if needed  Dyslipidemia   -Continue statin  COPD without acute exacerbation   - cont BDs, no steroids  History of atrial  fibrillation - rate controlled currently   -Chronically anticoagulant with warfarin, originally on hold for now given supratherapeutic INR, SQ heparin for now   - recheck INR today  Hyperphosphatemia - monitor with HD, improving slightly  Thrombocytopenia - monitor for bleeding  Hypoglycemia - resolved on TFs  Severe protein calorie malnutrition - optimize nutritional status, nutritionist following  Acute blood loss anemia    - monitor for bleeding, conservative transfusion strategy  Ileus   - increase TFs today to goal via coretrak and monitor, cont OGT for now   - bowel protocol  Prophylaxis, enteral nutrition, DNR    Patient remains critically ill today requiring my active management of his ongoing mechanical ventilatory support, monitoring and management of anoxic encephalopathy, fluid status.    High risk of deterioration and worsening vital organ dysfunction and death without the above critical care interventions.    Critical Care Time:  32 minutes  [id number]  No time overlap  Time excludes procedures  Discussed with RN, RT, Team, Clinical pharmacist, Hospitalist, wife.

## 2018-07-06 NOTE — PROGRESS NOTES
Hd treatment ordered by Dr Lyons started at 1420 and ended at 1722 with net uf of 1000 ml as bp tolerated. Prior to hd, called for HD orders. Tolerated hd treatment well with no significanr change in condition post treatment. See flow sheet for details.

## 2018-07-06 NOTE — PALLIATIVE CARE
"      Spiritual Care Note           Patient's Name: Rell Pérez   MRN: [id number]    Age and Gender: 67 y.o. male   YOB: 1950   Place of Residence: [place], [place]   Family/Friends/Others Present: 4 family members   Unit: Cardiac ICU   Room (and Bed): [place] [address]   Ethnicity or Nationality:    Primary Language: English   Medical Diagnosis(-es)/Procedure(s): acute hypoxemic respiratory failure   out of hospital cardiac arrest s  coronary artery disease with prior CABG  acute on chronic systolic heart failure  cardiogenic shock   anoxic encephalopathy    Confucianist Affiliation: Taoism   Code Status: Comfort Care/DNR    Date of Admission: 6/29/2018    Length of Stay: 6 days   Date of Visit: 7/6/2018       Situation/Reason for Visit:  Patient followed by palliative care.  This  knows patient and wife from previous visit.    Background:  See above diagnoses.    Summary of Interaction/Conversation with Patient and/or Family/Friends/Others:  Family requesting a  because they are about to place patient on comfort care. and would like the \"last rites.\"    Assessment of Cultural/Social/Emotional/Spiritual Issues:  Seeking final sacrament for patient.    Consultations/Referrals:  Spoke with Fr. Alfredo Pardo who will visit within a half-hour.    Requests/Recommendations:  none    Continuing Care:  Will continue to follow.      Contact Information:  Chaplain TABITHA Vences  [phone]   [email or web address]  "

## 2018-07-06 NOTE — CARE PLAN
Problem: Infection  Goal: Will remain free from infection  Outcome: PROGRESSING AS EXPECTED  CHG bath completed, stephen care completed, hand hygiene performed and central line dressings changed when soile.     Problem: Bowel/Gastric:  Goal: Will not experience complications related to bowel motility  Outcome: PROGRESSING AS EXPECTED  Loose, green stool tonight-- BMS flushed per protocol.    Problem: Urinary Elimination:  Goal: Ability to reestablish a normal urinary elimination pattern will improve  Outcome: PROGRESSING SLOWER THAN EXPECTED   07/06/18 0738   OTHER   Urinary Elimination Catheter (Document on LDA)     Improved urine output tonight, put out 550 mL.

## 2018-07-06 NOTE — PROGRESS NOTES
Monitor summary: pt has been SR/Paced on demand.  HR: 60s-70s  Ectopy: frequent to occasional PVC

## 2018-07-06 NOTE — PROGRESS NOTES
[facility] Hospitalist Progress Note    Date of Service: 2018    Chief Complaint  67 y.o. male admitted 2018 with cardiac arrest.    Interval Problem Update  Mr. Pérez has a history of DM and HTN that had a cardiac arrest and underwent CPR and was shocked. He was admitted to the ICU on the hypothermia protocol.  .    He was initiated on dialysis 7/3 and has undergone daily dialysis.   I met with his wife and son as well as other family at bedside and we discussed that Mr. Pérez his condition. They are open to a neuro consult on  and consider withdrawal of care on Monday. As he moving spontaneously though not following commands.  500 ml urine again over night.   He is off levophed though BP is low. He is now tolerating cortrak tube feeding at 30 ml/hour.   He has used 6 units of insulin sliding scale.   Consultants/Specialty  Cardiology  Critical care. I discussed with Dr. Gonda on ICU Hot Rounds.   Nephrology. I discussed with Dr. Lyons    Disposition  ICU        Review of Systems   Unable to perform ROS: Intubated      Physical Exam  Laboratory/Imaging   Hemodynamics  Temp (24hrs), Av.3 °C (99.1 °F), Min:36.4 °C (97.5 °F), Max:37.6 °C (99.7 °F)   Temperature: 37.4 °C (99.3 °F)  Pulse  Av.8  Min: 36  Max: 84 Heart Rate (Monitored): 72  Arterial BP: 117/45, NIBP: (!) 112/38 CVP (mm Hg): (!) 8 MM HG    Respiratory  Gorman Vent Mode: Spont, Rate (breaths/min): 14, PEEP/CPAP: 8, PEEP/CPAP: 8, FiO2: 30, P Peak (PIP): 16, P MEAN: 10   Respiration: (!) 31, Pulse Oximetry: 92 %     Work Of Breathing / Effort: Vented  RUL Breath Sounds: Diminished, RML Breath Sounds: Diminished, RLL Breath Sounds: Diminished, KE Breath Sounds: Diminished, LLL Breath Sounds: Diminished    Fluids    Intake/Output Summary (Last 24 hours) at 18 0810  Last data filed at 18 0600   Gross per 24 hour   Intake          1034.03 ml   Output             2825 ml   Net         -1790.97 ml       Nutrition  Orders Placed  This Encounter   Procedures   • Diet NPO     Standing Status:   Standing     Number of Occurrences:   1     Order Specific Question:   Type:     Answer:   Now [1]     Order Specific Question:   Restrict to:     Answer:   Strict [1]     Physical Exam   Constitutional: He appears well-developed and well-nourished. No distress.   Eyes:   slight corneal reflexes.   Neck:   Left IJ dialysis catheter .   Cardiovascular:   Sinus rhythm  No murmur   Pulmonary/Chest:   Vent, good air movement  +crackles.   Abdominal: Soft. He exhibits no distension.   Genitourinary:   Genitourinary Comments: Pedraza catheter   Musculoskeletal: He exhibits edema.   Right femoral arterial line  More edema left hand than right   Neurological:   He does not follow commands nor open eyes to command. + spontaneous movement both feet though no clear withdrawal to pain    Skin: Skin is warm and dry.   Nursing note and vitals reviewed.      Recent Labs      07/04/18   0535  07/05/18   0500  07/06/18   0550   WBC  13.2*  9.8  7.5   RBC  3.52*  2.89*  2.94*   HEMOGLOBIN  9.0*  7.5*  7.5*   HEMATOCRIT  29.1*  23.9*  24.1*   MCV  82.7  82.7  82.0   MCH  25.6*  26.0*  25.5*   MCHC  30.9*  31.4*  31.1*   RDW  51.3*  51.0*  50.0   PLATELETCT  120*  122*  94*   MPV  11.2  11.9  11.0     Recent Labs      07/04/18   0535  07/05/18   0500  07/06/18   0550   SODIUM  134*  137  139   POTASSIUM  4.6  3.6  3.6   CHLORIDE  101  102  103   CO2  18*  22  27   GLUCOSE  214*  201*  247*   BUN  54*  49*  43*   CREATININE  4.00*  3.78*  3.13*   CALCIUM  7.3*  7.3*  7.3*     Recent Labs      07/04/18   0535  07/05/18   0500   INR  4.20*  1.65*                  Assessment/Plan     * Cardiac arrest (HCC)   Assessment & Plan    s/p out of hospital arrest with ROSC  s/p amiodarone drip now transitioned to cortrak   Cardiology consulted.  Severe cardiomyopathy  His condition is consistent with anoxic brain injury.   Neurology consult on 7/7.           Cardiogenic shock (HCC)-  (present on admission)   Assessment & Plan    Required IV pressors.         Acute hypoxemic respiratory failure (HCC)   Assessment & Plan    Requiring intubation  Critical care consulted  He cannot tolerate weaning        Acute renal failure (ARF) (HCC)- (present on admission)   Assessment & Plan    Cr had gone up to 5.66 with poor urine output.  A temporary dialysis catheter was placed and dialysis initiated on 7/3.  He has been making urine.         Anoxic encephalopathy (HCC)- (present on admission)   Assessment & Plan    Severe  EEG negative for seizures  Neurology consult on 7/7        Aspiration pneumonia ([facility])   Assessment & Plan    IV Unasyn completed 5 day course        Ischemic cardiomyopathy   Assessment & Plan    EF 20%  IV diuresis with lasix        Atrial fibrillation ([facility])   Assessment & Plan    Paroxysmal          Type 2 diabetes mellitus (HCC)- (present on admission)   Assessment & Plan    Uncontrolled with hyperglycemia   NPH insulin with sliding scale.         Hyperlipidemia- (present on admission)   Assessment & Plan    Continue atorvastatin        Circulating anticoagulants (HCC)- (present on admission)   Assessment & Plan    On outpatient warfarin.   INR trended down.  Hb has dropped thus refrain from weight-based.        Anemia- (present on admission)   Assessment & Plan    No signs of bleeding hemoglobin stable        CKD (chronic kidney disease) stage 3, GFR 30-59 ml/min- (present on admission)   Assessment & Plan    History of          Quality-Core Measures   Reviewed items::  Radiology images reviewed, Medications reviewed and Labs reviewed  Pedraza catheter::  Unconscious / Sedated Patient on a Ventilator  DVT prophylaxis pharmacological::  Contraindicated - High bleeding risk      Prognosis is poor.

## 2018-07-06 NOTE — DISCHARGE PLANNING
Medical SW    Sw attended AM IDT Rounds.    RN reports, pt receiving dialysis now, same neuro/cog status as yesterday, not withdrawaling, positive reflex corneal/gag/cough, cortrak w/ tube feed at goal, stephen in place, not mobilizing,       Plan: Sw to assist w/ d/c planning as needed.

## 2018-07-06 NOTE — PROGRESS NOTES
Bedside report from MARY Sandoval. Lines and drips verified. Family updated at this time.     12 hour chart check

## 2018-07-07 NOTE — DISCHARGE SUMMARY
Discharge Summary    CHIEF COMPLAINT ON ADMISSION  Chief Complaint   Patient presents with   • Cardiac Arrest   Cause of death: cardiac arrest secondary to atherosclerotic coronary artery disease.    Reason for Admission  Shortness of breath     Admission Date  2018    CODE STATUS  Comfort Care/DNR    HPI & HOSPITAL COURSE  This is a 67 y.o. male here with out of hospital cardiac arrest.  Mr. Pérez has a history of DM, CABG, ischemic cardiomyopathy, and HTN that had a out of hospital cardiac arrest and underwent CPR and was shocked and intubated. He was admitted to the ICU on the hypothermia protocol on a vent. He was initiated on an amiodarone drip. He was treated with a 5 day course of unasyn for aspiration pneumonia. He had paroxysmal atrial fibrillation on telemetry. Due to diabetes, he was placed on NPH and sliding scale insulin with diabetic tube feeds. He had a known EF of 15-20% and an echo revealed the same.   .    His Cr went up to 5.6 and he was initiated on dialysis 7/3 and had undergone daily dialysis.   I met with his wife and sons as well as other family at bedside and we discussed that Mr. Pérez's neurological condition had not improved. He had some spontaneous movement though did not follow nor make purposeful movements. An EEG was done that revealed encephalopathy without seizures. Family elected for comfort care thus comfort care measures and extubation were performed and he  in comfort and with dignity.           Date of death  2018      DISCHARGE DIAGNOSES  Principal Problem:    Cardiac arrest (HCC) POA: Unknown  Active Problems:    Acute hypoxemic respiratory failure (HCC) POA: Unknown    Cardiogenic shock (HCC) POA: Yes    CKD (chronic kidney disease) stage 3, GFR 30-59 ml/min POA: Yes    Anemia POA: Yes    Circulating anticoagulants (HCC) POA: Yes    Hyperlipidemia POA: Yes    Type 2 diabetes mellitus (HCC) POA: Yes    Atrial fibrillation (HCC) POA: Unknown    Ischemic  cardiomyopathy POA: Unknown    Aspiration pneumonia (HCC) POA: Unknown    Anoxic encephalopathy (HCC) POA: Yes    Acute renal failure (ARF) (HCC) POA: Yes  Anoxic brain injury            CONSULTATIONS  Cardiology  Critical care  nephrology    PROCEDURES  Dialysis catheter placement    LABORATORY  Lab Results   Component Value Date    SODIUM 139 07/06/2018    POTASSIUM 3.6 07/06/2018    CHLORIDE 103 07/06/2018    CO2 27 07/06/2018    GLUCOSE 247 (H) 07/06/2018    BUN 43 (H) 07/06/2018    CREATININE 3.13 (H) 07/06/2018        Lab Results   Component Value Date    WBC 7.5 07/06/2018    HEMOGLOBIN 7.5 (L) 07/06/2018    HEMATOCRIT 24.1 (L) 07/06/2018    PLATELETCT 94 (L) 07/06/2018    Echocardiogram:  Compared to the report of the study done on 10/29/2017 there has been   no significant change.  Severely reduced left ventricular systolic function. Left ventricular   ejection fraction is visually estimated to be 15-20%.   No evidence of valvular abnormality based on Doppler evaluation.   Estimated right ventricular systolic pressure is 45 mmHg.    Total time of the discharge process exceeds 32 minutes including discussion with family and initiation of comfort care orders.

## 2018-07-07 NOTE — PROGRESS NOTES
Patient pronounced on 18 at 1849 by 2 RNs, Marisa Bobby and Jaime Purdy. All documentation charted appropriately. Family at bedside and made aware of patient time of death. Questions and concerns answered and family given information regarding  homes and transport back to residence in [place] for  arrangements. Charge RN and NAM made aware of patient expiration. Hospitalist, PMA, and nephrology made aware of patient expiration. made aware of patient death and [place] [facility] [place] and tissue donation made aware of patient passing. Family refused tissue and eye donation. Proper postmortem care and removal of all lines performed by RN. All belongings sent home with patient family.

## 2018-07-07 NOTE — PROGRESS NOTES
Nephrology Progress Note, Adult, Complex               Author: Leena Santosvitorcarlos Date & Time created: 7/6/2018  5:23 PM     Interval History:  66 y/o male with HTN, DM II, CKD -suffered from cardiac arrest -s/p hypothermia protocol  In [place]  Intub/vent  Poor UOP -improved  Started HD  Seen and examined during dialysis treatment -tolerates well  Review of Systems:  Review of Systems   Reason unable to perform ROS: intubated.       Physical Exam:  Physical Exam   Constitutional: He appears well-developed and well-nourished. No distress.   HENT:   Head: Normocephalic and atraumatic.   Nose: Nose normal.   Mouth/Throat: No oropharyngeal exudate.   ETT   Eyes: Conjunctivae are normal. Pupils are equal, round, and reactive to light. No scleral icterus.   Neck: No thyromegaly present.   Cardiovascular: Normal rate and regular rhythm.  Exam reveals no gallop and no friction rub.    Pulmonary/Chest:   Coarse BS  vented   Nursing note and vitals reviewed.      Labs:  Recent Labs      07/04/18   0352  07/05/18   0401  07/06/18   0410   ISTATAPH  7.409  7.484  7.524*   ISTATAPCO2  32.5  33.8  33.3   ISTATAPO2  74  122*  71   ISTATATCO2  22  26  28   [id number]  95  99  96   ISTATARTHCO3  20.5  25.4*  27.5*   ISTATARTBE  -4  2  4*   ISTATTEMP  36.1 C  37.6 C  37.5 C   ISTATFIO2  40  40  30   ISTATSPEC  Arterial  Arterial  Arterial   ISTATAPHTC  7.423  7.474  7.516*   [id number]  69  125*  73         Recent Labs      07/04/18   0535  07/05/18   0500  07/06/18   0550   SODIUM  134*  137  139   POTASSIUM  4.6  3.6  3.6   CHLORIDE  101  102  103   CO2  18*  22  27   BUN  54*  49*  43*   CREATININE  4.00*  3.78*  3.13*   MAGNESIUM  2.8*  2.5  2.2   PHOSPHORUS  7.1*  5.3*  3.7   CALCIUM  7.3*  7.3*  7.3*     Recent Labs      07/04/18   0535  07/05/18   0500  07/06/18   0550   GLUCOSE  214*  201*  247*     Recent Labs      07/04/18   0535  07/05/18   0500  07/06/18   0550  07/06/18   0959   RBC  3.52*  2.89*  2.94*   --    HEMOGLOBIN  9.0*   7.5*  7.5*   --    HEMATOCRIT  29.1*  23.9*  24.1*   --    PLATELETCT  120*  122*  94*   --    PROTHROMBTM  40.3*  19.2*   --   15.5*   INR  4.20*  1.65*   --   1.26*     Recent Labs      18   0535  18   0500  18   0550   WBC  13.2*  9.8  7.5   NEUTSPOLYS  86.60*  83.60*  73.70*   LYMPHOCYTES  2.60*  3.50*  6.10*   MONOCYTES  8.70  9.10  11.20   EOSINOPHILS  0.20  1.60  5.90   BASOPHILS  0.20  0.20  0.30           Hemodynamics:  Temp (24hrs), Av.6 °C (99.6 °F), Min:37.4 °C (99.3 °F), Max:37.6 °C (99.7 °F)  Temperature: 37.4 °C (99.3 °F)  Pulse  Av  Min: 36  Max: 84Heart Rate (Monitored): 75  Arterial BP: 107/42, NIBP: 111/44  CVP (mm Hg): (!) 8 MM HG  Respiratory:  Gorman Vent Mode: APVCMV, Rate (breaths/min): 14, PEEP/CPAP: 8, FiO2: 30, P Peak (PIP): 30, P MEAN: 13 Respiration: 17, Pulse Oximetry: 100 %     Work Of Breathing / Effort: Vented  RUL Breath Sounds: Crackles, RML Breath Sounds: Diminished, RLL Breath Sounds: Diminished, KE Breath Sounds: Crackles, LLL Breath Sounds: Diminished  Fluids:    Intake/Output Summary (Last 24 hours) at 18 1723  Last data filed at 18 0900   Gross per 24 hour   Intake           1014.8 ml   Output             5075 ml   Net          -4060.2 ml        GI/Nutrition:  Orders Placed This Encounter   Procedures   • Diet NPO     Standing Status:   Standing     Number of Occurrences:   1     Order Specific Question:   Type:     Answer:   Now [1]     Order Specific Question:   Restrict to:     Answer:   Strict [1]     Medical Decision Making, by Problem:  Active Hospital Problems    Diagnosis   • *Cardiac arrest (HCC) [I46.9]   • Hypotension [I95.9]   • Acute hypoxemic respiratory failure (HCC) [J96.01]   • Cardiogenic shock (HCC) [R57.0]   • Anemia [D64.9]   • CKD (chronic kidney disease) stage 3, GFR 30-59 ml/min [N18.3]   • Anoxic encephalopathy (HCC) [G93.1]   • Aspiration pneumonia (HCC) [J69.0]   • Atrial fibrillation (HCC) [I48.91]   •  Ischemic cardiomyopathy [I25.5]   • Hyperlipidemia [E78.5]   • Type 2 diabetes mellitus (HCC) [E11.9]   • Circulating anticoagulants (HCC) [D68.318]       Quality-Core Measures   Reviewed items::  Labs reviewed and Medications reviewed  Pedraza catheter::  Critically Ill - Requiring Accurate Measurement of Urinary Output  Central line in place:  Dialysis    Assessment and plan  1.ARABELLA/CKD/ATN/HD -seen and examined during dialysis treatment -please see dialysis flow sheet for details  2.Hypotension: better -off pressor   3.Electrolytes: elevated Phos level - normalized  4.Anemia: low Hb stable  5.Volume:overloaded -UF with HD as tolerates    Recs: daily dialysis             Daily BMP             All meds to renal doses             Prognosis poor

## 2023-07-18 NOTE — PROGRESS NOTES
Charge RN and supervisor notified about inability to get a feeding pump.     Call placed to disable ICD, call returned at 1130 rep on his way    Gonda to place dialysis catheter after having a family meeting   negative